# Patient Record
Sex: MALE | Race: BLACK OR AFRICAN AMERICAN | NOT HISPANIC OR LATINO
[De-identification: names, ages, dates, MRNs, and addresses within clinical notes are randomized per-mention and may not be internally consistent; named-entity substitution may affect disease eponyms.]

---

## 2017-04-05 ENCOUNTER — APPOINTMENT (OUTPATIENT)
Dept: OTHER | Facility: CLINIC | Age: 62
End: 2017-04-05

## 2017-04-05 VITALS
HEART RATE: 67 BPM | WEIGHT: 296 LBS | HEIGHT: 78 IN | DIASTOLIC BLOOD PRESSURE: 140 MMHG | BODY MASS INDEX: 34.25 KG/M2 | SYSTOLIC BLOOD PRESSURE: 229 MMHG

## 2017-04-05 VITALS — SYSTOLIC BLOOD PRESSURE: 220 MMHG | HEART RATE: 80 BPM | DIASTOLIC BLOOD PRESSURE: 150 MMHG

## 2017-04-09 ENCOUNTER — RESULT CHARGE (OUTPATIENT)
Age: 62
End: 2017-04-09

## 2017-04-10 ENCOUNTER — LABORATORY RESULT (OUTPATIENT)
Age: 62
End: 2017-04-10

## 2017-04-10 ENCOUNTER — NON-APPOINTMENT (OUTPATIENT)
Age: 62
End: 2017-04-10

## 2017-04-10 ENCOUNTER — APPOINTMENT (OUTPATIENT)
Dept: INTERNAL MEDICINE | Facility: CLINIC | Age: 62
End: 2017-04-10

## 2017-04-10 ENCOUNTER — OUTPATIENT (OUTPATIENT)
Dept: OUTPATIENT SERVICES | Facility: HOSPITAL | Age: 62
LOS: 1 days | End: 2017-04-10
Payer: SELF-PAY

## 2017-04-10 VITALS — HEIGHT: 78 IN | BODY MASS INDEX: 33.55 KG/M2 | WEIGHT: 290 LBS | HEART RATE: 77 BPM

## 2017-04-10 VITALS — SYSTOLIC BLOOD PRESSURE: 216 MMHG | DIASTOLIC BLOOD PRESSURE: 120 MMHG

## 2017-04-10 VITALS — SYSTOLIC BLOOD PRESSURE: 206 MMHG | DIASTOLIC BLOOD PRESSURE: 120 MMHG

## 2017-04-10 DIAGNOSIS — I10 ESSENTIAL (PRIMARY) HYPERTENSION: ICD-10-CM

## 2017-04-10 DIAGNOSIS — T14.8 OTHER INJURY OF UNSPECIFIED BODY REGION: Chronic | ICD-10-CM

## 2017-04-10 LAB
HCT VFR BLD CALC: 54.1 % — HIGH (ref 39–50)
HGB BLD-MCNC: 17.4 G/DL — HIGH (ref 13–17)
MCHC RBC-ENTMCNC: 26 PG — LOW (ref 27–34)
MCHC RBC-ENTMCNC: 32.2 GM/DL — SIGNIFICANT CHANGE UP (ref 32–36)
MCV RBC AUTO: 81 FL — SIGNIFICANT CHANGE UP (ref 80–100)
PLATELET # BLD AUTO: 205 K/UL — SIGNIFICANT CHANGE UP (ref 150–400)
RBC # BLD: 6.68 M/UL — HIGH (ref 4.2–5.8)
RBC # FLD: 16.2 % — HIGH (ref 10.3–14.5)
WBC # BLD: 6.14 K/UL — SIGNIFICANT CHANGE UP (ref 3.8–10.5)
WBC # FLD AUTO: 6.14 K/UL — SIGNIFICANT CHANGE UP (ref 3.8–10.5)

## 2017-04-10 PROCEDURE — 80061 LIPID PANEL: CPT

## 2017-04-10 PROCEDURE — 86780 TREPONEMA PALLIDUM: CPT

## 2017-04-10 PROCEDURE — 80053 COMPREHEN METABOLIC PANEL: CPT

## 2017-04-10 PROCEDURE — 82088 ASSAY OF ALDOSTERONE: CPT

## 2017-04-10 PROCEDURE — 86735 MUMPS ANTIBODY: CPT

## 2017-04-10 PROCEDURE — 86762 RUBELLA ANTIBODY: CPT

## 2017-04-10 PROCEDURE — 84244 ASSAY OF RENIN: CPT

## 2017-04-10 PROCEDURE — 85027 COMPLETE CBC AUTOMATED: CPT

## 2017-04-10 PROCEDURE — 83036 HEMOGLOBIN GLYCOSYLATED A1C: CPT

## 2017-04-10 PROCEDURE — G0463: CPT

## 2017-04-10 PROCEDURE — 84443 ASSAY THYROID STIM HORMONE: CPT

## 2017-04-10 PROCEDURE — 87389 HIV-1 AG W/HIV-1&-2 AB AG IA: CPT

## 2017-04-10 PROCEDURE — 86765 RUBEOLA ANTIBODY: CPT

## 2017-04-11 LAB
ALBUMIN SERPL ELPH-MCNC: 4.5 G/DL — SIGNIFICANT CHANGE UP (ref 3.3–5)
ALDOST SERPL-MCNC: 27.5 NG/DL — HIGH
ALP SERPL-CCNC: 67 U/L — SIGNIFICANT CHANGE UP (ref 40–120)
ALT FLD-CCNC: 41 U/L — SIGNIFICANT CHANGE UP (ref 10–45)
ANION GAP SERPL CALC-SCNC: 19 MMOL/L — HIGH (ref 5–17)
AST SERPL-CCNC: 37 U/L — SIGNIFICANT CHANGE UP (ref 10–40)
BILIRUB SERPL-MCNC: 1 MG/DL — SIGNIFICANT CHANGE UP (ref 0.2–1.2)
BUN SERPL-MCNC: 20 MG/DL — SIGNIFICANT CHANGE UP (ref 7–23)
CALCIUM SERPL-MCNC: 10.3 MG/DL — SIGNIFICANT CHANGE UP (ref 8.4–10.5)
CHLORIDE SERPL-SCNC: 100 MMOL/L — SIGNIFICANT CHANGE UP (ref 96–108)
CHOLEST SERPL-MCNC: 260 MG/DL — HIGH (ref 10–199)
CO2 SERPL-SCNC: 24 MMOL/L — SIGNIFICANT CHANGE UP (ref 22–31)
CREAT SERPL-MCNC: 1.32 MG/DL — HIGH (ref 0.5–1.3)
GLUCOSE SERPL-MCNC: 98 MG/DL — SIGNIFICANT CHANGE UP (ref 70–99)
HBA1C BLD-MCNC: 6.5 % — HIGH (ref 4–5.6)
HDLC SERPL-MCNC: 79 MG/DL — SIGNIFICANT CHANGE UP (ref 40–125)
HIV 1+2 AB+HIV1 P24 AG SERPL QL IA: SIGNIFICANT CHANGE UP
LIPID PNL WITH DIRECT LDL SERPL: 157 MG/DL — HIGH
MEV IGG SER-ACNC: >300 AU/ML — SIGNIFICANT CHANGE UP
MEV IGG+IGM SER-IMP: POSITIVE — SIGNIFICANT CHANGE UP
MUV AB SER-ACNC: NEGATIVE — SIGNIFICANT CHANGE UP
MUV IGG FLD-ACNC: <5 AU/ML — SIGNIFICANT CHANGE UP
POTASSIUM SERPL-MCNC: 4.7 MMOL/L — SIGNIFICANT CHANGE UP (ref 3.5–5.3)
POTASSIUM SERPL-SCNC: 4.7 MMOL/L — SIGNIFICANT CHANGE UP (ref 3.5–5.3)
PROT SERPL-MCNC: 7.4 G/DL — SIGNIFICANT CHANGE UP (ref 6–8.3)
RENIN DIRECT, PLASMA: <2.1 PG/ML — SIGNIFICANT CHANGE UP
RUBV IGG SER-ACNC: 28.3 INDEX — SIGNIFICANT CHANGE UP
RUBV IGG SER-IMP: POSITIVE — SIGNIFICANT CHANGE UP
SODIUM SERPL-SCNC: 143 MMOL/L — SIGNIFICANT CHANGE UP (ref 135–145)
T PALLIDUM AB TITR SER: NEGATIVE — SIGNIFICANT CHANGE UP
TOTAL CHOLESTEROL/HDL RATIO MEASUREMENT: 3.3 RATIO — LOW (ref 3.4–9.6)
TRIGL SERPL-MCNC: 118 MG/DL — SIGNIFICANT CHANGE UP (ref 10–149)
TSH SERPL-MCNC: 0.63 UIU/ML — SIGNIFICANT CHANGE UP (ref 0.27–4.2)

## 2017-04-12 ENCOUNTER — OUTPATIENT (OUTPATIENT)
Dept: OUTPATIENT SERVICES | Facility: HOSPITAL | Age: 62
LOS: 1 days | End: 2017-04-12
Payer: SELF-PAY

## 2017-04-12 ENCOUNTER — APPOINTMENT (OUTPATIENT)
Dept: INTERNAL MEDICINE | Facility: CLINIC | Age: 62
End: 2017-04-12

## 2017-04-12 VITALS — SYSTOLIC BLOOD PRESSURE: 170 MMHG | DIASTOLIC BLOOD PRESSURE: 110 MMHG

## 2017-04-12 VITALS
BODY MASS INDEX: 33.55 KG/M2 | HEIGHT: 78 IN | WEIGHT: 290 LBS | DIASTOLIC BLOOD PRESSURE: 100 MMHG | SYSTOLIC BLOOD PRESSURE: 160 MMHG

## 2017-04-12 DIAGNOSIS — I10 ESSENTIAL (PRIMARY) HYPERTENSION: ICD-10-CM

## 2017-04-12 DIAGNOSIS — T14.8 OTHER INJURY OF UNSPECIFIED BODY REGION: Chronic | ICD-10-CM

## 2017-04-12 PROCEDURE — G0463: CPT

## 2017-04-18 DIAGNOSIS — I51.7 CARDIOMEGALY: ICD-10-CM

## 2017-04-18 DIAGNOSIS — Z00.00 ENCOUNTER FOR GENERAL ADULT MEDICAL EXAMINATION WITHOUT ABNORMAL FINDINGS: ICD-10-CM

## 2017-04-18 DIAGNOSIS — I48.91 UNSPECIFIED ATRIAL FIBRILLATION: ICD-10-CM

## 2017-04-18 DIAGNOSIS — R94.31 ABNORMAL ELECTROCARDIOGRAM [ECG] [EKG]: ICD-10-CM

## 2017-04-19 DIAGNOSIS — E78.00 PURE HYPERCHOLESTEROLEMIA, UNSPECIFIED: ICD-10-CM

## 2017-04-19 DIAGNOSIS — E11.9 TYPE 2 DIABETES MELLITUS WITHOUT COMPLICATIONS: ICD-10-CM

## 2017-04-20 ENCOUNTER — OUTPATIENT (OUTPATIENT)
Dept: OUTPATIENT SERVICES | Facility: HOSPITAL | Age: 62
LOS: 1 days | End: 2017-04-20
Payer: SELF-PAY

## 2017-04-20 ENCOUNTER — APPOINTMENT (OUTPATIENT)
Dept: INTERNAL MEDICINE | Facility: CLINIC | Age: 62
End: 2017-04-20

## 2017-04-20 VITALS
HEIGHT: 78 IN | DIASTOLIC BLOOD PRESSURE: 90 MMHG | SYSTOLIC BLOOD PRESSURE: 120 MMHG | BODY MASS INDEX: 33.55 KG/M2 | WEIGHT: 290 LBS

## 2017-04-20 VITALS — HEART RATE: 70 BPM

## 2017-04-20 DIAGNOSIS — I10 ESSENTIAL (PRIMARY) HYPERTENSION: ICD-10-CM

## 2017-04-20 DIAGNOSIS — T14.8 OTHER INJURY OF UNSPECIFIED BODY REGION: Chronic | ICD-10-CM

## 2017-04-20 PROCEDURE — G0463: CPT

## 2017-04-24 DIAGNOSIS — I51.7 CARDIOMEGALY: ICD-10-CM

## 2017-04-24 DIAGNOSIS — E78.00 PURE HYPERCHOLESTEROLEMIA, UNSPECIFIED: ICD-10-CM

## 2017-04-24 DIAGNOSIS — R94.31 ABNORMAL ELECTROCARDIOGRAM [ECG] [EKG]: ICD-10-CM

## 2017-04-24 DIAGNOSIS — E11.9 TYPE 2 DIABETES MELLITUS WITHOUT COMPLICATIONS: ICD-10-CM

## 2017-04-26 DIAGNOSIS — Z00.00 ENCOUNTER FOR GENERAL ADULT MEDICAL EXAMINATION WITHOUT ABNORMAL FINDINGS: ICD-10-CM

## 2017-04-26 DIAGNOSIS — I48.91 UNSPECIFIED ATRIAL FIBRILLATION: ICD-10-CM

## 2017-04-28 ENCOUNTER — CLINICAL ADVICE (OUTPATIENT)
Age: 62
End: 2017-04-28

## 2017-04-28 ENCOUNTER — LABORATORY RESULT (OUTPATIENT)
Age: 62
End: 2017-04-28

## 2017-04-28 ENCOUNTER — APPOINTMENT (OUTPATIENT)
Dept: INTERNAL MEDICINE | Facility: CLINIC | Age: 62
End: 2017-04-28

## 2017-04-28 ENCOUNTER — OUTPATIENT (OUTPATIENT)
Dept: OUTPATIENT SERVICES | Facility: HOSPITAL | Age: 62
LOS: 1 days | End: 2017-04-28
Payer: SELF-PAY

## 2017-04-28 VITALS
WEIGHT: 294 LBS | DIASTOLIC BLOOD PRESSURE: 80 MMHG | BODY MASS INDEX: 34.02 KG/M2 | HEIGHT: 78 IN | SYSTOLIC BLOOD PRESSURE: 180 MMHG | HEART RATE: 86 BPM

## 2017-04-28 DIAGNOSIS — I10 ESSENTIAL (PRIMARY) HYPERTENSION: ICD-10-CM

## 2017-04-28 DIAGNOSIS — T14.8 OTHER INJURY OF UNSPECIFIED BODY REGION: Chronic | ICD-10-CM

## 2017-04-28 PROCEDURE — 82085 ASSAY OF ALDOLASE: CPT

## 2017-04-28 PROCEDURE — 80053 COMPREHEN METABOLIC PANEL: CPT

## 2017-04-28 PROCEDURE — 84244 ASSAY OF RENIN: CPT

## 2017-04-28 PROCEDURE — 83835 ASSAY OF METANEPHRINES: CPT

## 2017-04-28 PROCEDURE — G0463: CPT

## 2017-04-28 RX ORDER — LABETALOL HYDROCHLORIDE 200 MG/1
200 TABLET, FILM COATED ORAL 3 TIMES DAILY
Qty: 1 | Refills: 1 | Status: DISCONTINUED | COMMUNITY
Start: 2017-04-10 | End: 2017-04-28

## 2017-04-28 RX ORDER — HYDROCHLOROTHIAZIDE 25 MG/1
25 TABLET ORAL
Qty: 90 | Refills: 2 | Status: DISCONTINUED | COMMUNITY
Start: 2017-04-10 | End: 2017-04-28

## 2017-04-28 RX ORDER — ATORVASTATIN CALCIUM 80 MG/1
80 TABLET, FILM COATED ORAL
Qty: 30 | Refills: 0 | Status: DISCONTINUED | COMMUNITY
Start: 2017-04-11 | End: 2017-04-28

## 2017-04-29 LAB
ALBUMIN SERPL ELPH-MCNC: 4.5 G/DL — SIGNIFICANT CHANGE UP (ref 3.3–5)
ALP SERPL-CCNC: 63 U/L — SIGNIFICANT CHANGE UP (ref 40–120)
ALT FLD-CCNC: 38 U/L — SIGNIFICANT CHANGE UP (ref 10–45)
ANION GAP SERPL CALC-SCNC: 16 MMOL/L — SIGNIFICANT CHANGE UP (ref 5–17)
AST SERPL-CCNC: 36 U/L — SIGNIFICANT CHANGE UP (ref 10–40)
BILIRUB SERPL-MCNC: 1.2 MG/DL — SIGNIFICANT CHANGE UP (ref 0.2–1.2)
BUN SERPL-MCNC: 16 MG/DL — SIGNIFICANT CHANGE UP (ref 7–23)
CALCIUM SERPL-MCNC: 9.8 MG/DL — SIGNIFICANT CHANGE UP (ref 8.4–10.5)
CHLORIDE SERPL-SCNC: 99 MMOL/L — SIGNIFICANT CHANGE UP (ref 96–108)
CO2 SERPL-SCNC: 26 MMOL/L — SIGNIFICANT CHANGE UP (ref 22–31)
CREAT SERPL-MCNC: 1.36 MG/DL — HIGH (ref 0.5–1.3)
GLUCOSE SERPL-MCNC: 93 MG/DL — SIGNIFICANT CHANGE UP (ref 70–99)
POTASSIUM SERPL-MCNC: 4.4 MMOL/L — SIGNIFICANT CHANGE UP (ref 3.5–5.3)
POTASSIUM SERPL-SCNC: 4.4 MMOL/L — SIGNIFICANT CHANGE UP (ref 3.5–5.3)
PROT SERPL-MCNC: 7.6 G/DL — SIGNIFICANT CHANGE UP (ref 6–8.3)
SODIUM SERPL-SCNC: 141 MMOL/L — SIGNIFICANT CHANGE UP (ref 135–145)

## 2017-05-02 ENCOUNTER — FORM ENCOUNTER (OUTPATIENT)
Age: 62
End: 2017-05-02

## 2017-05-03 ENCOUNTER — OUTPATIENT (OUTPATIENT)
Dept: OUTPATIENT SERVICES | Facility: HOSPITAL | Age: 62
LOS: 1 days | End: 2017-05-03
Payer: COMMERCIAL

## 2017-05-03 ENCOUNTER — APPOINTMENT (OUTPATIENT)
Dept: ULTRASOUND IMAGING | Facility: CLINIC | Age: 62
End: 2017-05-03

## 2017-05-03 DIAGNOSIS — Z00.8 ENCOUNTER FOR OTHER GENERAL EXAMINATION: ICD-10-CM

## 2017-05-03 DIAGNOSIS — T14.8 OTHER INJURY OF UNSPECIFIED BODY REGION: Chronic | ICD-10-CM

## 2017-05-03 PROCEDURE — 93975 VASCULAR STUDY: CPT

## 2017-05-04 LAB
METANEPHRINE, PL: 40 PG/ML — SIGNIFICANT CHANGE UP (ref 0–62)
NORMETANEPHRINE, PL: 327 PG/ML — HIGH (ref 0–145)

## 2017-05-05 ENCOUNTER — OUTPATIENT (OUTPATIENT)
Dept: OUTPATIENT SERVICES | Facility: HOSPITAL | Age: 62
LOS: 1 days | End: 2017-05-05
Payer: SELF-PAY

## 2017-05-05 ENCOUNTER — APPOINTMENT (OUTPATIENT)
Dept: INTERNAL MEDICINE | Facility: CLINIC | Age: 62
End: 2017-05-05

## 2017-05-05 VITALS — SYSTOLIC BLOOD PRESSURE: 138 MMHG | DIASTOLIC BLOOD PRESSURE: 88 MMHG

## 2017-05-05 VITALS
WEIGHT: 292 LBS | HEART RATE: 75 BPM | HEIGHT: 78 IN | DIASTOLIC BLOOD PRESSURE: 90 MMHG | BODY MASS INDEX: 33.78 KG/M2 | SYSTOLIC BLOOD PRESSURE: 150 MMHG

## 2017-05-05 DIAGNOSIS — I10 ESSENTIAL (PRIMARY) HYPERTENSION: ICD-10-CM

## 2017-05-05 DIAGNOSIS — T14.8 OTHER INJURY OF UNSPECIFIED BODY REGION: Chronic | ICD-10-CM

## 2017-05-05 PROCEDURE — G0463: CPT

## 2017-05-08 ENCOUNTER — APPOINTMENT (OUTPATIENT)
Dept: INTERNAL MEDICINE | Facility: CLINIC | Age: 62
End: 2017-05-08

## 2017-05-09 DIAGNOSIS — Z13.89 ENCOUNTER FOR SCREENING FOR OTHER DISORDER: ICD-10-CM

## 2017-05-10 ENCOUNTER — APPOINTMENT (OUTPATIENT)
Dept: INTERNAL MEDICINE | Facility: CLINIC | Age: 62
End: 2017-05-10

## 2017-05-12 ENCOUNTER — OUTPATIENT (OUTPATIENT)
Dept: OUTPATIENT SERVICES | Facility: HOSPITAL | Age: 62
LOS: 1 days | Discharge: HOME | End: 2017-05-12

## 2017-05-12 VITALS
RESPIRATION RATE: 16 BRPM | WEIGHT: 286.82 LBS | DIASTOLIC BLOOD PRESSURE: 86 MMHG | SYSTOLIC BLOOD PRESSURE: 152 MMHG | HEIGHT: 78 IN | HEART RATE: 56 BPM | TEMPERATURE: 98 F | OXYGEN SATURATION: 98 %

## 2017-05-12 DIAGNOSIS — M23.92 UNSPECIFIED INTERNAL DERANGEMENT OF LEFT KNEE: ICD-10-CM

## 2017-05-12 DIAGNOSIS — Z98.890 OTHER SPECIFIED POSTPROCEDURAL STATES: Chronic | ICD-10-CM

## 2017-05-12 DIAGNOSIS — I10 ESSENTIAL (PRIMARY) HYPERTENSION: ICD-10-CM

## 2017-05-12 DIAGNOSIS — T14.8 OTHER INJURY OF UNSPECIFIED BODY REGION: Chronic | ICD-10-CM

## 2017-05-12 DIAGNOSIS — Z01.818 ENCOUNTER FOR OTHER PREPROCEDURAL EXAMINATION: ICD-10-CM

## 2017-05-12 DIAGNOSIS — I48.91 UNSPECIFIED ATRIAL FIBRILLATION: ICD-10-CM

## 2017-05-12 DIAGNOSIS — M25.562 PAIN IN LEFT KNEE: ICD-10-CM

## 2017-05-12 LAB
ANION GAP SERPL CALC-SCNC: 9 MMOL/L — SIGNIFICANT CHANGE UP (ref 5–17)
APTT BLD: 38.9 SEC — HIGH (ref 27.5–37.4)
BASOPHILS # BLD AUTO: 0.1 K/UL — SIGNIFICANT CHANGE UP (ref 0–0.2)
BASOPHILS NFR BLD AUTO: 2.1 % — HIGH (ref 0–2)
BUN SERPL-MCNC: 26 MG/DL — HIGH (ref 7–23)
CALCIUM SERPL-MCNC: 9.3 MG/DL — SIGNIFICANT CHANGE UP (ref 8.5–10.1)
CHLORIDE SERPL-SCNC: 103 MMOL/L — SIGNIFICANT CHANGE UP (ref 96–108)
CO2 SERPL-SCNC: 26 MMOL/L — SIGNIFICANT CHANGE UP (ref 22–31)
CREAT SERPL-MCNC: 1.48 MG/DL — HIGH (ref 0.5–1.3)
EOSINOPHIL # BLD AUTO: 0 K/UL — SIGNIFICANT CHANGE UP (ref 0–0.5)
EOSINOPHIL NFR BLD AUTO: 0.7 % — SIGNIFICANT CHANGE UP (ref 0–6)
GLUCOSE SERPL-MCNC: 121 MG/DL — HIGH (ref 70–99)
HCT VFR BLD CALC: 53.3 % — HIGH (ref 39–50)
HGB BLD-MCNC: 18.2 G/DL — HIGH (ref 13–17)
INR BLD: 1 RATIO — SIGNIFICANT CHANGE UP (ref 0.88–1.16)
LYMPHOCYTES # BLD AUTO: 1.8 K/UL — SIGNIFICANT CHANGE UP (ref 1–3.3)
LYMPHOCYTES # BLD AUTO: 26.7 % — SIGNIFICANT CHANGE UP (ref 13–44)
MCHC RBC-ENTMCNC: 26.8 PG — LOW (ref 27–34)
MCHC RBC-ENTMCNC: 34.1 GM/DL — SIGNIFICANT CHANGE UP (ref 32–36)
MCV RBC AUTO: 78.6 FL — LOW (ref 80–100)
MONOCYTES # BLD AUTO: 0.5 K/UL — SIGNIFICANT CHANGE UP (ref 0–0.9)
MONOCYTES NFR BLD AUTO: 7.6 % — SIGNIFICANT CHANGE UP (ref 2–14)
NEUTROPHILS # BLD AUTO: 4.3 K/UL — SIGNIFICANT CHANGE UP (ref 1.8–7.4)
NEUTROPHILS NFR BLD AUTO: 62.9 % — SIGNIFICANT CHANGE UP (ref 43–77)
PLATELET # BLD AUTO: 205 K/UL — SIGNIFICANT CHANGE UP (ref 150–400)
POTASSIUM SERPL-MCNC: 3.2 MMOL/L — LOW (ref 3.5–5.3)
POTASSIUM SERPL-SCNC: 3.2 MMOL/L — LOW (ref 3.5–5.3)
PROTHROM AB SERPL-ACNC: 10.9 SEC — SIGNIFICANT CHANGE UP (ref 9.8–12.7)
RBC # BLD: 6.77 M/UL — HIGH (ref 4.2–5.8)
RBC # FLD: 13.4 % — SIGNIFICANT CHANGE UP (ref 11–15)
SODIUM SERPL-SCNC: 138 MMOL/L — SIGNIFICANT CHANGE UP (ref 135–145)
WBC # BLD: 6.8 K/UL — SIGNIFICANT CHANGE UP (ref 3.8–10.5)
WBC # FLD AUTO: 6.8 K/UL — SIGNIFICANT CHANGE UP (ref 3.8–10.5)

## 2017-05-12 NOTE — H&P PST ADULT - NSANTHOSAYNRD_GEN_A_CORE
No. ZEYAD screening performed.  STOP BANG Legend: 0-2 = LOW Risk; 3-4 = INTERMEDIATE Risk; 5-8 = HIGH Risk

## 2017-05-16 DIAGNOSIS — Z86.79 PERSONAL HISTORY OF OTHER DISEASES OF THE CIRCULATORY SYSTEM: ICD-10-CM

## 2017-05-25 ENCOUNTER — RESULT REVIEW (OUTPATIENT)
Age: 62
End: 2017-05-25

## 2017-05-25 ENCOUNTER — OUTPATIENT (OUTPATIENT)
Dept: OUTPATIENT SERVICES | Facility: HOSPITAL | Age: 62
LOS: 1 days | Discharge: HOME | End: 2017-05-25
Payer: OTHER MISCELLANEOUS

## 2017-05-25 VITALS
SYSTOLIC BLOOD PRESSURE: 144 MMHG | RESPIRATION RATE: 18 BRPM | DIASTOLIC BLOOD PRESSURE: 88 MMHG | OXYGEN SATURATION: 100 % | HEART RATE: 79 BPM | TEMPERATURE: 98 F | WEIGHT: 286.6 LBS | HEIGHT: 78 IN

## 2017-05-25 VITALS
RESPIRATION RATE: 17 BRPM | OXYGEN SATURATION: 97 % | HEART RATE: 84 BPM | SYSTOLIC BLOOD PRESSURE: 148 MMHG | DIASTOLIC BLOOD PRESSURE: 88 MMHG

## 2017-05-25 DIAGNOSIS — Z98.890 OTHER SPECIFIED POSTPROCEDURAL STATES: Chronic | ICD-10-CM

## 2017-05-25 DIAGNOSIS — T14.8 OTHER INJURY OF UNSPECIFIED BODY REGION: Chronic | ICD-10-CM

## 2017-05-25 PROCEDURE — 88304 TISSUE EXAM BY PATHOLOGIST: CPT | Mod: 26

## 2017-05-25 RX ORDER — CHLORTHALIDONE 50 MG
1 TABLET ORAL
Qty: 0 | Refills: 0 | COMMUNITY

## 2017-05-25 RX ORDER — SODIUM CHLORIDE 9 MG/ML
3 INJECTION INTRAMUSCULAR; INTRAVENOUS; SUBCUTANEOUS EVERY 8 HOURS
Qty: 0 | Refills: 0 | Status: DISCONTINUED | OUTPATIENT
Start: 2017-05-25 | End: 2017-05-25

## 2017-05-25 RX ORDER — FENTANYL CITRATE 50 UG/ML
25 INJECTION INTRAVENOUS
Qty: 0 | Refills: 0 | Status: DISCONTINUED | OUTPATIENT
Start: 2017-05-25 | End: 2017-05-25

## 2017-05-25 RX ORDER — ACETAMINOPHEN 500 MG
1000 TABLET ORAL ONCE
Qty: 0 | Refills: 0 | Status: COMPLETED | OUTPATIENT
Start: 2017-05-25 | End: 2017-05-25

## 2017-05-25 RX ORDER — ATORVASTATIN CALCIUM 80 MG/1
1 TABLET, FILM COATED ORAL
Qty: 0 | Refills: 0 | COMMUNITY

## 2017-05-25 RX ORDER — SODIUM CHLORIDE 9 MG/ML
1000 INJECTION, SOLUTION INTRAVENOUS
Qty: 0 | Refills: 0 | Status: DISCONTINUED | OUTPATIENT
Start: 2017-05-25 | End: 2017-05-25

## 2017-05-25 RX ADMIN — Medication 400 MILLIGRAM(S): at 10:12

## 2017-05-25 RX ADMIN — Medication 1000 MILLIGRAM(S): at 10:38

## 2017-05-25 RX ADMIN — SODIUM CHLORIDE 100 MILLILITER(S): 9 INJECTION, SOLUTION INTRAVENOUS at 10:58

## 2017-05-25 NOTE — BRIEF OPERATIVE NOTE - PROCEDURE
Synovectomy  05/25/2017    Active  KIMBER  Debridement of meniscus of knee  05/25/2017    Active  KIMBER

## 2017-05-25 NOTE — ASU DISCHARGE PLAN (ADULT/PEDIATRIC). - SPECIAL INSTRUCTIONS
Please keep surgical wound clean and dry. REPORT ANY CALF PAIN TO MD IMMEDIATELY AND GO TO ER. No strenuous activity, heavy lifting, or bending heavy lifting. Weight bearing as tolerated with cane/crutches. Diet as tolerated.  Use pain medications as needed. Take aspirin daily to prevent blood clots to lower extremities. Your doctors office has sent your medication to your home. Please take them as directed. Contact MD and Return to ER if questions concerns or emergencies.

## 2017-05-25 NOTE — ASU DISCHARGE PLAN (ADULT/PEDIATRIC). - NOTIFY
Numbness, color, or temperature change to extremity/Pain not relieved by Medications/Swelling that continues/Unable to Urinate/Bleeding that does not stop/Fever greater than 101/Persistent Nausea and Vomiting

## 2017-05-25 NOTE — BRIEF OPERATIVE NOTE - POST-OP DX
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

## 2017-05-25 NOTE — ASU DISCHARGE PLAN (ADULT/PEDIATRIC). - MEDICATION SUMMARY - MEDICATIONS TO TAKE
I will START or STAY ON the medications listed below when I get home from the hospital:    Percocet 5/325 oral tablet  -- 1 tab(s) by mouth every 6 hours, As Needed  -- Indication: For pain    aspirin 325 mg oral tablet  -- 1 tab(s) by mouth once a day  -- Indication: For DVt prophylaxis    losartan 100 mg oral tablet  -- 1 tab(s) by mouth once a day  -- Indication: For HTN    gabapentin 300 mg oral capsule  -- 1 cap(s) by mouth once a day (at bedtime)  -- Indication: For Neuropathy    NIFEdipine 90 mg oral tablet, extended release  -- 1 tab(s) by mouth once a day  -- Indication: For HTN

## 2017-05-25 NOTE — ASU PATIENT PROFILE, ADULT - PMH
Afib    Hypertension    ZEYAD (obstructive sleep apnea) Afib    Hyperlipidemia    Hypertension    ZEYAD (obstructive sleep apnea)

## 2017-05-26 LAB — SURGICAL PATHOLOGY FINAL REPORT - CH: SIGNIFICANT CHANGE UP

## 2017-05-30 DIAGNOSIS — G47.33 OBSTRUCTIVE SLEEP APNEA (ADULT) (PEDIATRIC): ICD-10-CM

## 2017-05-30 DIAGNOSIS — M23.204 DERANGEMENT OF UNSPECIFIED MEDIAL MENISCUS DUE TO OLD TEAR OR INJURY, LEFT KNEE: ICD-10-CM

## 2017-05-30 DIAGNOSIS — Z79.891 LONG TERM (CURRENT) USE OF OPIATE ANALGESIC: ICD-10-CM

## 2017-05-30 DIAGNOSIS — M67.52 PLICA SYNDROME, LEFT KNEE: ICD-10-CM

## 2017-05-30 DIAGNOSIS — Z79.82 LONG TERM (CURRENT) USE OF ASPIRIN: ICD-10-CM

## 2017-05-30 DIAGNOSIS — I48.91 UNSPECIFIED ATRIAL FIBRILLATION: ICD-10-CM

## 2017-05-30 DIAGNOSIS — M23.201 DERANGEMENT OF UNSPECIFIED LATERAL MENISCUS DUE TO OLD TEAR OR INJURY, LEFT KNEE: ICD-10-CM

## 2017-05-30 DIAGNOSIS — M25.562 PAIN IN LEFT KNEE: ICD-10-CM

## 2017-05-30 DIAGNOSIS — M94.262 CHONDROMALACIA, LEFT KNEE: ICD-10-CM

## 2017-05-30 DIAGNOSIS — M65.9 SYNOVITIS AND TENOSYNOVITIS, UNSPECIFIED: ICD-10-CM

## 2017-05-30 DIAGNOSIS — I10 ESSENTIAL (PRIMARY) HYPERTENSION: ICD-10-CM

## 2017-05-30 DIAGNOSIS — E78.5 HYPERLIPIDEMIA, UNSPECIFIED: ICD-10-CM

## 2017-05-30 DIAGNOSIS — Z87.891 PERSONAL HISTORY OF NICOTINE DEPENDENCE: ICD-10-CM

## 2017-05-30 DIAGNOSIS — M23.42 LOOSE BODY IN KNEE, LEFT KNEE: ICD-10-CM

## 2017-10-26 ENCOUNTER — RESULT CHARGE (OUTPATIENT)
Age: 62
End: 2017-10-26

## 2017-10-27 ENCOUNTER — APPOINTMENT (OUTPATIENT)
Dept: OTHER | Facility: CLINIC | Age: 62
End: 2017-10-27
Payer: COMMERCIAL

## 2017-10-27 ENCOUNTER — APPOINTMENT (OUTPATIENT)
Dept: OTHER | Facility: CLINIC | Age: 62
End: 2017-10-27

## 2017-10-27 VITALS
BODY MASS INDEX: 33.78 KG/M2 | HEART RATE: 81 BPM | WEIGHT: 292 LBS | SYSTOLIC BLOOD PRESSURE: 190 MMHG | HEIGHT: 78 IN | RESPIRATION RATE: 15 BRPM | DIASTOLIC BLOOD PRESSURE: 120 MMHG | OXYGEN SATURATION: 99 %

## 2017-10-27 DIAGNOSIS — E66.9 OBESITY, UNSPECIFIED: ICD-10-CM

## 2017-10-27 DIAGNOSIS — Z99.89 OBSTRUCTIVE SLEEP APNEA (ADULT) (PEDIATRIC): ICD-10-CM

## 2017-10-27 DIAGNOSIS — Z86.39 PERSONAL HISTORY OF OTHER ENDOCRINE, NUTRITIONAL AND METABOLIC DISEASE: ICD-10-CM

## 2017-10-27 DIAGNOSIS — G47.33 OBSTRUCTIVE SLEEP APNEA (ADULT) (PEDIATRIC): ICD-10-CM

## 2017-10-27 DIAGNOSIS — Z86.79 PERSONAL HISTORY OF OTHER DISEASES OF THE CIRCULATORY SYSTEM: ICD-10-CM

## 2017-10-27 PROCEDURE — 94010 BREATHING CAPACITY TEST: CPT

## 2017-10-27 PROCEDURE — 96150: CPT

## 2017-10-27 PROCEDURE — 99396 PREV VISIT EST AGE 40-64: CPT

## 2017-10-27 RX ORDER — AZITHROMYCIN 250 MG/1
250 TABLET, FILM COATED ORAL
Qty: 6 | Refills: 0 | Status: DISCONTINUED | COMMUNITY
Start: 2017-10-16

## 2017-10-27 RX ORDER — NIFEDIPINE 90 MG/1
90 TABLET, EXTENDED RELEASE ORAL DAILY
Qty: 30 | Refills: 2 | Status: COMPLETED | COMMUNITY
Start: 2017-04-10 | End: 2017-10-27

## 2017-10-27 RX ORDER — GABAPENTIN 300 MG/1
300 CAPSULE ORAL AT BEDTIME
Qty: 30 | Refills: 3 | Status: COMPLETED | COMMUNITY
Start: 2017-04-28 | End: 2017-10-27

## 2017-10-30 LAB
ALBUMIN SERPL ELPH-MCNC: 4.2 G/DL
ALP BLD-CCNC: 65 U/L
ALT SERPL-CCNC: 26 U/L
ANION GAP SERPL CALC-SCNC: 15 MMOL/L
APPEARANCE: CLEAR
AST SERPL-CCNC: 22 U/L
BASOPHILS # BLD AUTO: 0.06 K/UL
BASOPHILS NFR BLD AUTO: 1 %
BILIRUB SERPL-MCNC: 0.8 MG/DL
BILIRUBIN URINE: NEGATIVE
BLOOD URINE: NEGATIVE
BUN SERPL-MCNC: 16 MG/DL
CALCIUM SERPL-MCNC: 9.8 MG/DL
CHLORIDE SERPL-SCNC: 100 MMOL/L
CHOLEST SERPL-MCNC: 206 MG/DL
CHOLEST/HDLC SERPL: 2.6 RATIO
CO2 SERPL-SCNC: 28 MMOL/L
COLOR: YELLOW
CREAT SERPL-MCNC: 1.22 MG/DL
EOSINOPHIL # BLD AUTO: 0.02 K/UL
EOSINOPHIL NFR BLD AUTO: 0.3 %
GLUCOSE QUALITATIVE U: NEGATIVE MG/DL
GLUCOSE SERPL-MCNC: 110 MG/DL
HCT VFR BLD CALC: 48.9 %
HDLC SERPL-MCNC: 78 MG/DL
HGB BLD-MCNC: 16.4 G/DL
IMM GRANULOCYTES NFR BLD AUTO: 0.2 %
KETONES URINE: NEGATIVE
LDLC SERPL CALC-MCNC: 107 MG/DL
LEUKOCYTE ESTERASE URINE: NEGATIVE
LYMPHOCYTES # BLD AUTO: 1.59 K/UL
LYMPHOCYTES NFR BLD AUTO: 26 %
MAN DIFF?: NORMAL
MCHC RBC-ENTMCNC: 27.4 PG
MCHC RBC-ENTMCNC: 33.5 GM/DL
MCV RBC AUTO: 81.6 FL
MONOCYTES # BLD AUTO: 0.63 K/UL
MONOCYTES NFR BLD AUTO: 10.3 %
NEUTROPHILS # BLD AUTO: 3.8 K/UL
NEUTROPHILS NFR BLD AUTO: 62.2 %
NITRITE URINE: NEGATIVE
PH URINE: 7
PLATELET # BLD AUTO: 223 K/UL
POTASSIUM SERPL-SCNC: 3.9 MMOL/L
PROT SERPL-MCNC: 7.2 G/DL
PROTEIN URINE: 100 MG/DL
RBC # BLD: 5.99 M/UL
RBC # FLD: 14.8 %
SODIUM SERPL-SCNC: 143 MMOL/L
SPECIFIC GRAVITY URINE: 1.01
TRIGL SERPL-MCNC: 104 MG/DL
UROBILINOGEN URINE: NEGATIVE MG/DL
WBC # FLD AUTO: 6.11 K/UL

## 2018-02-06 ENCOUNTER — NON-APPOINTMENT (OUTPATIENT)
Age: 63
End: 2018-02-06

## 2018-02-06 ENCOUNTER — APPOINTMENT (OUTPATIENT)
Dept: INTERNAL MEDICINE | Facility: CLINIC | Age: 63
End: 2018-02-06
Payer: COMMERCIAL

## 2018-02-06 VITALS
OXYGEN SATURATION: 95 % | SYSTOLIC BLOOD PRESSURE: 234 MMHG | BODY MASS INDEX: 34.13 KG/M2 | HEIGHT: 78 IN | WEIGHT: 295 LBS | DIASTOLIC BLOOD PRESSURE: 140 MMHG | HEART RATE: 83 BPM

## 2018-02-06 DIAGNOSIS — N50.9 DISORDER OF MALE GENITAL ORGANS, UNSPECIFIED: ICD-10-CM

## 2018-02-06 PROCEDURE — 99213 OFFICE O/P EST LOW 20 MIN: CPT

## 2018-02-06 RX ORDER — AMLODIPINE BESYLATE 10 MG/1
10 TABLET ORAL
Refills: 0 | Status: DISCONTINUED | COMMUNITY
End: 2018-02-06

## 2018-02-06 RX ORDER — CLONIDINE HYDROCHLORIDE 0.3 MG/1
TABLET ORAL
Refills: 0 | Status: DISCONTINUED | COMMUNITY
End: 2018-02-06

## 2018-02-06 RX ORDER — NEBIVOLOL HYDROCHLORIDE 20 MG/1
TABLET ORAL
Refills: 0 | Status: DISCONTINUED | COMMUNITY
End: 2018-02-06

## 2018-02-09 ENCOUNTER — APPOINTMENT (OUTPATIENT)
Dept: INTERNAL MEDICINE | Facility: CLINIC | Age: 63
End: 2018-02-09
Payer: COMMERCIAL

## 2018-02-09 VITALS — SYSTOLIC BLOOD PRESSURE: 140 MMHG | DIASTOLIC BLOOD PRESSURE: 90 MMHG

## 2018-02-09 LAB
25(OH)D3 SERPL-MCNC: 20.8 NG/ML
ALBUMIN SERPL ELPH-MCNC: 4 G/DL
ALP BLD-CCNC: 55 U/L
ALT SERPL-CCNC: 29 U/L
ANION GAP SERPL CALC-SCNC: 15 MMOL/L
APPEARANCE: CLEAR
AST SERPL-CCNC: 29 U/L
BACTERIA: NEGATIVE
BASOPHILS # BLD AUTO: 0.06 K/UL
BASOPHILS NFR BLD AUTO: 0.8 %
BILIRUB SERPL-MCNC: 0.9 MG/DL
BILIRUBIN URINE: NEGATIVE
BLOOD URINE: NEGATIVE
BUN SERPL-MCNC: 13 MG/DL
CALCIUM SERPL-MCNC: 9.7 MG/DL
CHLORIDE SERPL-SCNC: 103 MMOL/L
CHOLEST SERPL-MCNC: 224 MG/DL
CHOLEST/HDLC SERPL: 2.7 RATIO
CO2 SERPL-SCNC: 27 MMOL/L
COLOR: YELLOW
CREAT SERPL-MCNC: 1.34 MG/DL
EOSINOPHIL # BLD AUTO: 0.06 K/UL
EOSINOPHIL NFR BLD AUTO: 0.8 %
GLUCOSE QUALITATIVE U: NEGATIVE MG/DL
GLUCOSE SERPL-MCNC: 73 MG/DL
HBA1C MFR BLD HPLC: 6.3 %
HCT VFR BLD CALC: 51.5 %
HDLC SERPL-MCNC: 84 MG/DL
HGB BLD-MCNC: 16.9 G/DL
HYALINE CASTS: 0 /LPF
IMM GRANULOCYTES NFR BLD AUTO: 0.3 %
KETONES URINE: NEGATIVE
LDLC SERPL CALC-MCNC: 119 MG/DL
LEUKOCYTE ESTERASE URINE: NEGATIVE
LYMPHOCYTES # BLD AUTO: 2 K/UL
LYMPHOCYTES NFR BLD AUTO: 26.4 %
MAN DIFF?: NORMAL
MCHC RBC-ENTMCNC: 26.4 PG
MCHC RBC-ENTMCNC: 32.8 GM/DL
MCV RBC AUTO: 80.6 FL
MICROSCOPIC-UA: NORMAL
MONOCYTES # BLD AUTO: 0.8 K/UL
MONOCYTES NFR BLD AUTO: 10.6 %
NEUTROPHILS # BLD AUTO: 4.63 K/UL
NEUTROPHILS NFR BLD AUTO: 61.1 %
NITRITE URINE: NEGATIVE
PH URINE: 6
PLATELET # BLD AUTO: 199 K/UL
POTASSIUM SERPL-SCNC: 4 MMOL/L
PROT SERPL-MCNC: 7.5 G/DL
PROTEIN URINE: 300 MG/DL
RBC # BLD: 6.39 M/UL
RBC # FLD: 16.7 %
RED BLOOD CELLS URINE: 3 /HPF
SODIUM SERPL-SCNC: 145 MMOL/L
SPECIFIC GRAVITY URINE: 1.02
SQUAMOUS EPITHELIAL CELLS: 1 /HPF
T4 FREE SERPL-MCNC: 1.2 NG/DL
TRIGL SERPL-MCNC: 106 MG/DL
TSH SERPL-ACNC: 0.82 UIU/ML
UROBILINOGEN URINE: 1 MG/DL
WBC # FLD AUTO: 7.57 K/UL
WHITE BLOOD CELLS URINE: 2 /HPF

## 2018-02-09 PROCEDURE — 99212 OFFICE O/P EST SF 10 MIN: CPT

## 2018-02-15 ENCOUNTER — FORM ENCOUNTER (OUTPATIENT)
Age: 63
End: 2018-02-15

## 2018-02-16 ENCOUNTER — OUTPATIENT (OUTPATIENT)
Dept: OUTPATIENT SERVICES | Facility: HOSPITAL | Age: 63
LOS: 1 days | End: 2018-02-16
Payer: COMMERCIAL

## 2018-02-16 ENCOUNTER — APPOINTMENT (OUTPATIENT)
Dept: ULTRASOUND IMAGING | Facility: CLINIC | Age: 63
End: 2018-02-16
Payer: COMMERCIAL

## 2018-02-16 DIAGNOSIS — Z00.8 ENCOUNTER FOR OTHER GENERAL EXAMINATION: ICD-10-CM

## 2018-02-16 DIAGNOSIS — Z98.890 OTHER SPECIFIED POSTPROCEDURAL STATES: Chronic | ICD-10-CM

## 2018-02-16 DIAGNOSIS — T14.8 OTHER INJURY OF UNSPECIFIED BODY REGION: Chronic | ICD-10-CM

## 2018-02-16 PROCEDURE — 76870 US EXAM SCROTUM: CPT

## 2018-02-16 PROCEDURE — 76870 US EXAM SCROTUM: CPT | Mod: 26

## 2018-03-09 ENCOUNTER — APPOINTMENT (OUTPATIENT)
Dept: INTERNAL MEDICINE | Facility: CLINIC | Age: 63
End: 2018-03-09
Payer: COMMERCIAL

## 2018-03-09 VITALS
WEIGHT: 294 LBS | DIASTOLIC BLOOD PRESSURE: 104 MMHG | HEIGHT: 78 IN | BODY MASS INDEX: 34.02 KG/M2 | SYSTOLIC BLOOD PRESSURE: 182 MMHG

## 2018-03-09 DIAGNOSIS — M54.14 RADICULOPATHY, THORACIC REGION: ICD-10-CM

## 2018-03-09 PROCEDURE — 99214 OFFICE O/P EST MOD 30 MIN: CPT | Mod: 25

## 2018-03-09 RX ORDER — IBUPROFEN 600 MG/1
600 TABLET, FILM COATED ORAL 3 TIMES DAILY
Qty: 30 | Refills: 0 | Status: COMPLETED | COMMUNITY
Start: 2018-03-09 | End: 2018-03-19

## 2018-03-09 RX ORDER — KETOROLAC TROMETHAMINE 30 MG/ML
60 INJECTION INTRAMUSCULAR
Qty: 1 | Refills: 0 | Status: COMPLETED | OUTPATIENT
Start: 2018-03-09

## 2018-03-09 RX ADMIN — KETOROLAC TROMETHAMINE 0 MG/2ML: 30 INJECTION INTRAMUSCULAR at 00:00

## 2018-04-20 ENCOUNTER — APPOINTMENT (OUTPATIENT)
Dept: INTERNAL MEDICINE | Facility: CLINIC | Age: 63
End: 2018-04-20
Payer: COMMERCIAL

## 2018-05-04 ENCOUNTER — RESULT CHARGE (OUTPATIENT)
Age: 63
End: 2018-05-04

## 2018-05-04 ENCOUNTER — APPOINTMENT (OUTPATIENT)
Dept: INTERNAL MEDICINE | Facility: CLINIC | Age: 63
End: 2018-05-04
Payer: COMMERCIAL

## 2018-05-04 VITALS
HEIGHT: 78 IN | BODY MASS INDEX: 27.19 KG/M2 | WEIGHT: 235 LBS | SYSTOLIC BLOOD PRESSURE: 192 MMHG | DIASTOLIC BLOOD PRESSURE: 114 MMHG | HEART RATE: 72 BPM | OXYGEN SATURATION: 97 %

## 2018-05-04 LAB
GLUCOSE BLDC GLUCOMTR-MCNC: 86
HBA1C MFR BLD HPLC: 6

## 2018-05-04 PROCEDURE — 82962 GLUCOSE BLOOD TEST: CPT

## 2018-05-04 PROCEDURE — 99214 OFFICE O/P EST MOD 30 MIN: CPT | Mod: 25

## 2018-05-04 PROCEDURE — 83036 HEMOGLOBIN GLYCOSYLATED A1C: CPT | Mod: QW

## 2018-05-04 RX ORDER — PROMETHAZINE HYDROCHLORIDE AND CODEINE PHOSPHATE 6.25; 1 MG/5ML; MG/5ML
6.25-1 SOLUTION ORAL
Qty: 180 | Refills: 0 | Status: DISCONTINUED | COMMUNITY
Start: 2018-01-11

## 2018-05-04 RX ORDER — CIPROFLOXACIN AND DEXAMETHASONE 3; 1 MG/ML; MG/ML
0.3-0.1 SUSPENSION/ DROPS AURICULAR (OTIC)
Qty: 8 | Refills: 0 | Status: DISCONTINUED | COMMUNITY
Start: 2018-01-05

## 2018-08-23 ENCOUNTER — APPOINTMENT (OUTPATIENT)
Dept: UROLOGY | Facility: CLINIC | Age: 63
End: 2018-08-23
Payer: COMMERCIAL

## 2018-08-23 VITALS
TEMPERATURE: 98.1 F | HEART RATE: 76 BPM | DIASTOLIC BLOOD PRESSURE: 138 MMHG | SYSTOLIC BLOOD PRESSURE: 222 MMHG | BODY MASS INDEX: 27.19 KG/M2 | RESPIRATION RATE: 15 BRPM | HEIGHT: 78 IN | WEIGHT: 235 LBS
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
## 2018-08-23 PROCEDURE — 99203 OFFICE O/P NEW LOW 30 MIN: CPT

## 2018-08-28 ENCOUNTER — APPOINTMENT (OUTPATIENT)
Dept: INTERNAL MEDICINE | Facility: CLINIC | Age: 63
End: 2018-08-28
Payer: COMMERCIAL

## 2018-08-28 VITALS
DIASTOLIC BLOOD PRESSURE: 98 MMHG | WEIGHT: 286 LBS | OXYGEN SATURATION: 97 % | HEIGHT: 78 IN | BODY MASS INDEX: 33.09 KG/M2 | HEART RATE: 83 BPM | SYSTOLIC BLOOD PRESSURE: 162 MMHG

## 2018-08-28 PROBLEM — E78.5 HYPERLIPIDEMIA, UNSPECIFIED: Chronic | Status: ACTIVE | Noted: 2017-05-25

## 2018-08-28 LAB
GLUCOSE BLDC GLUCOMTR-MCNC: 135
HBA1C MFR BLD HPLC: 6

## 2018-08-28 PROCEDURE — 99214 OFFICE O/P EST MOD 30 MIN: CPT | Mod: 25

## 2018-08-28 PROCEDURE — G0396 ALCOHOL/SUBS INTERV 15-30MN: CPT

## 2018-08-28 PROCEDURE — 83036 HEMOGLOBIN GLYCOSYLATED A1C: CPT | Mod: QW

## 2018-08-28 PROCEDURE — 82962 GLUCOSE BLOOD TEST: CPT

## 2018-09-18 ENCOUNTER — FORM ENCOUNTER (OUTPATIENT)
Age: 63
End: 2018-09-18

## 2018-11-26 ENCOUNTER — APPOINTMENT (OUTPATIENT)
Dept: INTERNAL MEDICINE | Facility: CLINIC | Age: 63
End: 2018-11-26
Payer: COMMERCIAL

## 2018-11-26 VITALS
OXYGEN SATURATION: 96 % | WEIGHT: 290 LBS | HEART RATE: 103 BPM | DIASTOLIC BLOOD PRESSURE: 102 MMHG | BODY MASS INDEX: 33.55 KG/M2 | SYSTOLIC BLOOD PRESSURE: 178 MMHG | HEIGHT: 78 IN

## 2018-11-26 LAB
GLUCOSE BLDC GLUCOMTR-MCNC: 119
HBA1C MFR BLD HPLC: 5.9

## 2018-11-26 PROCEDURE — 99214 OFFICE O/P EST MOD 30 MIN: CPT | Mod: 25

## 2018-11-26 PROCEDURE — 82962 GLUCOSE BLOOD TEST: CPT

## 2018-11-26 PROCEDURE — 83036 HEMOGLOBIN GLYCOSYLATED A1C: CPT | Mod: QW

## 2018-11-28 ENCOUNTER — APPOINTMENT (OUTPATIENT)
Dept: OTHER | Facility: CLINIC | Age: 63
End: 2018-11-28
Payer: COMMERCIAL

## 2018-11-28 VITALS
SYSTOLIC BLOOD PRESSURE: 160 MMHG | HEIGHT: 78 IN | DIASTOLIC BLOOD PRESSURE: 110 MMHG | RESPIRATION RATE: 16 BRPM | BODY MASS INDEX: 32.86 KG/M2 | OXYGEN SATURATION: 98 % | HEART RATE: 73 BPM | WEIGHT: 284 LBS

## 2018-11-28 PROCEDURE — 94010 BREATHING CAPACITY TEST: CPT

## 2018-11-28 PROCEDURE — 96150: CPT

## 2018-11-28 PROCEDURE — 99396 PREV VISIT EST AGE 40-64: CPT | Mod: 25

## 2018-11-28 RX ORDER — NIFEDIPINE 90 MG/1
90 TABLET, EXTENDED RELEASE ORAL DAILY
Qty: 90 | Refills: 3 | Status: DISCONTINUED | COMMUNITY
Start: 2017-10-16 | End: 2018-11-28

## 2018-11-28 RX ORDER — CAYENNE 450 MG
CAPSULE ORAL
Refills: 0 | Status: DISCONTINUED | COMMUNITY
End: 2018-11-28

## 2018-11-28 RX ORDER — PNV NO.95/FERROUS FUM/FOLIC AC 28MG-0.8MG
TABLET ORAL
Refills: 0 | Status: DISCONTINUED | COMMUNITY
End: 2018-11-28

## 2018-11-28 RX ORDER — UBIDECARENONE/VIT E ACET 100MG-5
CAPSULE ORAL
Refills: 0 | Status: DISCONTINUED | COMMUNITY
End: 2018-11-28

## 2018-11-30 LAB
ALBUMIN SERPL ELPH-MCNC: 4.5 G/DL
ALP BLD-CCNC: 67 U/L
ALT SERPL-CCNC: 23 U/L
ANION GAP SERPL CALC-SCNC: 14 MMOL/L
AST SERPL-CCNC: 24 U/L
BASOPHILS # BLD AUTO: 0.05 K/UL
BASOPHILS NFR BLD AUTO: 0.6 %
BILIRUB SERPL-MCNC: 1.1 MG/DL
BUN SERPL-MCNC: 13 MG/DL
CALCIUM SERPL-MCNC: 9.6 MG/DL
CHLORIDE SERPL-SCNC: 102 MMOL/L
CHOLEST SERPL-MCNC: 225 MG/DL
CHOLEST/HDLC SERPL: 2.6 RATIO
CO2 SERPL-SCNC: 24 MMOL/L
CREAT SERPL-MCNC: 1.15 MG/DL
EOSINOPHIL # BLD AUTO: 0.06 K/UL
EOSINOPHIL NFR BLD AUTO: 0.7 %
GLUCOSE SERPL-MCNC: 114 MG/DL
HCT VFR BLD CALC: 54.7 %
HDLC SERPL-MCNC: 85 MG/DL
HGB BLD-MCNC: 17.8 G/DL
IMM GRANULOCYTES NFR BLD AUTO: 0.2 %
LDLC SERPL CALC-MCNC: 118 MG/DL
LYMPHOCYTES # BLD AUTO: 1.88 K/UL
LYMPHOCYTES NFR BLD AUTO: 23.4 %
MAN DIFF?: NORMAL
MCHC RBC-ENTMCNC: 26.8 PG
MCHC RBC-ENTMCNC: 32.5 GM/DL
MCV RBC AUTO: 82.3 FL
MONOCYTES # BLD AUTO: 0.81 K/UL
MONOCYTES NFR BLD AUTO: 10.1 %
NEUTROPHILS # BLD AUTO: 5.21 K/UL
NEUTROPHILS NFR BLD AUTO: 65 %
PLATELET # BLD AUTO: 218 K/UL
POTASSIUM SERPL-SCNC: 4.1 MMOL/L
PROT SERPL-MCNC: 7.3 G/DL
RBC # BLD: 6.65 M/UL
RBC # FLD: 15.6 %
SODIUM SERPL-SCNC: 140 MMOL/L
TRIGL SERPL-MCNC: 108 MG/DL
WBC # FLD AUTO: 8.03 K/UL

## 2018-12-02 NOTE — HISTORY OF PRESENT ILLNESS
[de-identified] : Pt presents in office for routine follow up for hypertension and diabetes management. Pt offers no complaints. He verbalize that he monitors his blood pressure at home daily. He states that his reading are usually in the 130s/80s range. He states that last night he took his BP and states that it was "high like in the 180s" and took more blood pressure medication. He states that her tries to maintain a diet low in salt but reports waking up at 2 am and had a pastrami sandwich with ice cream. He also states that in an effort to control his hypertension he only drinks alcohol on the Friday, Saturday, and Sunday.  He denies fever, chills, nausea, vomiting, palpitation, chest pain, and syncope.

## 2018-12-02 NOTE — ASSESSMENT
[FreeTextEntry1] : 62 year old  male with with history or resistant hypertension, atrial fibrillation, and diabetes. \par 1. BP retaken in 180/100 Hr 104. Metoprolol ordered in August and was never taken as the patient never picked up the medication. Metoprolol  ER 50mg daily for heart rate control reordered.\par 2. Discussed starting Elquis for Afib. After review of blood work. Educated the patient of the risk associated with drinking alcohol while on  anticoagulation medication.  Chart was reviewed.  Patient has a history of chronic A. fib on aspirin alone.  In the first medicine note there is a comment that he is status post an ablation for A. fib.  He is currently in A. fib.  His chads 2 vascular score is higher because of his history of hypertension and diabetes and he really should be on something more than aspirin.  Will check labs and refer to cardiology.\par 3 Referral for Echocardiogram \par 4. F/u in one month or sooner if needed.

## 2018-12-02 NOTE — PHYSICAL EXAM
[No Acute Distress] : no acute distress [Well Nourished] : well nourished [Well Developed] : well developed [Well-Appearing] : well-appearing [No JVD] : no jugular venous distention [No Respiratory Distress] : no respiratory distress  [Clear to Auscultation] : lungs were clear to auscultation bilaterally [No Accessory Muscle Use] : no accessory muscle use [Pedal Pulses Present] : the pedal pulses are present [Speech Grossly Normal] : speech grossly normal [Memory Grossly Normal] : memory grossly normal [Normal Affect] : the affect was normal [Alert and Oriented x3] : oriented to person, place, and time [Supple] : supple [Soft] : abdomen soft [Non Tender] : non-tender [No HSM] : no HSM [Normal Bowel Sounds] : normal bowel sounds [de-identified] : irregular rate and rhythm  [de-identified] : Trace edema to b/l ankles

## 2018-12-04 ENCOUNTER — RESULT REVIEW (OUTPATIENT)
Age: 63
End: 2018-12-04

## 2018-12-18 DIAGNOSIS — D58.2 OTHER HEMOGLOBINOPATHIES: ICD-10-CM

## 2018-12-18 LAB
ALBUMIN SERPL ELPH-MCNC: 4.5 G/DL
ALP BLD-CCNC: 68 U/L
ALT SERPL-CCNC: 24 U/L
ANION GAP SERPL CALC-SCNC: 17 MMOL/L
APPEARANCE: CLEAR
APTT BLD: 30.5 SEC
AST SERPL-CCNC: 28 U/L
BACTERIA: NEGATIVE
BASOPHILS # BLD AUTO: 0.06 K/UL
BASOPHILS NFR BLD AUTO: 0.9 %
BILIRUB SERPL-MCNC: 1.4 MG/DL
BILIRUBIN URINE: NEGATIVE
BLOOD URINE: NEGATIVE
BUN SERPL-MCNC: 16 MG/DL
CALCIUM SERPL-MCNC: 9.9 MG/DL
CHLORIDE SERPL-SCNC: 100 MMOL/L
CHOLEST SERPL-MCNC: 222 MG/DL
CHOLEST/HDLC SERPL: 2.5 RATIO
CO2 SERPL-SCNC: 24 MMOL/L
COLOR: YELLOW
CREAT SERPL-MCNC: 1.2 MG/DL
CREAT SPEC-SCNC: 122 MG/DL
EOSINOPHIL # BLD AUTO: 0.06 K/UL
EOSINOPHIL NFR BLD AUTO: 0.9 %
GLUCOSE QUALITATIVE U: NEGATIVE MG/DL
GLUCOSE SERPL-MCNC: 116 MG/DL
HCT VFR BLD CALC: 54.6 %
HDLC SERPL-MCNC: 88 MG/DL
HGB BLD-MCNC: 18 G/DL
HYALINE CASTS: 1 /LPF
IMM GRANULOCYTES NFR BLD AUTO: 0.1 %
INR PPP: 0.98 RATIO
KETONES URINE: NEGATIVE
LDLC SERPL CALC-MCNC: 120 MG/DL
LEUKOCYTE ESTERASE URINE: NEGATIVE
LYMPHOCYTES # BLD AUTO: 2.03 K/UL
LYMPHOCYTES NFR BLD AUTO: 29.7 %
MAN DIFF?: NORMAL
MCHC RBC-ENTMCNC: 26.9 PG
MCHC RBC-ENTMCNC: 33 GM/DL
MCV RBC AUTO: 81.7 FL
MICROALBUMIN 24H UR DL<=1MG/L-MCNC: 49.5 MG/DL
MICROALBUMIN/CREAT 24H UR-RTO: 406 MG/G
MICROSCOPIC-UA: NORMAL
MONOCYTES # BLD AUTO: 0.8 K/UL
MONOCYTES NFR BLD AUTO: 11.7 %
NEUTROPHILS # BLD AUTO: 3.87 K/UL
NEUTROPHILS NFR BLD AUTO: 56.7 %
NITRITE URINE: NEGATIVE
PH URINE: 7.5
PLATELET # BLD AUTO: 195 K/UL
POTASSIUM SERPL-SCNC: 3.9 MMOL/L
PROT SERPL-MCNC: 7.6 G/DL
PROTEIN URINE: 100 MG/DL
PT BLD: 11 SEC
RBC # BLD: 6.68 M/UL
RBC # FLD: 15.5 %
RED BLOOD CELLS URINE: 1 /HPF
SODIUM SERPL-SCNC: 141 MMOL/L
SPECIFIC GRAVITY URINE: 1.02
SQUAMOUS EPITHELIAL CELLS: 1 /HPF
T4 FREE SERPL-MCNC: 1.5 NG/DL
TRIGL SERPL-MCNC: 71 MG/DL
TSH SERPL-ACNC: 0.55 UIU/ML
UROBILINOGEN URINE: 1 MG/DL
WBC # FLD AUTO: 6.83 K/UL
WHITE BLOOD CELLS URINE: 1 /HPF

## 2019-01-04 ENCOUNTER — APPOINTMENT (OUTPATIENT)
Dept: INTERNAL MEDICINE | Facility: CLINIC | Age: 64
End: 2019-01-04

## 2019-02-03 ENCOUNTER — FORM ENCOUNTER (OUTPATIENT)
Age: 64
End: 2019-02-03

## 2019-02-06 ENCOUNTER — APPOINTMENT (OUTPATIENT)
Dept: INTERNAL MEDICINE | Facility: CLINIC | Age: 64
End: 2019-02-06

## 2019-02-14 ENCOUNTER — APPOINTMENT (OUTPATIENT)
Dept: GASTROENTEROLOGY | Facility: CLINIC | Age: 64
End: 2019-02-14
Payer: COMMERCIAL

## 2019-02-14 VITALS
HEART RATE: 93 BPM | TEMPERATURE: 97.6 F | BODY MASS INDEX: 34.01 KG/M2 | OXYGEN SATURATION: 97 % | HEIGHT: 77.5 IN | WEIGHT: 291 LBS

## 2019-02-14 VITALS — DIASTOLIC BLOOD PRESSURE: 150 MMHG | SYSTOLIC BLOOD PRESSURE: 236 MMHG

## 2019-02-14 PROCEDURE — 99204 OFFICE O/P NEW MOD 45 MIN: CPT

## 2019-02-14 NOTE — HISTORY OF PRESENT ILLNESS
[de-identified] : Referring Physician: SELF,REFERRED \par 63 year  old   male  with  a past medical history as below presents for evaluation for GERD. He notices that over the past two years he has been feeling occasional substernal burning with sour taste in mouth. He currently takes no medications for it.  Eats a diet in spicy foods and tomato based. \par ROS: He  denies headache, rash, eye pain, joint pain, weight loss, fever, chills, night sweats, chest pain, dyspnea, cough, diarrhea, constipation, melena, abdominal pain, nausea and vomiting. \par MHx: HTN , HLD \par SHx:  Shoulder repair knee repair\par Significant Meds: Denies ASA or AC. \par Allergies: NKDA \par Family history: Denies a family history of colon CA, gastric CA, high risk polyps, Inflammatory and autoimmune disease. \par Social: Denies , ETOH, IVDA. No Tattoos Former TOB \par Health maintenance: -ve history of blood transfusions, \par Last colonoscopy:            3 years ago      Last EGD :     \par \par

## 2019-02-14 NOTE — PHYSICAL EXAM
[General Appearance - Alert] : alert [General Appearance - In No Acute Distress] : in no acute distress [Sclera] : the sclera and conjunctiva were normal [PERRL With Normal Accommodation] : pupils were equal in size, round, and reactive to light [Extraocular Movements] : extraocular movements were intact [Outer Ear] : the ears and nose were normal in appearance [Oropharynx] : the oropharynx was normal [Neck Appearance] : the appearance of the neck was normal [Neck Cervical Mass (___cm)] : no neck mass was observed [Jugular Venous Distention Increased] : there was no jugular-venous distention [Thyroid Diffuse Enlargement] : the thyroid was not enlarged [Thyroid Nodule] : there were no palpable thyroid nodules [Auscultation Breath Sounds / Voice Sounds] : lungs were clear to auscultation bilaterally [Heart Rate And Rhythm] : heart rate was normal and rhythm regular [Heart Sounds] : normal S1 and S2 [Heart Sounds Gallop] : no gallops [Murmurs] : no murmurs [Heart Sounds Pericardial Friction Rub] : no pericardial rub [Bowel Sounds] : normal bowel sounds [Abdomen Soft] : soft [Abdomen Tenderness] : non-tender [Abdomen Mass (___ Cm)] : no abdominal mass palpated [Cervical Lymph Nodes Enlarged Posterior Bilaterally] : posterior cervical [Cervical Lymph Nodes Enlarged Anterior Bilaterally] : anterior cervical [Supraclavicular Lymph Nodes Enlarged Bilaterally] : supraclavicular [No Spinal Tenderness] : no spinal tenderness [] : no rash [Oriented To Time, Place, And Person] : oriented to person, place, and time [Impaired Insight] : insight and judgment were intact [Affect] : the affect was normal

## 2019-02-14 NOTE — ASSESSMENT
[FreeTextEntry1] : 63 year old male with chronic GERD. former Tob use.  Pending cardiac workup in two weeks \par \par Plan:\par EGD to screen for Carcamo's esophagus \par PPI as needed for GERD\par Reviewed lifestyle and diet modifications \par EGD\par All questions and concerns have been addressed. \par The risks, benefits, and alternatives were explained in detail to the patient. Risks including but not limited to bleeding, perforation, adverse reaction to medications, cardiopulmonary compromise and their attendant sequalae explained.  Sequelae including but not limited to need for surgery, colostomy, prolonged hospital stay, placement of drainage tubes, blood transfusions, disability, morbidity and mortality was explained. \par \par

## 2019-03-05 ENCOUNTER — APPOINTMENT (OUTPATIENT)
Dept: CARDIOLOGY | Facility: CLINIC | Age: 64
End: 2019-03-05
Payer: COMMERCIAL

## 2019-03-05 ENCOUNTER — NON-APPOINTMENT (OUTPATIENT)
Age: 64
End: 2019-03-05

## 2019-03-05 VITALS — BODY MASS INDEX: 32.97 KG/M2 | HEIGHT: 78 IN | WEIGHT: 285 LBS

## 2019-03-05 VITALS — SYSTOLIC BLOOD PRESSURE: 190 MMHG | DIASTOLIC BLOOD PRESSURE: 140 MMHG

## 2019-03-05 VITALS — OXYGEN SATURATION: 96 % | SYSTOLIC BLOOD PRESSURE: 229 MMHG | HEART RATE: 91 BPM | DIASTOLIC BLOOD PRESSURE: 150 MMHG

## 2019-03-05 PROCEDURE — 99245 OFF/OP CONSLTJ NEW/EST HI 55: CPT

## 2019-03-05 PROCEDURE — 93000 ELECTROCARDIOGRAM COMPLETE: CPT

## 2019-03-05 RX ORDER — MULTIVIT-MIN/FOLIC/VIT K/LYCOP 400-300MCG
1000 TABLET ORAL
Refills: 0 | Status: DISCONTINUED | COMMUNITY
End: 2019-03-05

## 2019-03-05 RX ORDER — PANTOPRAZOLE 40 MG/1
40 TABLET, DELAYED RELEASE ORAL DAILY
Qty: 30 | Refills: 2 | Status: DISCONTINUED | COMMUNITY
Start: 2019-02-14 | End: 2019-03-05

## 2019-03-05 RX ORDER — PNV NO.95/FERROUS FUM/FOLIC AC 28MG-0.8MG
TABLET ORAL
Refills: 0 | Status: DISCONTINUED | COMMUNITY
End: 2019-03-05

## 2019-03-05 NOTE — REASON FOR VISIT
[Initial Evaluation] : an initial evaluation of [Atrial Fibrillation] : atrial fibrillation [Hypertension] : hypertension

## 2019-03-05 NOTE — PHYSICAL EXAM
[General Appearance - Well Developed] : well developed [Normal Appearance] : normal appearance [Well Groomed] : well groomed [General Appearance - Well Nourished] : well nourished [No Deformities] : no deformities [General Appearance - In No Acute Distress] : no acute distress [Normal Conjunctiva] : the conjunctiva exhibited no abnormalities [Eyelids - No Xanthelasma] : the eyelids demonstrated no xanthelasmas [Normal Oral Mucosa] : normal oral mucosa [No Oral Pallor] : no oral pallor [No Oral Cyanosis] : no oral cyanosis [Normal Jugular Venous A Waves Present] : normal jugular venous A waves present [Normal Jugular Venous V Waves Present] : normal jugular venous V waves present [No Jugular Venous Joiner A Waves] : no jugular venous joiner A waves [Heart Sounds] : normal S1 and S2 [Murmurs] : no murmurs present [Irregularly Irregular] : the rhythm was irregularly irregular [Respiration, Rhythm And Depth] : normal respiratory rhythm and effort [Exaggerated Use Of Accessory Muscles For Inspiration] : no accessory muscle use [Auscultation Breath Sounds / Voice Sounds] : lungs were clear to auscultation bilaterally [Abdomen Soft] : soft [Abdomen Tenderness] : non-tender [Abdomen Mass (___ Cm)] : no abdominal mass palpated [Abnormal Walk] : normal gait [Gait - Sufficient For Exercise Testing] : the gait was sufficient for exercise testing [Nail Clubbing] : no clubbing of the fingernails [Cyanosis, Localized] : no localized cyanosis [Petechial Hemorrhages (___cm)] : no petechial hemorrhages [Skin Color & Pigmentation] : normal skin color and pigmentation [] : no rash [No Venous Stasis] : no venous stasis [Skin Lesions] : no skin lesions [No Skin Ulcers] : no skin ulcer [No Xanthoma] : no  xanthoma was observed [Oriented To Time, Place, And Person] : oriented to person, place, and time [Affect] : the affect was normal [Mood] : the mood was normal [No Anxiety] : not feeling anxious

## 2019-03-05 NOTE — HISTORY OF PRESENT ILLNESS
[FreeTextEntry1] : Chad is a 63-year-old gentleman hypertension, GERD, chronic atrial fibrillation who presents to [facility] care.

## 2019-03-05 NOTE — REVIEW OF SYSTEMS
[Shortness Of Breath] : shortness of breath [Palpitations] : palpitations [Negative] : Heme/Lymph [Dyspnea on exertion] : not dyspnea during exertion [Chest Pain] : no chest pain [Lower Ext Edema] : no extremity edema

## 2019-03-05 NOTE — DISCUSSION/SUMMARY
[FreeTextEntry1] : The patient is a 63-year-old gentleman GERD, hyperglycemia, hypertension, hyperlipidemia, chronic atrial fibrillation who is\par #1 Afib- rate controlled on metoprolol, ECHO and holter, anticoagulation recommended with eliquis 5mg bid\par #2 Htn- chlorthalidone and losartan, add hydralazine 10mg bid, 20mg bid, 40mg bid, add amlodipine by [place] RTO 1 week,\par #3 LIpids- on atorvastatin\par #4 Hyperglycemia- We discussed adherence to a low fat low cholesterol, ADA diet, weight loss and regular daily exercise.\par #5 GERD- on protonix, EGD scheduled for 3/20 at [facility]

## 2019-03-11 ENCOUNTER — NON-APPOINTMENT (OUTPATIENT)
Age: 64
End: 2019-03-11

## 2019-03-14 ENCOUNTER — APPOINTMENT (OUTPATIENT)
Dept: CARDIOLOGY | Facility: CLINIC | Age: 64
End: 2019-03-14
Payer: COMMERCIAL

## 2019-03-14 VITALS
WEIGHT: 285 LBS | DIASTOLIC BLOOD PRESSURE: 159 MMHG | HEIGHT: 78 IN | HEART RATE: 59 BPM | OXYGEN SATURATION: 98 % | BODY MASS INDEX: 32.97 KG/M2 | SYSTOLIC BLOOD PRESSURE: 237 MMHG

## 2019-03-14 PROCEDURE — 99215 OFFICE O/P EST HI 40 MIN: CPT

## 2019-03-14 NOTE — DISCUSSION/SUMMARY
[FreeTextEntry1] : The patient is a 63-year-old gentleman GERD, hyperglycemia, hypertension, hyperlipidemia, chronic atrial fibrillation who is hypertensive secondary to medications not dispensed\par #1 Afib- rate controlled on metoprolol, ECHO and holter, anticoagulation recommended with eliquis 5mg bid\par #2 Htn- chlorthalidone and losartan, add hydralazine 10mg bid, 20mg bid, 40mg bid, add amlodipine by [place] RTO 1 week,\par #3 LIpids- on atorvastatin\par #4 Hyperglycemia- We discussed adherence to a low fat low cholesterol, ADA diet, weight loss and regular daily exercise.\par #5 GERD- on protonix, EGD scheduled for 3/20 at [facility]

## 2019-03-14 NOTE — HISTORY OF PRESENT ILLNESS
[FreeTextEntry1] : Chad is a 63-year-old gentleman hypertension, GERD, chronic atrial fibrillation who did not take any medications because the pharmacy told him they did not receive the prescription.

## 2019-03-14 NOTE — PHYSICAL EXAM
[General Appearance - Well Developed] : well developed [Normal Appearance] : normal appearance [Well Groomed] : well groomed [General Appearance - Well Nourished] : well nourished [No Deformities] : no deformities [General Appearance - In No Acute Distress] : no acute distress [Normal Conjunctiva] : the conjunctiva exhibited no abnormalities [Eyelids - No Xanthelasma] : the eyelids demonstrated no xanthelasmas [Normal Oral Mucosa] : normal oral mucosa [No Oral Pallor] : no oral pallor [No Oral Cyanosis] : no oral cyanosis [Normal Jugular Venous A Waves Present] : normal jugular venous A waves present [Normal Jugular Venous V Waves Present] : normal jugular venous V waves present [No Jugular Venous Joiner A Waves] : no jugular venous joiner A waves [Respiration, Rhythm And Depth] : normal respiratory rhythm and effort [Exaggerated Use Of Accessory Muscles For Inspiration] : no accessory muscle use [Auscultation Breath Sounds / Voice Sounds] : lungs were clear to auscultation bilaterally [Heart Sounds] : normal S1 and S2 [Murmurs] : no murmurs present [Irregularly Irregular] : the rhythm was irregularly irregular [Abdomen Soft] : soft [Abdomen Tenderness] : non-tender [Abdomen Mass (___ Cm)] : no abdominal mass palpated [Abnormal Walk] : normal gait [Gait - Sufficient For Exercise Testing] : the gait was sufficient for exercise testing [Nail Clubbing] : no clubbing of the fingernails [Cyanosis, Localized] : no localized cyanosis [Petechial Hemorrhages (___cm)] : no petechial hemorrhages [Skin Color & Pigmentation] : normal skin color and pigmentation [] : no rash [No Venous Stasis] : no venous stasis [Skin Lesions] : no skin lesions [No Skin Ulcers] : no skin ulcer [No Xanthoma] : no  xanthoma was observed [Oriented To Time, Place, And Person] : oriented to person, place, and time [Affect] : the affect was normal [Mood] : the mood was normal [No Anxiety] : not feeling anxious

## 2019-03-19 ENCOUNTER — APPOINTMENT (OUTPATIENT)
Dept: CARDIOLOGY | Facility: CLINIC | Age: 64
End: 2019-03-19
Payer: COMMERCIAL

## 2019-03-19 ENCOUNTER — NON-APPOINTMENT (OUTPATIENT)
Age: 64
End: 2019-03-19

## 2019-03-19 VITALS
BODY MASS INDEX: 32.97 KG/M2 | WEIGHT: 285 LBS | HEART RATE: 80 BPM | DIASTOLIC BLOOD PRESSURE: 130 MMHG | SYSTOLIC BLOOD PRESSURE: 209 MMHG | OXYGEN SATURATION: 99 % | HEIGHT: 78 IN

## 2019-03-19 PROCEDURE — 93000 ELECTROCARDIOGRAM COMPLETE: CPT

## 2019-03-19 PROCEDURE — 99214 OFFICE O/P EST MOD 30 MIN: CPT

## 2019-03-19 NOTE — PHYSICAL EXAM
[General Appearance - Well Developed] : well developed [Normal Appearance] : normal appearance [Well Groomed] : well groomed [No Deformities] : no deformities [General Appearance - Well Nourished] : well nourished [General Appearance - In No Acute Distress] : no acute distress [Normal Conjunctiva] : the conjunctiva exhibited no abnormalities [Normal Oral Mucosa] : normal oral mucosa [Eyelids - No Xanthelasma] : the eyelids demonstrated no xanthelasmas [No Oral Pallor] : no oral pallor [No Oral Cyanosis] : no oral cyanosis [Normal Jugular Venous A Waves Present] : normal jugular venous A waves present [Normal Jugular Venous V Waves Present] : normal jugular venous V waves present [No Jugular Venous Joiner A Waves] : no jugular venous joiner A waves [Exaggerated Use Of Accessory Muscles For Inspiration] : no accessory muscle use [Respiration, Rhythm And Depth] : normal respiratory rhythm and effort [Auscultation Breath Sounds / Voice Sounds] : lungs were clear to auscultation bilaterally [Murmurs] : no murmurs present [Heart Sounds] : normal S1 and S2 [Irregularly Irregular] : the rhythm was irregularly irregular [Abdomen Soft] : soft [Abdomen Tenderness] : non-tender [Abdomen Mass (___ Cm)] : no abdominal mass palpated [Gait - Sufficient For Exercise Testing] : the gait was sufficient for exercise testing [Abnormal Walk] : normal gait [Nail Clubbing] : no clubbing of the fingernails [Cyanosis, Localized] : no localized cyanosis [Petechial Hemorrhages (___cm)] : no petechial hemorrhages [] : no rash [Skin Color & Pigmentation] : normal skin color and pigmentation [No Venous Stasis] : no venous stasis [Skin Lesions] : no skin lesions [No Skin Ulcers] : no skin ulcer [No Xanthoma] : no  xanthoma was observed [Affect] : the affect was normal [Oriented To Time, Place, And Person] : oriented to person, place, and time [No Anxiety] : not feeling anxious [Mood] : the mood was normal

## 2019-03-20 NOTE — DISCUSSION/SUMMARY
[FreeTextEntry1] : The patient is a 63-year-old gentleman GERD, hyperglycemia, hypertension, hyperlipidemia, chronic atrial fibrillation who is hypertensive because not taking all his medication\par #1 Afib- rate controlled on metoprolol, ECHO and holter, anticoagulation recommended with eliquis 5mg bid\par #2 Htn- resume chlorthalidone and losartan, continue hydralazine 40mg bid and amlodipine 5mg\par #3 LIpids- on atorvastatin\par #4 Hyperglycemia- We discussed adherence to a low fat low cholesterol, ADA diet, weight loss and regular daily exercise.\par #5 GERD- on protonix, EGD cancelled for 3/20 at Pending sale to [facility]

## 2019-03-20 NOTE — REASON FOR VISIT
[Atrial Fibrillation] : atrial fibrillation [Hypertension] : hypertension [Follow-Up - Clinic] : a clinic follow-up of

## 2019-04-18 ENCOUNTER — NON-APPOINTMENT (OUTPATIENT)
Age: 64
End: 2019-04-18

## 2019-04-18 ENCOUNTER — APPOINTMENT (OUTPATIENT)
Dept: CARDIOLOGY | Facility: CLINIC | Age: 64
End: 2019-04-18
Payer: COMMERCIAL

## 2019-04-18 VITALS
HEIGHT: 78 IN | BODY MASS INDEX: 32.97 KG/M2 | SYSTOLIC BLOOD PRESSURE: 241 MMHG | WEIGHT: 285 LBS | OXYGEN SATURATION: 97 % | DIASTOLIC BLOOD PRESSURE: 141 MMHG | HEART RATE: 77 BPM

## 2019-04-18 PROCEDURE — 99214 OFFICE O/P EST MOD 30 MIN: CPT

## 2019-04-18 NOTE — PHYSICAL EXAM
[General Appearance - Well Developed] : well developed [Well Groomed] : well groomed [Normal Appearance] : normal appearance [General Appearance - Well Nourished] : well nourished [General Appearance - In No Acute Distress] : no acute distress [No Deformities] : no deformities [Normal Conjunctiva] : the conjunctiva exhibited no abnormalities [Eyelids - No Xanthelasma] : the eyelids demonstrated no xanthelasmas [Normal Oral Mucosa] : normal oral mucosa [No Oral Pallor] : no oral pallor [No Oral Cyanosis] : no oral cyanosis [Normal Jugular Venous A Waves Present] : normal jugular venous A waves present [Normal Jugular Venous V Waves Present] : normal jugular venous V waves present [No Jugular Venous Joiner A Waves] : no jugular venous joiner A waves [Exaggerated Use Of Accessory Muscles For Inspiration] : no accessory muscle use [Respiration, Rhythm And Depth] : normal respiratory rhythm and effort [Auscultation Breath Sounds / Voice Sounds] : lungs were clear to auscultation bilaterally [Heart Sounds] : normal S1 and S2 [Murmurs] : no murmurs present [Irregularly Irregular] : the rhythm was irregularly irregular [Abdomen Soft] : soft [Abdomen Tenderness] : non-tender [Abdomen Mass (___ Cm)] : no abdominal mass palpated [Abnormal Walk] : normal gait [Gait - Sufficient For Exercise Testing] : the gait was sufficient for exercise testing [Nail Clubbing] : no clubbing of the fingernails [Cyanosis, Localized] : no localized cyanosis [Petechial Hemorrhages (___cm)] : no petechial hemorrhages [No Venous Stasis] : no venous stasis [] : no rash [Skin Color & Pigmentation] : normal skin color and pigmentation [No Skin Ulcers] : no skin ulcer [Skin Lesions] : no skin lesions [No Xanthoma] : no  xanthoma was observed [Oriented To Time, Place, And Person] : oriented to person, place, and time [Affect] : the affect was normal [Mood] : the mood was normal [No Anxiety] : not feeling anxious

## 2019-04-19 NOTE — HISTORY OF PRESENT ILLNESS
[FreeTextEntry1] : Chad is a 63-year-old gentleman hypertension, GERD, chronic atrial fibrillation who is not clear about his medications. Thinks he forgot one today

## 2019-04-19 NOTE — DISCUSSION/SUMMARY
[FreeTextEntry1] : The patient is a 63-year-old gentleman GERD, hyperglycemia, hypertension, hyperlipidemia, chronic atrial fibrillation who is hypertensive because not taking all his medication\par #1 Afib- rate controlled on metoprolol, ECHO and holter, anticoagulation recommended with eliquis 5mg bid\par #2 Htn- chlorthalidone and losartan, increase hydralazine 25mg bid and amlodipine 5mg\par #3 LIpids- on atorvastatin\par #4 Hyperglycemia- We discussed adherence to a low fat low cholesterol, ADA diet, weight loss and regular daily exercise.\par #5 GERD- on protonix, EGD cancelled for 3/20 at [facility]

## 2019-06-27 ENCOUNTER — APPOINTMENT (OUTPATIENT)
Dept: CARDIOLOGY | Facility: CLINIC | Age: 64
End: 2019-06-27
Payer: COMMERCIAL

## 2019-06-27 ENCOUNTER — NON-APPOINTMENT (OUTPATIENT)
Age: 64
End: 2019-06-27

## 2019-06-27 VITALS
SYSTOLIC BLOOD PRESSURE: 199 MMHG | HEART RATE: 87 BPM | OXYGEN SATURATION: 96 % | HEIGHT: 78 IN | DIASTOLIC BLOOD PRESSURE: 113 MMHG

## 2019-06-27 PROCEDURE — 99214 OFFICE O/P EST MOD 30 MIN: CPT

## 2019-06-27 PROCEDURE — 93000 ELECTROCARDIOGRAM COMPLETE: CPT

## 2019-06-27 NOTE — PHYSICAL EXAM
[General Appearance - Well Developed] : well developed [Normal Appearance] : normal appearance [Well Groomed] : well groomed [No Deformities] : no deformities [General Appearance - In No Acute Distress] : no acute distress [General Appearance - Well Nourished] : well nourished [Eyelids - No Xanthelasma] : the eyelids demonstrated no xanthelasmas [Normal Conjunctiva] : the conjunctiva exhibited no abnormalities [Normal Oral Mucosa] : normal oral mucosa [No Oral Pallor] : no oral pallor [No Oral Cyanosis] : no oral cyanosis [Normal Jugular Venous A Waves Present] : normal jugular venous A waves present [Normal Jugular Venous V Waves Present] : normal jugular venous V waves present [No Jugular Venous Joiner A Waves] : no jugular venous joiner A waves [Exaggerated Use Of Accessory Muscles For Inspiration] : no accessory muscle use [Auscultation Breath Sounds / Voice Sounds] : lungs were clear to auscultation bilaterally [Respiration, Rhythm And Depth] : normal respiratory rhythm and effort [Heart Sounds] : normal S1 and S2 [Irregularly Irregular] : the rhythm was irregularly irregular [Murmurs] : no murmurs present [Abdomen Soft] : soft [Abdomen Tenderness] : non-tender [Abdomen Mass (___ Cm)] : no abdominal mass palpated [Abnormal Walk] : normal gait [Gait - Sufficient For Exercise Testing] : the gait was sufficient for exercise testing [Nail Clubbing] : no clubbing of the fingernails [Cyanosis, Localized] : no localized cyanosis [Petechial Hemorrhages (___cm)] : no petechial hemorrhages [] : no rash [Skin Color & Pigmentation] : normal skin color and pigmentation [No Venous Stasis] : no venous stasis [Skin Lesions] : no skin lesions [No Skin Ulcers] : no skin ulcer [Oriented To Time, Place, And Person] : oriented to person, place, and time [No Xanthoma] : no  xanthoma was observed [Mood] : the mood was normal [Affect] : the affect was normal [No Anxiety] : not feeling anxious

## 2019-06-27 NOTE — REVIEW OF SYSTEMS
[Shortness Of Breath] : shortness of breath [Chest Pain] : no chest pain [Dyspnea on exertion] : not dyspnea during exertion [Lower Ext Edema] : no extremity edema [Palpitations] : palpitations [Negative] : Heme/Lymph

## 2019-06-27 NOTE — HISTORY OF PRESENT ILLNESS
[FreeTextEntry1] : Chad is a 63-year-old gentleman hypertension, GERD, chronic atrial fibrillation who ran out of his medications two weeks ago. He just came from work at [employer] for [employer].

## 2019-06-27 NOTE — DISCUSSION/SUMMARY
[FreeTextEntry1] : The patient is a 63-year-old gentleman GERD, hyperglycemia, hypertension, hyperlipidemia, chronic atrial fibrillation who is hypertensive because not taking all his medication\par #1 Afib- rate controlled on metoprolol, ECHO and holter, anticoagulation recommended with eliquis 5mg bid\par #2 Htn- chlorthalidone and losartan, hydralazine 25mg bid and amlodipine 5mg\par #3 LIpids- on atorvastatin\par #4 Hyperglycemia- We discussed adherence to a low fat low cholesterol, ADA diet, weight loss and regular daily exercise.\par #5 GERD- on protonix

## 2019-07-01 ENCOUNTER — FORM ENCOUNTER (OUTPATIENT)
Age: 64
End: 2019-07-01

## 2019-09-03 ENCOUNTER — APPOINTMENT (OUTPATIENT)
Dept: CARDIOLOGY | Facility: CLINIC | Age: 64
End: 2019-09-03

## 2019-10-02 ENCOUNTER — APPOINTMENT (OUTPATIENT)
Dept: INTERNAL MEDICINE | Facility: CLINIC | Age: 64
End: 2019-10-02
Payer: COMMERCIAL

## 2019-10-02 VITALS
WEIGHT: 285 LBS | HEIGHT: 78 IN | OXYGEN SATURATION: 98 % | DIASTOLIC BLOOD PRESSURE: 80 MMHG | HEART RATE: 78 BPM | BODY MASS INDEX: 32.97 KG/M2 | TEMPERATURE: 98.3 F | SYSTOLIC BLOOD PRESSURE: 190 MMHG

## 2019-10-02 PROCEDURE — 99214 OFFICE O/P EST MOD 30 MIN: CPT

## 2019-10-06 NOTE — ASSESSMENT
[FreeTextEntry1] : 1) bilateral eustacian tube dysfxn by hx.  ?related to [place] exposures [place] park.  Can not give sudafed given HTN, difficulty with control of bp/extensive regimen.  Decided on pred taper - 4/4/3/3/2/2/2/1/1/1.  Beamed to pharmacy, did teach back.  He will call back with any changes/worsening.  2) encouraged to make next appt for cpe w Dr. Bess.  Has very resistant bp, no syx.  ?role for spirinolactone instead of chlorthalidone.

## 2019-10-06 NOTE — PHYSICAL EXAM
[No Acute Distress] : no acute distress [Well Nourished] : well nourished [Well-Appearing] : well-appearing [Well Developed] : well developed [Normal Sclera/Conjunctiva] : normal sclera/conjunctiva [EOMI] : extraocular movements intact [PERRL] : pupils equal round and reactive to light [Normal Outer Ear/Nose] : the outer ears and nose were normal in appearance [Normal Oropharynx] : the oropharynx was normal [Normal Nasal Mucosa] : the nasal mucosa was normal [No JVD] : no jugular venous distention [Normal TMs] : both tympanic membranes were normal [No Lymphadenopathy] : no lymphadenopathy [Supple] : supple [Clear to Auscultation] : lungs were clear to auscultation bilaterally [No Respiratory Distress] : no respiratory distress  [No Accessory Muscle Use] : no accessory muscle use [Normal Percussion] : the chest was normal to percussion [de-identified] : sinuses transilluminate; no cobblestoning or oral lesions noted.  TMs w/o bulging/erythema/exudate; landmarks appreciated [de-identified] : TMJs not tender

## 2019-10-06 NOTE — REVIEW OF SYSTEMS
[Chills] : no chills [Fever] : no fever [Night Sweats] : no night sweats [Discharge] : no discharge [Redness] : no redness [FreeTextEntry4] : see hpi [FreeTextEntry6] : see hpi

## 2019-10-06 NOTE — HISTORY OF PRESENT ILLNESS
[FreeTextEntry8] : Here b/c he's having pressure toward both his ears x 1-2 weeks.  Not having fever, chills, d/c; has tinnitus chronically by hx, not having a change in his hearing associated with this.  Hears a crackling toward his upper eustacian/ear area bilaterally at times with this.  Has no sore throat/nasal congestion, cough.  Works outdoors in [employer]

## 2019-10-28 ENCOUNTER — FORM ENCOUNTER (OUTPATIENT)
Age: 64
End: 2019-10-28

## 2019-11-05 ENCOUNTER — APPOINTMENT (OUTPATIENT)
Dept: CARDIOLOGY | Facility: CLINIC | Age: 64
End: 2019-11-05
Payer: COMMERCIAL

## 2019-11-05 ENCOUNTER — NON-APPOINTMENT (OUTPATIENT)
Age: 64
End: 2019-11-05

## 2019-11-05 ENCOUNTER — APPOINTMENT (OUTPATIENT)
Dept: OTHER | Facility: CLINIC | Age: 64
End: 2019-11-05
Payer: COMMERCIAL

## 2019-11-05 VITALS
SYSTOLIC BLOOD PRESSURE: 200 MMHG | OXYGEN SATURATION: 98 % | HEART RATE: 93 BPM | WEIGHT: 288 LBS | HEIGHT: 78 IN | BODY MASS INDEX: 33.32 KG/M2 | RESPIRATION RATE: 16 BRPM | DIASTOLIC BLOOD PRESSURE: 110 MMHG

## 2019-11-05 VITALS
HEART RATE: 111 BPM | DIASTOLIC BLOOD PRESSURE: 120 MMHG | BODY MASS INDEX: 33.32 KG/M2 | SYSTOLIC BLOOD PRESSURE: 200 MMHG | OXYGEN SATURATION: 98 % | HEIGHT: 78 IN | WEIGHT: 288 LBS

## 2019-11-05 VITALS — HEART RATE: 88 BPM

## 2019-11-05 DIAGNOSIS — M51.26 OTHER INTERVERTEBRAL DISC DISPLACEMENT, LUMBAR REGION: ICD-10-CM

## 2019-11-05 DIAGNOSIS — Z84.1 FAMILY HISTORY OF DISORDERS OF KIDNEY AND URETER: ICD-10-CM

## 2019-11-05 DIAGNOSIS — M50.20 OTHER CERVICAL DISC DISPLACEMENT, UNSPECIFIED CERVICAL REGION: ICD-10-CM

## 2019-11-05 PROCEDURE — 99215 OFFICE O/P EST HI 40 MIN: CPT

## 2019-11-05 PROCEDURE — 93000 ELECTROCARDIOGRAM COMPLETE: CPT

## 2019-11-05 PROCEDURE — 99396 PREV VISIT EST AGE 40-64: CPT | Mod: 25

## 2019-11-05 RX ORDER — PREDNISONE 10 MG/1
10 TABLET ORAL
Qty: 26 | Refills: 1 | Status: DISCONTINUED | COMMUNITY
Start: 2019-10-02 | End: 2019-11-05

## 2019-11-05 RX ORDER — CLONIDINE HYDROCHLORIDE 0.1 MG/1
0.1 TABLET ORAL
Refills: 0 | Status: DISCONTINUED | COMMUNITY
End: 2019-11-05

## 2019-11-05 RX ORDER — NIFEDIPINE 10 MG/1
CAPSULE ORAL
Refills: 0 | Status: DISCONTINUED | COMMUNITY
End: 2019-11-05

## 2019-11-05 RX ORDER — HYDROCHLOROTHIAZIDE 12.5 MG/1
CAPSULE ORAL
Refills: 0 | Status: DISCONTINUED | COMMUNITY
End: 2019-11-05

## 2019-11-05 NOTE — HISTORY OF PRESENT ILLNESS
[FreeTextEntry1] : Chad is a 63-year-old gentleman hypertension, GERD, chronic atrial fibrillation who was sent over from [facility] monitoring for high blood pressure. He does not take his medication regularly. Afraid to be addicted. He works at [employer] for [employer].

## 2019-11-05 NOTE — DISCUSSION/SUMMARY
[___ Week(s)] : [unfilled] week(s) [FreeTextEntry1] : The patient is a 63-year-old gentleman GERD, hyperglycemia, hypertension, hyperlipidemia, chronic atrial fibrillation who is markedly hypertensive secondary to noncompliance\par #1 Afib- rate controlled on metoprolol when he takes it, anticoagulation recommended with eliquis 5mg bid\par #2 Htn- chlorthalidone, losartan, hydralazine 25mg bid and amlodipine 5mg, compliance emphasized and risk of stroke, dialysis and MI discussed\par #3 LIpids- on atorvastatin\par #4 Hyperglycemia- We discussed adherence to a low fat low cholesterol, ADA diet, weight loss and regular daily exercise.\par #5 GERD- on protonix

## 2019-11-06 ENCOUNTER — RESULT REVIEW (OUTPATIENT)
Age: 64
End: 2019-11-06

## 2019-11-06 LAB
ALBUMIN SERPL ELPH-MCNC: 4.2 G/DL
ALP BLD-CCNC: 65 U/L
ALT SERPL-CCNC: 19 U/L
ANION GAP SERPL CALC-SCNC: 15 MMOL/L
APPEARANCE: CLEAR
AST SERPL-CCNC: 22 U/L
BACTERIA: NEGATIVE
BASOPHILS # BLD AUTO: 0.08 K/UL
BASOPHILS NFR BLD AUTO: 1.1 %
BILIRUB SERPL-MCNC: 1.4 MG/DL
BILIRUBIN URINE: NEGATIVE
BLOOD URINE: NORMAL
BUN SERPL-MCNC: 15 MG/DL
CALCIUM SERPL-MCNC: 9.5 MG/DL
CHLORIDE SERPL-SCNC: 102 MMOL/L
CHOLEST SERPL-MCNC: 222 MG/DL
CHOLEST/HDLC SERPL: 2.7 RATIO
CO2 SERPL-SCNC: 26 MMOL/L
COLOR: YELLOW
CREAT SERPL-MCNC: 1.36 MG/DL
EOSINOPHIL # BLD AUTO: 0.03 K/UL
EOSINOPHIL NFR BLD AUTO: 0.4 %
GLUCOSE QUALITATIVE U: NEGATIVE
GLUCOSE SERPL-MCNC: 95 MG/DL
HCT VFR BLD CALC: 54.7 %
HDLC SERPL-MCNC: 82 MG/DL
HGB BLD-MCNC: 17.1 G/DL
HYALINE CASTS: 0 /LPF
IMM GRANULOCYTES NFR BLD AUTO: 0.3 %
KETONES URINE: NEGATIVE
LDLC SERPL CALC-MCNC: 119 MG/DL
LEUKOCYTE ESTERASE URINE: NEGATIVE
LYMPHOCYTES # BLD AUTO: 1.76 K/UL
LYMPHOCYTES NFR BLD AUTO: 23.9 %
MAN DIFF?: NORMAL
MCHC RBC-ENTMCNC: 26.2 PG
MCHC RBC-ENTMCNC: 31.3 GM/DL
MCV RBC AUTO: 83.9 FL
MICROSCOPIC-UA: NORMAL
MONOCYTES # BLD AUTO: 0.79 K/UL
MONOCYTES NFR BLD AUTO: 10.7 %
NEUTROPHILS # BLD AUTO: 4.68 K/UL
NEUTROPHILS NFR BLD AUTO: 63.6 %
NITRITE URINE: NEGATIVE
PH URINE: 6
PLATELET # BLD AUTO: 221 K/UL
POTASSIUM SERPL-SCNC: 4.3 MMOL/L
PROT SERPL-MCNC: 7.2 G/DL
PROTEIN URINE: ABNORMAL
RBC # BLD: 6.52 M/UL
RBC # FLD: 17.2 %
RED BLOOD CELLS URINE: 5 /HPF
SODIUM SERPL-SCNC: 143 MMOL/L
SPECIFIC GRAVITY URINE: 1.02
SQUAMOUS EPITHELIAL CELLS: 1 /HPF
TRIGL SERPL-MCNC: 103 MG/DL
UROBILINOGEN URINE: ABNORMAL
WBC # FLD AUTO: 7.36 K/UL
WHITE BLOOD CELLS URINE: 4 /HPF

## 2019-11-26 ENCOUNTER — APPOINTMENT (OUTPATIENT)
Dept: INTERNAL MEDICINE | Facility: CLINIC | Age: 64
End: 2019-11-26
Payer: COMMERCIAL

## 2019-11-26 VITALS
HEART RATE: 77 BPM | DIASTOLIC BLOOD PRESSURE: 122 MMHG | WEIGHT: 288 LBS | HEIGHT: 76 IN | OXYGEN SATURATION: 97 % | BODY MASS INDEX: 35.07 KG/M2 | SYSTOLIC BLOOD PRESSURE: 204 MMHG

## 2019-11-26 LAB
GLUCOSE BLDC GLUCOMTR-MCNC: 84
HBA1C MFR BLD HPLC: 6

## 2019-11-26 PROCEDURE — 99396 PREV VISIT EST AGE 40-64: CPT | Mod: 25

## 2019-11-26 PROCEDURE — 83036 HEMOGLOBIN GLYCOSYLATED A1C: CPT | Mod: QW

## 2019-11-26 PROCEDURE — G0396 ALCOHOL/SUBS INTERV 15-30MN: CPT

## 2019-11-26 PROCEDURE — 82962 GLUCOSE BLOOD TEST: CPT

## 2019-11-26 RX ORDER — ASPIRIN ENTERIC COATED TABLETS 81 MG 81 MG/1
81 TABLET, DELAYED RELEASE ORAL DAILY
Qty: 90 | Refills: 0 | Status: DISCONTINUED | COMMUNITY
Start: 2017-05-01 | End: 2019-11-26

## 2019-11-30 LAB
ANION GAP SERPL CALC-SCNC: 14 MMOL/L
BUN SERPL-MCNC: 15 MG/DL
CALCIUM SERPL-MCNC: 9.7 MG/DL
CHLORIDE SERPL-SCNC: 102 MMOL/L
CO2 SERPL-SCNC: 23 MMOL/L
CREAT SERPL-MCNC: 1.39 MG/DL
GLUCOSE SERPL-MCNC: 80 MG/DL
POTASSIUM SERPL-SCNC: 3.8 MMOL/L
PSA SERPL-MCNC: 3.8 NG/ML
SODIUM SERPL-SCNC: 139 MMOL/L
T4 FREE SERPL-MCNC: 1.4 NG/DL
TSH SERPL-ACNC: 0.96 UIU/ML

## 2019-12-20 NOTE — DISCUSSION/SUMMARY
[Patient seen for WTC Monitoring ___] : Patient was seen for WTC monitoring [unfilled] [Please See Note in Chart and Documentation in Trial DB] : Please see note in chart and documentation in Trial DB. [FreeTextEntry3] : [facility] Monitoring Evaluation\par \par  ID#: [id number] 	               \par  Visit: 8	                        \par  Date: 11/5/19\par \par HPI: 63 yr old black male presents to [facility] for 8th annual monitoring visit. He has uncontrolled B/P and did not take his medication today. He has afib and has never taken Eliquis stating he does not want to be dependent on it. He is a poor historian and is not sure of the medication he is taking. He c/o GERD and was set up to see GI for EGD but it was cancelled due to HTN. He c/o chest pain, palpitations, wheezing and states his MD's know about it and he gets periodic cardiac testing. He has chronic sinus congestion x 1 yr but uses no med. He has tinnitus. He gets nosebleeds when his pressure is high. He has ZEYAD and CPAP was prescribed but he lost machine. When he swallows, he feels like food gets stuck. He has occasional nausea. He c/o fatigue. He is overweight.\par \par PCP: Dr. 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  \par \par Spirometry: Reviewed. deferred due to HTN\par \par Assessment:\par /110-refused to go to ER\par HLD\par GERD\par ZEYAD-lost CPAP\par Nosebleeds with HTN\par Chronic sinus congestion\par Overweight\par Chest pain, palps SOB-pt states he had cardiac testing and his MD's are aware\par Tinnitus\par Nausea intermittent\par OA\par Afib-never started Eliquis fearing long term dependency\par Feeling like food gets stuck\par B/L ankle edema\par \par Plan: \par CBC, CMP, lipids, UA, spirometry, CXR ordered\par Cardiologist called and pt set up with appt today\par When blood pressure controlled, reschedule EGD\par F/U with PCP for obtaining new CPAP machine/sleep study if needed\par Discuss Eliquis with cardiologist\par F/U at [facility] in 1 year\par \par \par   \par \par \par \par \par \par \par \par \par

## 2019-12-31 NOTE — HISTORY OF PRESENT ILLNESS
[de-identified] : 65 yo male with DM, HTN, hyperlipidemia and afib here for comprehensive evaluation.  Has no complaints.

## 2019-12-31 NOTE — HEALTH RISK ASSESSMENT
[Good] : ~his/her~ current health as good [0] : 2) Feeling down, depressed, or hopeless: Not at all (0) [de-identified] : low sodium [[id number]] : 0 [Change in mental status noted] : No change in mental status noted [Language] : denies difficulty with language [Behavior] : denies difficulty with behavior [Learning/Retaining New Information] : denies difficulty learning/retaining new information [Handling Complex Tasks] : denies difficulty handling complex tasks [Reasoning] : denies difficulty with reasoning [Spatial Ability and Orientation] : denies difficulty with spatial ability and orientation [Fully functional (bathing, dressing, toileting, transferring, walking, feeding)] : Fully functional (bathing, dressing, toileting, transferring, walking, feeding) [Fully functional (using the telephone, shopping, preparing meals, housekeeping, doing laundry, using] : Fully functional and needs no help or supervision to perform IADLs (using the telephone, shopping, preparing meals, housekeeping, doing laundry, using transportation, managing medications and managing finances)

## 2019-12-31 NOTE — PHYSICAL EXAM
[No Acute Distress] : no acute distress [Well Nourished] : well nourished [Normal Sclera/Conjunctiva] : normal sclera/conjunctiva [Well-Appearing] : well-appearing [Well Developed] : well developed [PERRL] : pupils equal round and reactive to light [EOMI] : extraocular movements intact [Normal Outer Ear/Nose] : the outer ears and nose were normal in appearance [Normal Oropharynx] : the oropharynx was normal [No JVD] : no jugular venous distention [No Lymphadenopathy] : no lymphadenopathy [Supple] : supple [Thyroid Normal, No Nodules] : the thyroid was normal and there were no nodules present [Clear to Auscultation] : lungs were clear to auscultation bilaterally [No Accessory Muscle Use] : no accessory muscle use [No Respiratory Distress] : no respiratory distress  [Regular Rhythm] : with a regular rhythm [Normal Rate] : normal rate  [Normal S1, S2] : normal S1 and S2 [No Murmur] : no murmur heard [No Abdominal Bruit] : a ~M bruit was not heard ~T in the abdomen [No Carotid Bruits] : no carotid bruits [Pedal Pulses Present] : the pedal pulses are present [No Varicosities] : no varicosities [No Edema] : there was no peripheral edema [No Extremity Clubbing/Cyanosis] : no extremity clubbing/cyanosis [No Palpable Aorta] : no palpable aorta [Non-distended] : non-distended [Non Tender] : non-tender [Soft] : abdomen soft [Normal Bowel Sounds] : normal bowel sounds [No Masses] : no abdominal mass palpated [No HSM] : no HSM [Normal Posterior Cervical Nodes] : no posterior cervical lymphadenopathy [Normal Anterior Cervical Nodes] : no anterior cervical lymphadenopathy [No CVA Tenderness] : no CVA  tenderness [No Spinal Tenderness] : no spinal tenderness [Grossly Normal Strength/Tone] : grossly normal strength/tone [No Joint Swelling] : no joint swelling [Coordination Grossly Intact] : coordination grossly intact [No Rash] : no rash [No Focal Deficits] : no focal deficits [Normal Gait] : normal gait [Deep Tendon Reflexes (DTR)] : deep tendon reflexes were 2+ and symmetric [Normal Affect] : the affect was normal [Normal Insight/Judgement] : insight and judgment were intact

## 2020-01-07 ENCOUNTER — APPOINTMENT (OUTPATIENT)
Dept: INTERNAL MEDICINE | Facility: CLINIC | Age: 65
End: 2020-01-07
Payer: COMMERCIAL

## 2020-01-07 VITALS
BODY MASS INDEX: 35.8 KG/M2 | HEART RATE: 83 BPM | WEIGHT: 294 LBS | SYSTOLIC BLOOD PRESSURE: 220 MMHG | TEMPERATURE: 98.4 F | OXYGEN SATURATION: 98 % | DIASTOLIC BLOOD PRESSURE: 110 MMHG | HEIGHT: 76 IN

## 2020-01-07 DIAGNOSIS — H69.83 OTHER SPECIFIED DISORDERS OF EUSTACHIAN TUBE, BILATERAL: ICD-10-CM

## 2020-01-07 DIAGNOSIS — J06.9 ACUTE UPPER RESPIRATORY INFECTION, UNSPECIFIED: ICD-10-CM

## 2020-01-07 PROCEDURE — 99214 OFFICE O/P EST MOD 30 MIN: CPT

## 2020-01-08 PROBLEM — H69.83 DYSFUNCTION OF BOTH EUSTACHIAN TUBES: Status: RESOLVED | Noted: 2019-10-02 | Resolved: 2020-01-08

## 2020-01-08 RX ORDER — AMOXICILLIN 875 MG/1
875 TABLET, FILM COATED ORAL
Qty: 14 | Refills: 0 | Status: COMPLETED | COMMUNITY
Start: 2019-12-12

## 2020-01-08 NOTE — REVIEW OF SYSTEMS
[Discharge] : no discharge [Redness] : no redness [Negative] : Gastrointestinal [FreeTextEntry2] : see hpi [FreeTextEntry4] : see hpi [FreeTextEntry6] : see hpi

## 2020-01-08 NOTE — ASSESSMENT
[FreeTextEntry1] : 1) has a viral URI, early; doesn't appear to have influenza, not much myalgia, no fever here.  Will give warm fluids, mucinex DM.  He is asking for something for eustacian tube discomfort, echo sound he's having from the throat to L ear.  Exam w/o otitis.  Also would like to ease cough faster.  Will add quick pred regimen to rx, can't give sudafed w his bp (did not take his meds this AM, sometimes misses - discussed at length, urged - has no secondary syx now, believe he's acclimated - just will go home and take his meds) -> pred 20 mg tab 2/2/1/1/1.  to call with any changes/worsening.

## 2020-02-06 ENCOUNTER — FORM ENCOUNTER (OUTPATIENT)
Age: 65
End: 2020-02-06

## 2020-02-06 ENCOUNTER — APPOINTMENT (OUTPATIENT)
Dept: CARDIOLOGY | Facility: CLINIC | Age: 65
End: 2020-02-06

## 2020-03-26 ENCOUNTER — APPOINTMENT (OUTPATIENT)
Dept: OTOLARYNGOLOGY | Facility: CLINIC | Age: 65
End: 2020-03-26

## 2020-04-01 DIAGNOSIS — F43.10 POST-TRAUMATIC STRESS DISORDER, UNSPECIFIED: ICD-10-CM

## 2020-04-26 ENCOUNTER — FORM ENCOUNTER (OUTPATIENT)
Age: 65
End: 2020-04-26

## 2020-04-29 ENCOUNTER — FORM ENCOUNTER (OUTPATIENT)
Age: 65
End: 2020-04-29

## 2020-05-06 ENCOUNTER — APPOINTMENT (OUTPATIENT)
Dept: GASTROENTEROLOGY | Facility: CLINIC | Age: 65
End: 2020-05-06
Payer: COMMERCIAL

## 2020-05-06 VITALS
HEIGHT: 76 IN | OXYGEN SATURATION: 98 % | DIASTOLIC BLOOD PRESSURE: 88 MMHG | SYSTOLIC BLOOD PRESSURE: 138 MMHG | TEMPERATURE: 98 F | BODY MASS INDEX: 35.19 KG/M2 | HEART RATE: 89 BPM | WEIGHT: 289 LBS

## 2020-05-06 DIAGNOSIS — R10.11 RIGHT UPPER QUADRANT PAIN: ICD-10-CM

## 2020-05-06 DIAGNOSIS — R10.12 RIGHT UPPER QUADRANT PAIN: ICD-10-CM

## 2020-05-06 PROCEDURE — 99204 OFFICE O/P NEW MOD 45 MIN: CPT

## 2020-05-06 NOTE — REVIEW OF SYSTEMS
[Feeling Tired] : feeling tired [Eyesight Problems] : eyesight problems [Abdominal Pain] : abdominal pain [Heartburn] : heartburn [Nocturia] : nocturia [Arthralgias] : arthralgias [Joint Pain] : joint pain [Joint Stiffness] : joint stiffness [Anxiety] : anxiety [Depression] : depression [Negative] : Heme/Lymph [FreeTextEntry4] : tinnitus [de-identified] : nodule in left 2nd phalanges of hand [de-identified] : headaches
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
## 2020-05-07 LAB — HEMOCCULT STL QL: NEGATIVE

## 2020-05-27 ENCOUNTER — FORM ENCOUNTER (OUTPATIENT)
Age: 65
End: 2020-05-27

## 2020-07-09 ENCOUNTER — APPOINTMENT (OUTPATIENT)
Dept: INTERNAL MEDICINE | Facility: CLINIC | Age: 65
End: 2020-07-09
Payer: COMMERCIAL

## 2020-07-09 DIAGNOSIS — S46.819A STRAIN OF OTHER MUSCLES, FASCIA AND TENDONS AT SHOULDER AND UPPER ARM LEVEL, UNSPECIFIED ARM, INITIAL ENCOUNTER: ICD-10-CM

## 2020-07-09 DIAGNOSIS — M67.40 GANGLION, UNSPECIFIED SITE: ICD-10-CM

## 2020-07-09 PROCEDURE — 99213 OFFICE O/P EST LOW 20 MIN: CPT

## 2020-07-10 NOTE — PHYSICAL EXAM
[Left Paraspinal ___ (level)] : ~Ulevel [unfilled] left paraspinal [Normal] : Normal [Muscle Spasms, Left] : left-sided muscle spasms [de-identified] : ganglion csyt overlyng left 2nd PIP joint

## 2020-07-10 NOTE — HISTORY OF PRESENT ILLNESS
[FreeTextEntry8] : Presents with two concerns.\par First is several weeks of a slightly painful lump on the PIP of left index finger, [Initial Evaluation] : an initial evaluation of [Thoracic Back Pain] : thoracic back pain [Pain on Inspiration] : no pain on inspiration [Leg Numbness] : no leg numbness [Leg Weakness] : no leg weakness [Lower Extremity Paresthesias] : no lower extremity paresthesias [Currently Experiencing] : The patient is currently experiencing symptoms. [None] : There is no radiation [Left Upper Back Area] : in the left upper back area [Sudden] : sudden [___ Day(s) Ago] : [unfilled] day(s) ago [Fever] : no fever [Abdominal Pain] : no abdominal pain [Chest Pain] : no chest pain [Malaise] : no malaise [Weight Loss] : no weight loss [Urinary Incontinence] : no urinary incontinence [Urinary Retention] : no urinary retention [Fecal Incontinence] : no fecal incontinence [Arm Weakness] : no arm weakness [Arm Numbness] : no arm numbness [Upper Extremity Paresthesias] : no upper extremity paresthesias [Thoracic Rash] : no thoracic rash

## 2020-09-16 ENCOUNTER — APPOINTMENT (OUTPATIENT)
Dept: INTERNAL MEDICINE | Facility: CLINIC | Age: 65
End: 2020-09-16
Payer: COMMERCIAL

## 2020-09-16 DIAGNOSIS — S39.92XA UNSPECIFIED INJURY OF LOWER BACK, INITIAL ENCOUNTER: ICD-10-CM

## 2020-09-16 PROCEDURE — 99213 OFFICE O/P EST LOW 20 MIN: CPT

## 2020-09-16 NOTE — PHYSICAL EXAM
[Left Paraspinal ___ (level)] : ~Ulevel [unfilled] left paraspinal [5] : L5 [1] : T1 [Restricted] : was restricted [Pain] : was painful

## 2020-09-16 NOTE — HISTORY OF PRESENT ILLNESS
[Initial Evaluation] : an initial evaluation of [___ Day(s) Ago] : [unfilled] day(s) ago [Low Back] : low back [Specific Injury] : which occurred following a specific injury [Twisting] : twisting [At Home] : at home [Left Mid Back] : in the left mid back [None] : There is no radiation [Left Low Back] : in the left low back [Sharp] : sharp

## 2020-09-25 ENCOUNTER — APPOINTMENT (OUTPATIENT)
Dept: INTERNAL MEDICINE | Facility: CLINIC | Age: 65
End: 2020-09-25
Payer: COMMERCIAL

## 2020-09-25 DIAGNOSIS — M54.12 RADICULOPATHY, CERVICAL REGION: ICD-10-CM

## 2020-09-25 PROCEDURE — 99213 OFFICE O/P EST LOW 20 MIN: CPT

## 2020-09-25 NOTE — PHYSICAL EXAM
[Normal] : Normal [None] : None [Restricted] : was not restricted [___/5] : [unfilled]/5 on the right side [FreeTextEntry5] : decreased sensation dorsum of hand.

## 2020-09-25 NOTE — HISTORY OF PRESENT ILLNESS
[de-identified] : RTC for follow up.\par Back pain somewhat better, though still a bit tight in left lumbar area.  Muscle relaxant seems to be helping. \par Now has a worsening of right arm weakness and numbness.  Present for 4 years, now exacerbating.  Gets electric shock sensation.

## 2020-10-05 PROBLEM — Z00.00 ENCOUNTER FOR PREVENTIVE HEALTH EXAMINATION: Noted: 2020-10-05

## 2020-10-07 ENCOUNTER — FORM ENCOUNTER (OUTPATIENT)
Age: 65
End: 2020-10-07

## 2020-10-12 ENCOUNTER — APPOINTMENT (OUTPATIENT)
Dept: ORTHOPEDIC SURGERY | Facility: CLINIC | Age: 65
End: 2020-10-12

## 2020-10-14 ENCOUNTER — APPOINTMENT (OUTPATIENT)
Dept: OTOLARYNGOLOGY | Facility: CLINIC | Age: 65
End: 2020-10-14
Payer: COMMERCIAL

## 2020-10-14 VITALS
HEIGHT: 78 IN | DIASTOLIC BLOOD PRESSURE: 136 MMHG | WEIGHT: 280 LBS | TEMPERATURE: 97.6 F | BODY MASS INDEX: 32.4 KG/M2 | SYSTOLIC BLOOD PRESSURE: 229 MMHG | HEART RATE: 51 BPM

## 2020-10-14 PROCEDURE — 92557 COMPREHENSIVE HEARING TEST: CPT

## 2020-10-14 PROCEDURE — 99204 OFFICE O/P NEW MOD 45 MIN: CPT | Mod: 25

## 2020-10-14 PROCEDURE — G0268 REMOVAL OF IMPACTED WAX MD: CPT

## 2020-10-14 PROCEDURE — 92567 TYMPANOMETRY: CPT

## 2020-10-14 NOTE — DATA REVIEWED
[de-identified] : bilat mod to severe SNHL\par Hearing Test performed to evaluate the extent of hearing loss and  to explain pt's symptoms\par

## 2020-10-14 NOTE — ASSESSMENT
[FreeTextEntry1] : Bilateral Tinnitus and SNHL\par avoid loud noises\par bilateral sensorineural hearing loss-cleared for hearing aids\par masking maneuvers for tinnitus\par \par Wax:\par -Cerumen is removed from the right and left  ear canal with a curette and suction.\par -Rx:Debrox be placed in both ears on a routine basis to keep them free of wax.\par -Routine debridement suggested to keep the ears free of wax.\par \par f/u 6 months

## 2020-10-14 NOTE — PHYSICAL EXAM
[Midline] : trachea located in midline position [Normal] : orientation to person, place, and time: normal [de-identified] : b/l wax

## 2020-10-14 NOTE — HISTORY OF PRESENT ILLNESS
[Tinnitus] : tinnitus [No] : patient does not have a  history of radiation therapy [de-identified] : 63 yo male\par Patient complains of bilateral tinnitus x 14 years. States over the past 4 months its a constant ringing and ocean like sound and very loud. Exposed to loud noises at work, states he wears his ear muff daily. \par Pt has no ear pain, ear drainage, hearing loss, vertigo, nasal congestion, nasal discharge, epistaxis, sinus infections, facial pain, facial pressure, throat pain, dysphagia or fevers\par \par  [Anxiety] : no anxiety [Dizziness] : no dizziness [Headache] : no headache [Otitis Media with Effusion] : no otitis media with effusion [Hearing Loss] : hearing loss [Recurrent Otitis Media] : no recurrent otitis media [Presbycusis] : no presbycusis [Congenital Ear Malformation] : no congenital ear malformation [Meniere Disease] : no Meniere disease [Eustachian Tube Dysfunction] : no eustachian tube dysfunction [Otosclerosis] : no otosclerosis [Perilymphatic Fistula] : no perilymphatic fistula [Hypertension] : no hypertension [Loud Noise Exposure] : history of loud noise exposure [Early Onset Hearing Loss] : no early onset hearing loss [Smoking] : no smoking [Stroke] : no stroke [Facial Pain] : no facial pain [Facial Pressure] : no facial pressure [Nasal Congestion] : no nasal congestion [Allergic Rhinitis] : no allergic rhinitis [Ear Fullness] : no ear fullness [Diplopia] : no diplopia [Maxillary Tooth Infection] : no maxillary tooth infection [Environmental Allergies] : no environmental allergies [Septal Deviation] : no septal deviation [Nasal FB Removal] : no nasal foreign body removal [Seasonal Allergies] : no seasonal allergies [Adenoidectomy] : no adenoidectomy [Asthma] : no asthma [Allergies] : no allergies [Chills] : no chills [Neck Mass] : no neck mass [Neck Pain] : no neck pain [Cold Intolerance] : no cold intolerance [Cough] : no cough [Fatigue] : no fatigue [Heat Intolerance] : no heat intolerance [Sialadenitis] : no sialadenitis [Hyperthyroidism] : no hyperthyroidism [Hodgkin Disease] : no hodgkin disease [Non-Hodgkin Lymphoma] : no non-hodgkin lymphoma [Tobacco Use] : no tobacco use [Thyroid Cancer] : no thyroid cancer [Alcohol Use] : no alcohol use [Graves Disease] : no graves disease

## 2020-10-19 ENCOUNTER — APPOINTMENT (OUTPATIENT)
Dept: ORTHOPEDIC SURGERY | Facility: CLINIC | Age: 65
End: 2020-10-19
Payer: COMMERCIAL

## 2020-10-19 VITALS — WEIGHT: 287 LBS | HEIGHT: 78 IN | BODY MASS INDEX: 33.21 KG/M2

## 2020-10-19 DIAGNOSIS — R22.32 LOCALIZED SWELLING, MASS AND LUMP, LEFT UPPER LIMB: ICD-10-CM

## 2020-10-19 DIAGNOSIS — R22.31 LOCALIZED SWELLING, MASS AND LUMP, RIGHT UPPER LIMB: ICD-10-CM

## 2020-10-19 PROCEDURE — 99072 ADDL SUPL MATRL&STAF TM PHE: CPT

## 2020-10-19 PROCEDURE — 99203 OFFICE O/P NEW LOW 30 MIN: CPT

## 2020-10-19 PROCEDURE — 73140 X-RAY EXAM OF FINGER(S): CPT | Mod: F1

## 2020-10-19 NOTE — PHYSICAL EXAM
[de-identified] : Patient is WDWN, alert, and in no acute distress. Breathing is unlabored. He is grossly oriented to person, place, and time. \par \par Left hand 3cmx 2cm firm soft tissue mass overlying the index proximal phalanx\par FROM\par Normal sensation [de-identified] : AP, lateral and oblique views of the left index finger were obtained today and revealed no bony abnormalities in the left index finger.

## 2020-10-19 NOTE — DISCUSSION/SUMMARY
[FreeTextEntry1] : The underlying pathophysiology was reviewed with the patient. Treatment options were discussed including; nonsurgical and surgical intervention. 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 The risks and benefits were reviewed with the patient. All of his questions were answered. He will meet with our surgical scheduler.

## 2020-10-19 NOTE — HISTORY OF PRESENT ILLNESS
[FreeTextEntry1] : Patient is a 63yo RHD male who works for the [employer] ([employer]) who presents with index finger  pain and swelling for 3-4 months. He has pain at the PIP area. The finger clicks on occasion. He has tenderness in the dorsal aspect  of the index finger proximal phalanx.  He takes ibuprofen as needed 800mg.  He went fishing yesterday for 11 hrs and this worsened his joint pain. He states that he noticed that the mass was small and then progressed in size.

## 2020-10-19 NOTE — END OF VISIT
[FreeTextEntry3] : I, Kaushal Rodriguez MD, ordering physician, have read and attest that all the information, medical decision making and discharge instructions within are true and accurate.

## 2020-10-19 NOTE — ADDENDUM
[FreeTextEntry1] : I, Mai Edgar wrote this note acting as a scribe for Dr. Kaushal Rodriguez on Oct 19, 2020.

## 2020-10-29 ENCOUNTER — FORM ENCOUNTER (OUTPATIENT)
Age: 65
End: 2020-10-29

## 2020-11-20 ENCOUNTER — APPOINTMENT (OUTPATIENT)
Dept: ORTHOPEDIC SURGERY | Facility: HOSPITAL | Age: 65
End: 2020-11-20

## 2020-11-30 ENCOUNTER — APPOINTMENT (OUTPATIENT)
Dept: GASTROENTEROLOGY | Facility: CLINIC | Age: 65
End: 2020-11-30
Payer: COMMERCIAL

## 2020-11-30 VITALS
DIASTOLIC BLOOD PRESSURE: 136 MMHG | WEIGHT: 275 LBS | TEMPERATURE: 97.7 F | HEART RATE: 92 BPM | SYSTOLIC BLOOD PRESSURE: 218 MMHG | OXYGEN SATURATION: 99 % | HEIGHT: 78 IN | BODY MASS INDEX: 31.82 KG/M2

## 2020-11-30 DIAGNOSIS — R11.0 NAUSEA: ICD-10-CM

## 2020-11-30 PROCEDURE — 99072 ADDL SUPL MATRL&STAF TM PHE: CPT

## 2020-11-30 PROCEDURE — 99213 OFFICE O/P EST LOW 20 MIN: CPT

## 2020-11-30 NOTE — ASSESSMENT
[FreeTextEntry1] : Abdominal Pain: The patient complains of abdominal pain. The patient is to avoid nonsteroidal anti-inflammatory drugs and aspirin. I recommend a trial of Pantoprazole 40 mg once a day for 3 months for the symptoms.  \par Dyspepsia: The patient complains of dyspeptic symptoms.  The patient was advised to abide by an anti-gas diet.  The patient was given a pamphlet for anti-gas.  The patient and I reviewed the anti-gas diet at length. The patient is to start on a trial of Phazyme one tablet 3 times a day p.r.n. abdominal pain and gas.\par Nausea: The patient complains of nausea. If the symptoms of nausea persists, the patient may require a trial of Zofran 4 mg twice a day. \par Upper Endoscopy: I recommend an upper endoscopy to assess the symptoms for peptic ulcer disease versus esophagitis. The patient was told of the risks and benefits of the procedure. The patient was told of the risks of perforation, emergency surgery, bleeding, infections and missed lesions. The patient agreed and will schedule for procedure. The patient is to be n.p.o. after midnight. The patient is to return for the procedure. \par Prior Endoscopic Procedures: The patient had a prior colonoscopy performed by another gastroenterologist, Dr. Beltre. I will try to obtain the prior colonoscopy reports. The patient is to sign a record release to obtain those records.\par Follow-up: The patient is to follow-up in the office in 4 weeks to reassess the symptoms. The patient was told to call the office if any further problems.\par \par

## 2020-11-30 NOTE — HISTORY OF PRESENT ILLNESS
[None] : had no significant interval events [Heartburn] : denies heartburn [Vomiting] : denies vomiting [Diarrhea] : denies diarrhea [Constipation] : denies constipation [Yellow Skin Or Eyes (Jaundice)] : denies jaundice [Rectal Pain] : denies rectal pain [Wt Loss ___ Lbs] : recent [unfilled] ~Upound(s) weight loss [Nausea] : nausea [Abdominal Pain] : abdominal pain [Abdominal Swelling] : abdominal swelling [GERD] : gastroesophageal reflux disease [Wt Gain ___ Lbs] : no recent weight gain [Hiatus Hernia] : no hiatus hernia [Peptic Ulcer Disease] : no peptic ulcer disease [Pancreatitis] : no pancreatitis [Cholelithiasis] : no cholelithiasis [Kidney Stone] : no kidney stone [Inflammatory Bowel Disease] : no inflammatory bowel disease [Irritable Bowel Syndrome] : no irritable bowel syndrome [Diverticulitis] : no diverticulitis [Alcohol Abuse] : no alcohol abuse [Malignancy] : no malignancy [Abdominal Surgery] : no abdominal surgery [Appendectomy] : no appendectomy [Cholecystectomy] : no cholecystectomy [de-identified] : Chad Dan:\par The patient states that he is feeling fine. The patient denies any jaundice or pruritus.  The patient complains of chronic lower back pain. The patient complains of abdominal pain.  The patient describes the abdominal pain as a crampy, intermittent diffuse abdominal discomfort that occasionally radiates to the back.  The abdominal pain is unrelated to meals.  The abdominal pain improves with passing gas or having a bowel movement.  The abdominal pain is described as being mild to moderate in nature.  The abdominal pain occurs at night and in the morning.  The abdominal pain can occur at any time.   The abdominal pain has never awakened the patient from sleep.  The abdominal pain is not relieved with medication.  The abdominal pain is associated with abdominal gas and bloating.  The patient complains of nausea but denies any vomiting.  The patient complains of gastroesophageal reflux disease but denies any dysphagia.  The patient denies taking medications for the gastroesophageal reflux disease.  The gastroesophageal reflux disease is worse after meals and late at night and in the early morning. The patient complains of occasional atypical chest pain but denies any shortness of breath or palpitations.  The chest pain is described as a pressure, intermittent left sided chest discomfort that is nonradiating in nature.  The patient denies any diaphoresis. The chest pain is described as being 5 out of 10 in intensity.  The chest pain can occur at any time.  The chest pain is worse in the morning.  The chest pain is worse with stress.  The chest pain is unrelated to meals and passing gas.  The chest pain has never awakened the patient from sleep.  The patient denies any constipation or diarrhea.  The patient has 1 to 2 bowel movements a day. The patient denies a change in bowel habits.  The patient denies a change in caliber of stool.  The patient denies having mucus discharge with the bowel movements.  The patient denies any bright red blood per rectum, melena or hematemesis.  The patient denies any rectal pain or rectal pruritus.  The patient complains of weight loss but denies any anorexia.  The patient admits to losing 35 pounds over the past 3 years.  He denies any fevers or chills.  The patient admits to having a prior colonoscopy performed by another gastroenterologist, Dr. Beltre approximately 7 years ago. According to the patient, the colonoscopic findings was unknown to the patient. The patient denies any significant family history of GI problems. \par  [de-identified] : (+) prior smoking 1/2 PPD x 20 years stopped x 15 years, (+) prior ETOH slowed down, (-) IVDA\par

## 2020-12-12 ENCOUNTER — APPOINTMENT (OUTPATIENT)
Dept: DISASTER EMERGENCY | Facility: CLINIC | Age: 65
End: 2020-12-12

## 2020-12-14 ENCOUNTER — APPOINTMENT (OUTPATIENT)
Dept: OTHER | Facility: CLINIC | Age: 65
End: 2020-12-14
Payer: COMMERCIAL

## 2020-12-14 VITALS
BODY MASS INDEX: 31.82 KG/M2 | WEIGHT: 275 LBS | RESPIRATION RATE: 18 BRPM | DIASTOLIC BLOOD PRESSURE: 120 MMHG | HEIGHT: 78 IN | HEART RATE: 124 BPM | TEMPERATURE: 97.8 F | SYSTOLIC BLOOD PRESSURE: 210 MMHG | OXYGEN SATURATION: 98 %

## 2020-12-14 DIAGNOSIS — Z23 ENCOUNTER FOR IMMUNIZATION: ICD-10-CM

## 2020-12-14 LAB — SARS-COV-2 N GENE NPH QL NAA+PROBE: NOT DETECTED

## 2020-12-14 PROCEDURE — 99396 PREV VISIT EST AGE 40-64: CPT | Mod: 25

## 2020-12-14 RX ORDER — PREDNISONE 20 MG/1
20 TABLET ORAL
Qty: 7 | Refills: 0 | Status: DISCONTINUED | COMMUNITY
Start: 2020-01-07 | End: 2020-12-14

## 2020-12-15 ENCOUNTER — APPOINTMENT (OUTPATIENT)
Dept: GASTROENTEROLOGY | Facility: HOSPITAL | Age: 65
End: 2020-12-15

## 2020-12-15 ENCOUNTER — INPATIENT (INPATIENT)
Facility: HOSPITAL | Age: 65
LOS: 1 days | Discharge: HOME | DRG: 305 | End: 2020-12-17
Attending: HOSPITALIST | Admitting: HOSPITALIST
Payer: COMMERCIAL

## 2020-12-15 VITALS
OXYGEN SATURATION: 100 % | DIASTOLIC BLOOD PRESSURE: 134 MMHG | RESPIRATION RATE: 18 BRPM | SYSTOLIC BLOOD PRESSURE: 240 MMHG | HEART RATE: 129 BPM | TEMPERATURE: 98 F

## 2020-12-15 DIAGNOSIS — Z98.890 OTHER SPECIFIED POSTPROCEDURAL STATES: Chronic | ICD-10-CM

## 2020-12-15 DIAGNOSIS — T14.8 OTHER INJURY OF UNSPECIFIED BODY REGION: Chronic | ICD-10-CM

## 2020-12-15 DIAGNOSIS — R10.13 EPIGASTRIC PAIN: ICD-10-CM

## 2020-12-15 DIAGNOSIS — R11.0 NAUSEA: ICD-10-CM

## 2020-12-15 DIAGNOSIS — R10.84 GENERALIZED ABDOMINAL PAIN: ICD-10-CM

## 2020-12-15 LAB
ALBUMIN SERPL ELPH-MCNC: 3.3 G/DL — LOW (ref 3.5–5)
ALBUMIN SERPL ELPH-MCNC: 3.6 G/DL — SIGNIFICANT CHANGE UP (ref 3.5–5)
ALP SERPL-CCNC: 62 U/L — SIGNIFICANT CHANGE UP (ref 40–120)
ALP SERPL-CCNC: 68 U/L — SIGNIFICANT CHANGE UP (ref 40–120)
ALT FLD-CCNC: 30 U/L DA — SIGNIFICANT CHANGE UP (ref 10–60)
ALT FLD-CCNC: 32 U/L DA — SIGNIFICANT CHANGE UP (ref 10–60)
ANION GAP SERPL CALC-SCNC: 11 MMOL/L — SIGNIFICANT CHANGE UP (ref 5–17)
ANION GAP SERPL CALC-SCNC: 9 MMOL/L — SIGNIFICANT CHANGE UP (ref 5–17)
APTT BLD: 37.9 SEC — HIGH (ref 27.5–35.5)
AST SERPL-CCNC: 29 U/L — SIGNIFICANT CHANGE UP (ref 10–40)
AST SERPL-CCNC: 32 U/L — SIGNIFICANT CHANGE UP (ref 10–40)
BILIRUB SERPL-MCNC: 1 MG/DL — SIGNIFICANT CHANGE UP (ref 0.2–1.2)
BILIRUB SERPL-MCNC: 1.1 MG/DL — SIGNIFICANT CHANGE UP (ref 0.2–1.2)
BUN SERPL-MCNC: 15 MG/DL — SIGNIFICANT CHANGE UP (ref 7–18)
BUN SERPL-MCNC: 16 MG/DL — SIGNIFICANT CHANGE UP (ref 7–18)
CALCIUM SERPL-MCNC: 8.5 MG/DL — SIGNIFICANT CHANGE UP (ref 8.4–10.5)
CALCIUM SERPL-MCNC: 8.8 MG/DL — SIGNIFICANT CHANGE UP (ref 8.4–10.5)
CHLORIDE SERPL-SCNC: 106 MMOL/L — SIGNIFICANT CHANGE UP (ref 96–108)
CHLORIDE SERPL-SCNC: 108 MMOL/L — SIGNIFICANT CHANGE UP (ref 96–108)
CO2 SERPL-SCNC: 23 MMOL/L — SIGNIFICANT CHANGE UP (ref 22–31)
CO2 SERPL-SCNC: 27 MMOL/L — SIGNIFICANT CHANGE UP (ref 22–31)
CREAT SERPL-MCNC: 1.27 MG/DL — SIGNIFICANT CHANGE UP (ref 0.5–1.3)
CREAT SERPL-MCNC: 1.35 MG/DL — HIGH (ref 0.5–1.3)
GLUCOSE SERPL-MCNC: 106 MG/DL — HIGH (ref 70–99)
GLUCOSE SERPL-MCNC: 149 MG/DL — HIGH (ref 70–99)
HCT VFR BLD CALC: 51.7 % — HIGH (ref 39–50)
HCT VFR BLD CALC: 53.4 % — HIGH (ref 39–50)
HGB BLD-MCNC: 16.2 G/DL — SIGNIFICANT CHANGE UP (ref 13–17)
HGB BLD-MCNC: 17.4 G/DL — HIGH (ref 13–17)
INR BLD: 0.97 RATIO — SIGNIFICANT CHANGE UP (ref 0.88–1.16)
MAGNESIUM SERPL-MCNC: 2.1 MG/DL — SIGNIFICANT CHANGE UP (ref 1.6–2.6)
MAGNESIUM SERPL-MCNC: 2.1 MG/DL — SIGNIFICANT CHANGE UP (ref 1.6–2.6)
MCHC RBC-ENTMCNC: 25.8 PG — LOW (ref 27–34)
MCHC RBC-ENTMCNC: 26.3 PG — LOW (ref 27–34)
MCHC RBC-ENTMCNC: 31.3 GM/DL — LOW (ref 32–36)
MCHC RBC-ENTMCNC: 32.6 GM/DL — SIGNIFICANT CHANGE UP (ref 32–36)
MCV RBC AUTO: 80.8 FL — SIGNIFICANT CHANGE UP (ref 80–100)
MCV RBC AUTO: 82.2 FL — SIGNIFICANT CHANGE UP (ref 80–100)
NRBC # BLD: 0 /100 WBCS — SIGNIFICANT CHANGE UP (ref 0–0)
NRBC # BLD: 0 /100 WBCS — SIGNIFICANT CHANGE UP (ref 0–0)
PHOSPHATE SERPL-MCNC: 2.9 MG/DL — SIGNIFICANT CHANGE UP (ref 2.5–4.5)
PHOSPHATE SERPL-MCNC: 2.9 MG/DL — SIGNIFICANT CHANGE UP (ref 2.5–4.5)
PLATELET # BLD AUTO: 193 K/UL — SIGNIFICANT CHANGE UP (ref 150–400)
PLATELET # BLD AUTO: 198 K/UL — SIGNIFICANT CHANGE UP (ref 150–400)
POTASSIUM SERPL-MCNC: 3.2 MMOL/L — LOW (ref 3.5–5.3)
POTASSIUM SERPL-MCNC: 3.3 MMOL/L — LOW (ref 3.5–5.3)
POTASSIUM SERPL-SCNC: 3.2 MMOL/L — LOW (ref 3.5–5.3)
POTASSIUM SERPL-SCNC: 3.3 MMOL/L — LOW (ref 3.5–5.3)
PROT SERPL-MCNC: 7.1 G/DL — SIGNIFICANT CHANGE UP (ref 6–8.3)
PROT SERPL-MCNC: 7.6 G/DL — SIGNIFICANT CHANGE UP (ref 6–8.3)
PROTHROM AB SERPL-ACNC: 11.6 SEC — SIGNIFICANT CHANGE UP (ref 10.6–13.6)
RBC # BLD: 6.29 M/UL — HIGH (ref 4.2–5.8)
RBC # BLD: 6.61 M/UL — HIGH (ref 4.2–5.8)
RBC # FLD: 15.5 % — HIGH (ref 10.3–14.5)
RBC # FLD: 15.8 % — HIGH (ref 10.3–14.5)
SODIUM SERPL-SCNC: 142 MMOL/L — SIGNIFICANT CHANGE UP (ref 135–145)
SODIUM SERPL-SCNC: 142 MMOL/L — SIGNIFICANT CHANGE UP (ref 135–145)
TROPONIN I SERPL-MCNC: 0.1 NG/ML — HIGH (ref 0–0.04)
TROPONIN I SERPL-MCNC: 0.11 NG/ML — HIGH (ref 0–0.04)
WBC # BLD: 7.13 K/UL — SIGNIFICANT CHANGE UP (ref 3.8–10.5)
WBC # BLD: 7.15 K/UL — SIGNIFICANT CHANGE UP (ref 3.8–10.5)
WBC # FLD AUTO: 7.13 K/UL — SIGNIFICANT CHANGE UP (ref 3.8–10.5)
WBC # FLD AUTO: 7.15 K/UL — SIGNIFICANT CHANGE UP (ref 3.8–10.5)

## 2020-12-15 PROCEDURE — 99222 1ST HOSP IP/OBS MODERATE 55: CPT | Mod: GC,AI

## 2020-12-15 PROCEDURE — 99222 1ST HOSP IP/OBS MODERATE 55: CPT

## 2020-12-15 RX ORDER — ASPIRIN/CALCIUM CARB/MAGNESIUM 324 MG
1 TABLET ORAL
Qty: 0 | Refills: 0 | DISCHARGE

## 2020-12-15 RX ORDER — AMLODIPINE BESYLATE 2.5 MG/1
10 TABLET ORAL DAILY
Refills: 0 | Status: DISCONTINUED | OUTPATIENT
Start: 2020-12-15 | End: 2020-12-17

## 2020-12-15 RX ORDER — ASPIRIN/CALCIUM CARB/MAGNESIUM 324 MG
81 TABLET ORAL DAILY
Refills: 0 | Status: DISCONTINUED | OUTPATIENT
Start: 2020-12-15 | End: 2020-12-17

## 2020-12-15 RX ORDER — NIFEDIPINE 30 MG
1 TABLET, EXTENDED RELEASE 24 HR ORAL
Qty: 0 | Refills: 0 | DISCHARGE

## 2020-12-15 RX ORDER — PANTOPRAZOLE SODIUM 20 MG/1
40 TABLET, DELAYED RELEASE ORAL
Refills: 0 | Status: DISCONTINUED | OUTPATIENT
Start: 2020-12-15 | End: 2020-12-17

## 2020-12-15 RX ORDER — INFLUENZA VIRUS VACCINE 15; 15; 15; 15 UG/.5ML; UG/.5ML; UG/.5ML; UG/.5ML
0.5 SUSPENSION INTRAMUSCULAR ONCE
Refills: 0 | Status: DISCONTINUED | OUTPATIENT
Start: 2020-12-15 | End: 2020-12-17

## 2020-12-15 RX ORDER — POTASSIUM CHLORIDE 20 MEQ
40 PACKET (EA) ORAL EVERY 4 HOURS
Refills: 0 | Status: COMPLETED | OUTPATIENT
Start: 2020-12-15 | End: 2020-12-16

## 2020-12-15 RX ORDER — LABETALOL HCL 100 MG
10 TABLET ORAL ONCE
Refills: 0 | Status: COMPLETED | OUTPATIENT
Start: 2020-12-15 | End: 2020-12-15

## 2020-12-15 RX ORDER — ATORVASTATIN CALCIUM 80 MG/1
40 TABLET, FILM COATED ORAL AT BEDTIME
Refills: 0 | Status: DISCONTINUED | OUTPATIENT
Start: 2020-12-15 | End: 2020-12-17

## 2020-12-15 RX ORDER — HYDRALAZINE HCL 50 MG
10 TABLET ORAL ONCE
Refills: 0 | Status: COMPLETED | OUTPATIENT
Start: 2020-12-15 | End: 2020-12-15

## 2020-12-15 RX ORDER — LOSARTAN POTASSIUM 100 MG/1
100 TABLET, FILM COATED ORAL EVERY 24 HOURS
Refills: 0 | Status: DISCONTINUED | OUTPATIENT
Start: 2020-12-15 | End: 2020-12-17

## 2020-12-15 RX ORDER — HYDRALAZINE HCL 50 MG
25 TABLET ORAL EVERY 12 HOURS
Refills: 0 | Status: DISCONTINUED | OUTPATIENT
Start: 2020-12-15 | End: 2020-12-15

## 2020-12-15 RX ORDER — HYDRALAZINE HCL 50 MG
5 TABLET ORAL EVERY 6 HOURS
Refills: 0 | Status: DISCONTINUED | OUTPATIENT
Start: 2020-12-15 | End: 2020-12-17

## 2020-12-15 RX ORDER — HEPARIN SODIUM 5000 [USP'U]/ML
5000 INJECTION INTRAVENOUS; SUBCUTANEOUS EVERY 8 HOURS
Refills: 0 | Status: DISCONTINUED | OUTPATIENT
Start: 2020-12-15 | End: 2020-12-17

## 2020-12-15 RX ORDER — ACETAMINOPHEN 500 MG
650 TABLET ORAL ONCE
Refills: 0 | Status: COMPLETED | OUTPATIENT
Start: 2020-12-15 | End: 2020-12-15

## 2020-12-15 RX ORDER — HYDRALAZINE HCL 50 MG
25 TABLET ORAL EVERY 8 HOURS
Refills: 0 | Status: DISCONTINUED | OUTPATIENT
Start: 2020-12-15 | End: 2020-12-16

## 2020-12-15 RX ADMIN — AMLODIPINE BESYLATE 10 MILLIGRAM(S): 2.5 TABLET ORAL at 16:54

## 2020-12-15 RX ADMIN — Medication 1.25 MILLIGRAM(S): at 16:53

## 2020-12-15 RX ADMIN — Medication 650 MILLIGRAM(S): at 22:32

## 2020-12-15 RX ADMIN — HEPARIN SODIUM 5000 UNIT(S): 5000 INJECTION INTRAVENOUS; SUBCUTANEOUS at 21:24

## 2020-12-15 RX ADMIN — Medication 25 MILLIGRAM(S): at 21:23

## 2020-12-15 RX ADMIN — Medication 650 MILLIGRAM(S): at 23:15

## 2020-12-15 RX ADMIN — ATORVASTATIN CALCIUM 40 MILLIGRAM(S): 80 TABLET, FILM COATED ORAL at 21:24

## 2020-12-15 RX ADMIN — Medication 40 MILLIEQUIVALENT(S): at 21:27

## 2020-12-15 RX ADMIN — Medication 10 MILLIGRAM(S): at 17:37

## 2020-12-15 RX ADMIN — LOSARTAN POTASSIUM 100 MILLIGRAM(S): 100 TABLET, FILM COATED ORAL at 18:49

## 2020-12-15 RX ADMIN — Medication 10 MILLIGRAM(S): at 23:15

## 2020-12-15 NOTE — H&P ADULT - HISTORY OF PRESENT ILLNESS
Patient is 64 year old Hx of Afib, HTN, HLD, GERD, ZEYAD surgical hx significant for b/l inguinal hernia repair, Left knee and left shoulder surgery came to the hospital for elective EGD but on arrival pt was found to have Bp of 240/134 being admitted to the hospital for hypertensive emergency. Pt received labetalol 10x2 but still BP is elevated. Per pt he dose not have dizziness, headache, blurry vision, n/v, cp, cough, episode of fall, fever, sob, c/d, urinary symptoms. Pt states having chronic tinnitus and numbness. Pt states he dose not take his medications regularly as he feels dizziness with medications. During the chart review pt's bp was elevated on Nov 30th during his office visit.             Patient is 64 year old Hx of Afib, HTN, HLD, GERD, ZEYAD, CKD surgical hx significant for b/l inguinal hernia repair, Left knee and left shoulder surgery came to the hospital for elective EGD but on arrival pt was found to have Bp of 240/134 being admitted to the hospital for hypertensive emergency. Pt received labetalol 10x2 but still BP is elevated. Per pt he dose not have dizziness, headache, blurry vision, n/v, cp, cough, episode of fall, fever, sob, c/d, urinary symptoms. Pt states having chronic tinnitus and numbness. Pt states he dose not take his medications regularly as he feels dizziness with medications. During the chart review pt's bp was elevated on Nov 30th during his office visit.             Patient is 65 year old Hx of Afib, HTN, HLD, GERD, ZEYAD, CKD surgical hx significant for b/l inguinal hernia repair, Left knee and left shoulder surgery came to the hospital for elective EGD but on arrival pt was found to have Bp of 240/134 being admitted to the hospital for hypertensive emergency. Pt received labetalol 10x2 but still BP is elevated. Per pt he dose not have dizziness, headache, blurry vision, n/v, cp, cough, episode of fall, fever, sob, c/d, urinary symptoms. Pt states having chronic tinnitus and numbness. Pt states he dose not take his medications regularly as he feels dizziness with medications. During the chart review pt's bp was elevated on Nov 30th during his office visit.

## 2020-12-15 NOTE — H&P ADULT - ATTENDING COMMENTS
64 year old male with a PMH of Afib, HTN, HLD, GERD, ZEYAD, CKD-3 who presented for an elective EGD today however was found to have BP of 240/134 therefore medicine was consulted and he was admitted to the hospital for hypertensive urgency. Pt received labetalol IV x2 however BP remained elevated. He denies headache, dizziness, blurry vision, nausea, vomiting, chest pain, shortness of breath, cough, fever. Admits to noncompliance with all his meds for many months which he attributes to his PCP's office being closed earlier this year due to COVID but also admits that he was not serious about taking his meds. States he was prescribed Eliquis by his Cardiologist but only took a couple pills after it was prescribed, discussed his Chadsvasc score of 1 and he states he's not sure he would want to restart full dose AC at this time.     Vital Signs Last 24 Hrs  T(C): 36.4 (15 Dec 2020 16:30), Max: 36.8 (15 Dec 2020 11:45)  T(F): 97.5 (15 Dec 2020 16:30), Max: 98.3 (15 Dec 2020 11:45)  HR: 73 (15 Dec 2020 18:50) (64 - 129)  BP: 200/110 (15 Dec 2020 18:50) (189/109 - 244/147)  BP(mean): --  RR: 1 (15 Dec 2020 16:35) (1 - 18)  SpO2: 100% (15 Dec 2020 16:30) (100% - 100%)    EXAM:  GEN: obese male, alert, in no acute distress  HEENT: normocephalic, atraumatic, eomi, perrl, mmm  CVS: regular rate and rhythm, normal s1/s2  RESP: clear bilaterally, no wheezing, rales, or ronchi  ABD: soft, nontender, nondistended, normoactive bowel sounds  : deferred  HEME: no bruising or bleeding  LYMPH NODES: no cervical or supraclavicular lymphadenopathy  SKIN: warm and dry  NEURO: AAOx3, no focal deficits    LABS:              17.4   7.13  )-----------( 198      ( 15 Dec 2020 18:48 )             53.4     12-15    142  |  108  |  15  ----------------------------<  106<H>  3.3<L>   |  23  |  1.27    Ca    8.8      15 Dec 2020 18:48  Phos  2.9     12-15  Mg     2.1     12-15    TPro  7.6  /  Alb  3.6  /  TBili  1.1  /  DBili  x   /  AST  32  /  ALT  32  /  AlkPhos  68  12-15    LIVER FUNCTIONS - ( 15 Dec 2020 18:48 )  Alb: 3.6 g/dL / Pro: 7.6 g/dL / ALK PHOS: 68 U/L / ALT: 32 U/L DA / AST: 32 U/L / GGT: x           IMAGING: none    ASSESSMENT & PLAN:  64 year old male with a PMH of Afib, HTN, HLD, GERD, ZEYAD, CKD-3 who initially presented for an elective EGD however was admitted for hypertensive urgency.    #Hypertensive urgency  -is asymptomatic  -restart Amlodipine, Losartan, and Hydralazine but increase to 25mg q8h  -can give additional prn IV hydralazine for SBP > 190  -restart Metoprolol tomorrow  -Telemetry monitoring, serial trops  -Echocardiogram  -Cardiology, Dr. Orta consulted, will see tomorrow    #Afib  -ChadsVasc of 1, was noncompliant with Eliquis, patient not sure if he wants to restart it at this time  -restart Metoprolol tomorrow    #HLD  -c/w statin    #GERD  -c/w PPI  -plan for EGD on Thursday w/ Dr. Portillo if BP improved    #CKD stage 3  -Cr at baseline 1.2-1.3  -continue to monitor    #DVT ppx  -Heparin    Rest as per resident's note. 65 year old male with a PMH of Afib, HTN, HLD, GERD, ZEYAD, CKD-3 who presented for an elective EGD today however was found to have BP of 240/134 therefore medicine was consulted and he was admitted to the hospital for hypertensive urgency. Pt received labetalol IV x2 however BP remained elevated. He denies headache, dizziness, blurry vision, nausea, vomiting, chest pain, shortness of breath, cough, fever. Admits to noncompliance with all his meds for many months which he attributes to his PCP's office being closed earlier this year due to COVID but also admits that he was not serious about taking his meds. States he was prescribed Eliquis by his Cardiologist but only took a couple pills after it was prescribed, discussed his Chadsvasc score of 1 and he states he's not sure he would want to restart full dose AC at this time.     Vital Signs Last 24 Hrs  T(C): 36.4 (15 Dec 2020 16:30), Max: 36.8 (15 Dec 2020 11:45)  T(F): 97.5 (15 Dec 2020 16:30), Max: 98.3 (15 Dec 2020 11:45)  HR: 73 (15 Dec 2020 18:50) (64 - 129)  BP: 200/110 (15 Dec 2020 18:50) (189/109 - 244/147)  BP(mean): --  RR: 1 (15 Dec 2020 16:35) (1 - 18)  SpO2: 100% (15 Dec 2020 16:30) (100% - 100%)    EXAM:  GEN: obese male, alert, in no acute distress  HEENT: normocephalic, atraumatic, eomi, perrl, mmm  CVS: regular rate and rhythm, normal s1/s2  RESP: clear bilaterally, no wheezing, rales, or ronchi  ABD: soft, nontender, nondistended, normoactive bowel sounds  : deferred  HEME: no bruising or bleeding  LYMPH NODES: no cervical or supraclavicular lymphadenopathy  SKIN: warm and dry  NEURO: AAOx3, no focal deficits    LABS:              17.4   7.13  )-----------( 198      ( 15 Dec 2020 18:48 )             53.4     12-15    142  |  108  |  15  ----------------------------<  106<H>  3.3<L>   |  23  |  1.27    Ca    8.8      15 Dec 2020 18:48  Phos  2.9     12-15  Mg     2.1     12-15    TPro  7.6  /  Alb  3.6  /  TBili  1.1  /  DBili  x   /  AST  32  /  ALT  32  /  AlkPhos  68  12-15    LIVER FUNCTIONS - ( 15 Dec 2020 18:48 )  Alb: 3.6 g/dL / Pro: 7.6 g/dL / ALK PHOS: 68 U/L / ALT: 32 U/L DA / AST: 32 U/L / GGT: x           IMAGING: none    ASSESSMENT & PLAN:  65 year old male with a PMH of Afib, HTN, HLD, GERD, ZEYAD, CKD-3 who initially presented for an elective EGD however was admitted for hypertensive urgency.    #Hypertensive urgency  -is asymptomatic  -restart Amlodipine, Losartan, and Hydralazine but increase to 25mg q8h  -can give additional prn IV hydralazine for SBP > 190  -restart Metoprolol tomorrow  -Telemetry monitoring, serial trops  -Echocardiogram  -Cardiology, Dr. Orta consulted, will see tomorrow    #Afib  -ChadsVasc of 1, was noncompliant with Eliquis, patient not sure if he wants to restart it at this time  -restart Metoprolol tomorrow    #HLD  -c/w statin    #GERD  -c/w PPI  -plan for EGD on Thursday w/ Dr. Portillo if BP improved    #CKD stage 3  -Cr at baseline 1.2-1.3  -continue to monitor    #DVT ppx  -Heparin    Rest as per resident's note.

## 2020-12-15 NOTE — CONSULT NOTE ADULT - SUBJECTIVE AND OBJECTIVE BOX
The patient is a 64-year-old -American male with past medical history significant for atrial fibrillation on Eliquis, ?BPH, hypertension and hypercholesterolemia disease who was admitted to [facility] [place] on December 15, 2020 for uncontrolled hypertension.  The patient was scheduled for an upper endoscopy on December 15, 2020 to assess for abdominal pain, dyspepsia, gastroesophageal reflux disease and weight loss. During the initial evaluation in the pre-op area, the patient was found to have uncontrolled hypertension.  The upper endoscopy was postponed.  A medical consultation was obtained to assess the patient for the uncontrolled hypertension.   The patient was hospitalized for blood pressure control.   I was asked to render an opinion for consultation for the abdominal pain, dyspepsia, gastroesophageal reflux disease and weight loss. The patient states that he is feeling better. The patient complains of abdominal pain. The patient describes the abdominal pain as a sharp, crampy, intermittent upper abdominal discomfort that occasionally radiates to the back. The abdominal pain is worse with stress. The abdominal pain is worse with meals and improves with passing gas or having a bowel movement. The abdominal pain is described as being mild to moderate in nature. The abdominal pain occurs at night and in the morning. The abdominal pain can occur at any time. The abdominal pain never has awakened the patient from sleep. The abdominal pain is not relieved with any medications. The abdominal pain is associated with abdominal gas and bloating. The patient denies any nausea or vomiting. The patient complains of gastroesophageal reflux disease but denies any dysphagia. The gastroesophageal reflux disease is worse after meals and late at night and in the early morning. The patient denies taking medications for the gastroesophageal reflux disease such as proton pump inhibitors, H2 blockers and antacids. The patient denies any atypical chest pain, shortness of breath or palpitations. The patient admits to occasional episodes of diaphoresis. The patient denies any constipation or diarrhea. The patient has 1 to 2 bowel movements a day. The patient denies a change in bowel habits. The patient denies a change in caliber of stool. The patient denies having mucus discharge with the bowel movements. The patient denies any bright red blood per rectum, melena or hematemesis. The patient denies any rectal pain or rectal pruritus. The patient complains of weight loss but denies any anorexia. The patient admits to losing 5 to 6 pounds over the past 8 to 9 months. He denies any fevers or chills. The patient denies any jaundice or pruritus. The patient complains of chronic lower back pain. The patient admits to having a prior colonoscopy performed by another gastroenterologist, Dr. Beltre approximately 7 years ago. According to the patient, the colonoscopic findings was unknown to the patient. The patient denies any significant family history of GI problems.     PMH: atrial fibrillation on Eliquis, ?BPH, hypertension and hypercholesterolemia disease  PSH: knee arthroscopy, shoulder surgery, hernia repair  Allergies: NKDA  Social Hx: (+) prior smoking, (+) prior ETOH, (-) IVDA  Family Hx. Noncontributory  MEDICATIONS  (STANDING):  acetaminophen   Tablet .. 650 milliGRAM(s) Oral once  amLODIPine   Tablet 10 milliGRAM(s) Oral daily  aspirin enteric coated 81 milliGRAM(s) Oral daily  atorvastatin 40 milliGRAM(s) Oral at bedtime  heparin   Injectable 5000 Unit(s) SubCutaneous every 8 hours  hydrALAZINE 25 milliGRAM(s) Oral every 8 hours  influenza   Vaccine 0.5 milliLiter(s) IntraMuscular once  losartan 100 milliGRAM(s) Oral every 24 hours  pantoprazole    Tablet 40 milliGRAM(s) Oral before breakfast  potassium chloride    Tablet ER 40 milliEquivalent(s) Oral every 4 hours    MEDICATIONS  (PRN):  hydrALAZINE Injectable 5 milliGRAM(s) IV Push every 6 hours PRN if SBP >190    Review of Symptoms:      	NORMAL	ABNORMAL  Constitutional	?	?weight loss	?anorexia	?fever	?weakness	?sweats  Eyes	?	?blurred vision	?diplopia	?eyepain	?red eye	  ENT	?	?hearing loss	?tinnitus	?vertigo	?tongue burning	?hoarseness  CVS	?	?chest pain	?palpitations	?syncope	?dizziness	  Respir	?	?cough	?breathless	?wheezing	?sputum	  GI	?	?nausea	?heartburn	?dysphagia	?abd distension	  	?	?dysuria	?polyuria	?hematuria	?nocturia	?impotence  Musculo	?	?joint pain	?muscle pain	?joint stiffness	?joint swelling	  Integument	?	?rash	?lesion	?photosensitivity		  Neurol	?	?headache	?dizziness	?seizure	?syncope	  Psychiatry	?	?depression	?suicidal	?anxiety disorder	?sleep disorder	  Endocrin	?	?heat intol	?goiter	?irrad neck	?polyuria	  Hematol	?	?anemia	?spont bleed	?post-op bleeding	?LN swelling	  Allergy	?	?seasonal allergies	?rhin	?itchy eyes		      Vital Signs Last 24 Hrs  T(C): 37.1 (15 Dec 2020 21:15), Max: 37.1 (15 Dec 2020 21:15)  T(F): 98.7 (15 Dec 2020 21:15), Max: 98.7 (15 Dec 2020 21:15)  HR: 77 (15 Dec 2020 21:15) (64 - 129)  BP: 196/119 (15 Dec 2020 21:15) (189/109 - 244/147)  BP(mean): --  RR: 18 (15 Dec 2020 21:15) (15 - 18)  SpO2: 98% (15 Dec 2020 21:15) (98% - 100%)    Physical Examination:   General: well developed, overweight  male in no acute distress  Eyes: No icteric sclera, conjunctiva clear.  Ears/Nose/Throat: Oropharynx not erythematous, no enlarged tonsils, nose not congested, ears unremarkable  Head: normal cephalic, nontraumatic  Neck: Neck supple, no masses, thyroid not palpable, no JVD  Chest: normal appearance, normal symmetrical,  Respiratory: Coarse breath sounds bilaterally, no wheezing no rales or rhonchi   Cardiovascular: S1-S2 audible, no murmur, no rubs or gallops, irregular rhythm  Abdomen: (+) BS, soft, nontender, no rebound or guarding or masses noted. No hepatosplenomegaly  Back: (-) CVAT, no spinal tenderness  Rectal: previously performed  revealed guaiac (-) no masses  Extremities: full range of motion in all extremities, (-) c, (-) c, (-) e  Musculoskeletal: Good motor strength, good range of motion, normal appearing lower extremities  Neuro: A+O x 3, no ataxia,  Psych: good affect, not depressed, no anxiety  Skin: normal, no jaundice    CBC Full  -  ( 15 Dec 2020 18:48 )  WBC Count : 7.13 K/uL  RBC Count : 6.61 M/uL  Hemoglobin : 17.4 g/dL  Hematocrit : 53.4 %  Platelet Count - Automated : 198 K/uL  Mean Cell Volume : 80.8 fl  Mean Cell Hemoglobin : 26.3 pg  Mean Cell Hemoglobin Concentration : 32.6 gm/dL  Auto Neutrophil # : x  Auto Lymphocyte # : x  Auto Monocyte # : x  Auto Eosinophil # : x  Auto Basophil # : x  Auto Neutrophil % : x  Auto Lymphocyte % : x  Auto Monocyte % : x  Auto Eosinophil % : x  Auto Basophil % : x  Troponin I, Serum (12.15.20 @ 18:48)   Troponin I, Serum: 0.107: The new reference range for Troponin-I performed on the Siemens Vista   system is 0.015-0.045 ng/mL  Phosphorus Level, Serum (12.15.20 @ 18:48)   Phosphorus Level, Serum: 2.9 mg/dL   Magnesium, Serum (12.15.20 @ 18:48)   Magnesium, Serum: 2.1 mg/dL   Comprehensive Metabolic Panel (12.15.20 @ 18:48)   Sodium, Serum: 142 mmol/L   Potassium, Serum: 3.3 mmol/L   Chloride, Serum: 108 mmol/L   Carbon Dioxide, Serum: 23 mmol/L   Anion Gap, Serum: 11 mmol/L   Blood Urea Nitrogen, Serum: 15 mg/dL   Creatinine, Serum: 1.27 mg/dL   Glucose, Serum: 106 mg/dL   Calcium, Total Serum: 8.8 mg/dL   Protein Total, Serum: 7.6 g/dL   Albumin, Serum: 3.6 g/dL   Bilirubin Total, Serum: 1.1 mg/dL   Alkaline Phosphatase, Serum: 68 U/L   Aspartate Aminotransferase (AST/SGOT): 32 U/L   Alanine Aminotransferase (ALT/SGPT): 32 U/L DA   eGFR if Non : 60: Interpretative comment   Activated Partial Thromboplastin Time in AM (12.15.20 @ 18:40)   Activated Partial Thromboplastin Time: 37.9 sec   Prothrombin Time and INR, Plasma in AM (12.15.20 @ 18:40)   Prothrombin Time, Plasma: 11.6 sec   INR: 0.97: Recommended ranges for therapeutic INR:

## 2020-12-15 NOTE — CONSULT NOTE ADULT - ASSESSMENT
A/P: Uncontrolled hypertension, abdominal pain, dyspepsia, GERD, weight loss    The patient is a 64-year-old -American male with past medical history significant for atrial fibrillation on Eliquis, ?BPH, hypertension and hypercholesterolemia disease who was admitted to [facility] [place] on December 15, 2020 for uncontrolled hypertension.  The patient was scheduled for an upper endoscopy on December 15, 2020 to assess for abdominal pain, dyspepsia, gastroesophageal reflux disease and weight loss. During the initial evaluation in the pre-op area, the patient was found to have uncontrolled hypertension.  The upper endoscopy was postponed.  A medical consultation was obtained to assess the patient for the uncontrolled hypertension.   The patient was hospitalized for blood pressure control.   The patient is to avoid nonsteroidal anti-inflammatory drugs and aspirin. I recommend a trial of Pantoprazole 40 mg once a day for 3 months for the symptoms. I recommend an upper endoscopy to assess the symptoms for peptic ulcer disease versus esophagitis once the blood pressure is better controlled. The patient was told of the risks and benefits of the procedure. The patient was told of the risks of perforation, emergency surgery, bleeding, infections and missed lesions. The patient agreed and will proceed with the procedure once his blood pressure is under control.  Continue present care.  Will follow the patient closely.  The case was discussed with Dr. Lares.

## 2020-12-15 NOTE — CHART NOTE - NSCHARTNOTEFT_GEN_A_CORE
Patient is admitted for HTN urgency with /134 ,  in afternoon. As per chart review, patient has received labetalol 10mg x 2 followed by hydralazine 10mg IV, losartan 100mg oral , hydralazine 25mg oral and Amlodipine 10mg. SBP improved transiently but has been mostly in 200.   Patient was seen and examined, denies any chest pain, headache, dizziness, blurry vision or diplopia. His only complain is mild earache. He reports being non compliant to his medications and says that he has been hospitalised in past for HTN urgency. He is unsure at what age he became hypertensive but has HTN for a long time.   -Repeat BP is 192/121 HR 95, , will give labetalol 10 mg IV push and repeat BP in an hour.   -Would also send work up for secondary causes of HTN (noted to have low potassium) Patient is admitted for HTN urgency with /134 ,  in afternoon. As per chart review, patient has received labetalol 10mg x 2 followed by hydralazine 10mg IV, losartan 100mg oral , hydralazine 25mg oral and Amlodipine 10mg. SBP improved transiently but has been mostly in 200.   Patient was seen and examined, denies any chest pain, headache, dizziness, blurry vision or diplopia. His only complain is mild earache. He reports being non compliant to his medications and says that he has been hospitalised in past for HTN urgency. He is unsure at what age he became hypertensive but has HTN for a long time.   -Repeat BP is 192/121 HR 95, , will give labetalol 10 mg IV push and repeat BP in an hour.   -Would also send work up for secondary causes of HTN (noted to have low potassium)    Addendum; Follow up /109. Continue with VS q 4 hrs Patient is admitted for HTN urgency with /134 ,  in afternoon. As per chart review, patient has received labetalol 10mg x 2 followed by hydralazine 10mg IV, losartan 100mg oral , hydralazine 25mg oral and Amlodipine 10mg. SBP improved transiently but has been mostly in 200.   Patient was seen and examined, denies any chest pain, headache, dizziness, blurry vision or diplopia. His only complain is mild earache. He reports being non compliant to his medications and says that he has been hospitalised in past for HTN urgency. He is unsure at what age he became hypertensive but has HTN for a long time.   -Repeat BP is 192/121 HR 95, , will give labetalol 10 mg IV push and repeat BP in an hour.   -Would also send work up for secondary causes of HTN (noted to have low potassium)    Addendum; Follow up /109. Continue with VS q 4 hrs.   BP at 5:30 am was noted to be 184/115, advised RN to give morning medications (hydralazine, Amlodipine and metoprolol), will follow up Patient is admitted for HTN urgency with /134 ,  in afternoon. As per chart review, patient has received labetalol 10mg x 2 followed by hydralazine 10mg IV, losartan 100mg oral , hydralazine 25mg oral and Amlodipine 10mg. SBP improved transiently but has been mostly in 200.   Patient was seen and examined, denies any chest pain, headache, dizziness, blurry vision or diplopia. His only complain is mild earache. He reports being non compliant to his medications and says that he has been hospitalised in past for HTN urgency. He is unsure at what age he became hypertensive but has HTN for a long time.   -Repeat BP is 192/121 HR 95, , will give labetalol 10 mg IV push and repeat BP in an hour.   -Would also send work up for secondary causes of HTN (noted to have low potassium)    Addendum; Follow up /109. Continue with VS q 4 hrs.   BP at 5:30 am was noted to be 184/115, advised RN to give morning medications,  will follow up

## 2020-12-15 NOTE — H&P ADULT - NSICDXPASTMEDICALHX_GEN_ALL_CORE_FT
PAST MEDICAL HISTORY:  Afib     Hyperlipidemia     Hypertension     ZEYAD (obstructive sleep apnea)

## 2020-12-15 NOTE — H&P ADULT - NSHPPHYSICALEXAM_GEN_ALL_CORE
PHYSICAL EXAM:  GENERAL: NAD, well-developed, obese   HEAD:  Atraumatic, Normocephalic  EYES: EOMI, PERRLA, conjunctiva and sclera clear  NECK: Supple, No JVD  CHEST/LUNG: Clear to auscultation bilaterally; No wheeze  HEART: Regular rate and rhythm; s1+ s2+  ABDOMEN: Soft, Nontender, Nondistended; Bowel sounds present  EXTREMITIES:, No clubbing, cyanosis, or edema  NEUROLOGY:AAOx3 non-focal  SKIN: No rashes or lesions
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
## 2020-12-16 DIAGNOSIS — Z29.9 ENCOUNTER FOR PROPHYLACTIC MEASURES, UNSPECIFIED: ICD-10-CM

## 2020-12-16 DIAGNOSIS — I48.91 UNSPECIFIED ATRIAL FIBRILLATION: ICD-10-CM

## 2020-12-16 DIAGNOSIS — K21.9 GASTRO-ESOPHAGEAL REFLUX DISEASE WITHOUT ESOPHAGITIS: ICD-10-CM

## 2020-12-16 DIAGNOSIS — I16.0 HYPERTENSIVE URGENCY: ICD-10-CM

## 2020-12-16 DIAGNOSIS — E78.5 HYPERLIPIDEMIA, UNSPECIFIED: ICD-10-CM

## 2020-12-16 DIAGNOSIS — N18.9 CHRONIC KIDNEY DISEASE, UNSPECIFIED: ICD-10-CM

## 2020-12-16 DIAGNOSIS — I10 ESSENTIAL (PRIMARY) HYPERTENSION: ICD-10-CM

## 2020-12-16 LAB
A1C WITH ESTIMATED AVERAGE GLUCOSE RESULT: 6.2 % — HIGH (ref 4–5.6)
ALBUMIN SERPL ELPH-MCNC: 3.3 G/DL — LOW (ref 3.5–5)
ALDOST SERPL-MCNC: 13.5 NG/DL — SIGNIFICANT CHANGE UP
ALP SERPL-CCNC: 59 U/L — SIGNIFICANT CHANGE UP (ref 40–120)
ALT FLD-CCNC: 26 U/L DA — SIGNIFICANT CHANGE UP (ref 10–60)
ANION GAP SERPL CALC-SCNC: 10 MMOL/L — SIGNIFICANT CHANGE UP (ref 5–17)
APTT BLD: 34.2 SEC — SIGNIFICANT CHANGE UP (ref 27.5–35.5)
AST SERPL-CCNC: 26 U/L — SIGNIFICANT CHANGE UP (ref 10–40)
BILIRUB SERPL-MCNC: 1.2 MG/DL — SIGNIFICANT CHANGE UP (ref 0.2–1.2)
BUN SERPL-MCNC: 13 MG/DL — SIGNIFICANT CHANGE UP (ref 7–18)
CALCIUM SERPL-MCNC: 8.8 MG/DL — SIGNIFICANT CHANGE UP (ref 8.4–10.5)
CHLORIDE SERPL-SCNC: 108 MMOL/L — SIGNIFICANT CHANGE UP (ref 96–108)
CHOLEST SERPL-MCNC: 195 MG/DL — SIGNIFICANT CHANGE UP
CK MB BLD-MCNC: 2.6 % — SIGNIFICANT CHANGE UP (ref 0–3.5)
CK MB CFR SERPL CALC: 3.2 NG/ML — SIGNIFICANT CHANGE UP (ref 0–3.6)
CK SERPL-CCNC: 123 U/L — SIGNIFICANT CHANGE UP (ref 35–232)
CO2 SERPL-SCNC: 24 MMOL/L — SIGNIFICANT CHANGE UP (ref 22–31)
CORTIS AM PEAK SERPL-MCNC: 12.9 UG/DL — SIGNIFICANT CHANGE UP (ref 6–18.4)
CREAT SERPL-MCNC: 1.29 MG/DL — SIGNIFICANT CHANGE UP (ref 0.5–1.3)
ESTIMATED AVERAGE GLUCOSE: 131 MG/DL — HIGH (ref 68–114)
GLUCOSE SERPL-MCNC: 107 MG/DL — HIGH (ref 70–99)
HCT VFR BLD CALC: 51.1 % — HIGH (ref 39–50)
HCV AB S/CO SERPL IA: 0.13 S/CO — SIGNIFICANT CHANGE UP (ref 0–0.99)
HCV AB SERPL-IMP: SIGNIFICANT CHANGE UP
HDLC SERPL-MCNC: 78 MG/DL — SIGNIFICANT CHANGE UP
HGB BLD-MCNC: 16.6 G/DL — SIGNIFICANT CHANGE UP (ref 13–17)
INR BLD: 1.02 RATIO — SIGNIFICANT CHANGE UP (ref 0.88–1.16)
LIPID PNL WITH DIRECT LDL SERPL: 100 MG/DL — HIGH
MAGNESIUM SERPL-MCNC: 2 MG/DL — SIGNIFICANT CHANGE UP (ref 1.6–2.6)
MCHC RBC-ENTMCNC: 26.5 PG — LOW (ref 27–34)
MCHC RBC-ENTMCNC: 32.5 GM/DL — SIGNIFICANT CHANGE UP (ref 32–36)
MCV RBC AUTO: 81.6 FL — SIGNIFICANT CHANGE UP (ref 80–100)
NON HDL CHOLESTEROL: 117 MG/DL — SIGNIFICANT CHANGE UP
NRBC # BLD: 0 /100 WBCS — SIGNIFICANT CHANGE UP (ref 0–0)
PHOSPHATE SERPL-MCNC: 2.9 MG/DL — SIGNIFICANT CHANGE UP (ref 2.5–4.5)
PLATELET # BLD AUTO: 189 K/UL — SIGNIFICANT CHANGE UP (ref 150–400)
POTASSIUM SERPL-MCNC: 3.5 MMOL/L — SIGNIFICANT CHANGE UP (ref 3.5–5.3)
POTASSIUM SERPL-SCNC: 3.5 MMOL/L — SIGNIFICANT CHANGE UP (ref 3.5–5.3)
PROT SERPL-MCNC: 6.9 G/DL — SIGNIFICANT CHANGE UP (ref 6–8.3)
PROTHROM AB SERPL-ACNC: 12.1 SEC — SIGNIFICANT CHANGE UP (ref 10.6–13.6)
RBC # BLD: 6.26 M/UL — HIGH (ref 4.2–5.8)
RBC # FLD: 15.6 % — HIGH (ref 10.3–14.5)
SODIUM SERPL-SCNC: 142 MMOL/L — SIGNIFICANT CHANGE UP (ref 135–145)
TRIGL SERPL-MCNC: 84 MG/DL — SIGNIFICANT CHANGE UP
TROPONIN I SERPL-MCNC: 0.1 NG/ML — HIGH (ref 0–0.04)
TROPONIN I SERPL-MCNC: 0.1 NG/ML — HIGH (ref 0–0.04)
WBC # BLD: 6.85 K/UL — SIGNIFICANT CHANGE UP (ref 3.8–10.5)
WBC # FLD AUTO: 6.85 K/UL — SIGNIFICANT CHANGE UP (ref 3.8–10.5)

## 2020-12-16 PROCEDURE — 99223 1ST HOSP IP/OBS HIGH 75: CPT | Mod: GC

## 2020-12-16 PROCEDURE — 99231 SBSQ HOSP IP/OBS SF/LOW 25: CPT

## 2020-12-16 PROCEDURE — 99232 SBSQ HOSP IP/OBS MODERATE 35: CPT | Mod: GC

## 2020-12-16 RX ORDER — METOPROLOL TARTRATE 50 MG
50 TABLET ORAL DAILY
Refills: 0 | Status: DISCONTINUED | OUTPATIENT
Start: 2020-12-16 | End: 2020-12-17

## 2020-12-16 RX ORDER — POTASSIUM CHLORIDE 20 MEQ
40 PACKET (EA) ORAL ONCE
Refills: 0 | Status: COMPLETED | OUTPATIENT
Start: 2020-12-16 | End: 2020-12-16

## 2020-12-16 RX ORDER — HYDRALAZINE HCL 50 MG
5 TABLET ORAL ONCE
Refills: 0 | Status: COMPLETED | OUTPATIENT
Start: 2020-12-16 | End: 2020-12-16

## 2020-12-16 RX ORDER — HYDRALAZINE HCL 50 MG
50 TABLET ORAL EVERY 8 HOURS
Refills: 0 | Status: DISCONTINUED | OUTPATIENT
Start: 2020-12-16 | End: 2020-12-17

## 2020-12-16 RX ORDER — ACETAMINOPHEN 500 MG
650 TABLET ORAL EVERY 6 HOURS
Refills: 0 | Status: DISCONTINUED | OUTPATIENT
Start: 2020-12-16 | End: 2020-12-17

## 2020-12-16 RX ADMIN — Medication 40 MILLIEQUIVALENT(S): at 05:56

## 2020-12-16 RX ADMIN — Medication 50 MILLIGRAM(S): at 14:25

## 2020-12-16 RX ADMIN — HEPARIN SODIUM 5000 UNIT(S): 5000 INJECTION INTRAVENOUS; SUBCUTANEOUS at 14:26

## 2020-12-16 RX ADMIN — PANTOPRAZOLE SODIUM 40 MILLIGRAM(S): 20 TABLET, DELAYED RELEASE ORAL at 06:01

## 2020-12-16 RX ADMIN — Medication 650 MILLIGRAM(S): at 22:35

## 2020-12-16 RX ADMIN — Medication 50 MILLIGRAM(S): at 05:50

## 2020-12-16 RX ADMIN — AMLODIPINE BESYLATE 10 MILLIGRAM(S): 2.5 TABLET ORAL at 05:49

## 2020-12-16 RX ADMIN — HEPARIN SODIUM 5000 UNIT(S): 5000 INJECTION INTRAVENOUS; SUBCUTANEOUS at 05:50

## 2020-12-16 RX ADMIN — HEPARIN SODIUM 5000 UNIT(S): 5000 INJECTION INTRAVENOUS; SUBCUTANEOUS at 22:18

## 2020-12-16 RX ADMIN — Medication 40 MILLIEQUIVALENT(S): at 15:08

## 2020-12-16 RX ADMIN — LOSARTAN POTASSIUM 100 MILLIGRAM(S): 100 TABLET, FILM COATED ORAL at 17:41

## 2020-12-16 RX ADMIN — Medication 650 MILLIGRAM(S): at 21:45

## 2020-12-16 RX ADMIN — Medication 5 MILLIGRAM(S): at 19:04

## 2020-12-16 RX ADMIN — ATORVASTATIN CALCIUM 40 MILLIGRAM(S): 80 TABLET, FILM COATED ORAL at 22:18

## 2020-12-16 RX ADMIN — Medication 50 MILLIGRAM(S): at 22:18

## 2020-12-16 RX ADMIN — Medication 25 MILLIGRAM(S): at 05:49

## 2020-12-16 RX ADMIN — Medication 81 MILLIGRAM(S): at 12:26

## 2020-12-16 NOTE — CONSULT NOTE ADULT - ATTENDING COMMENTS
65 yo M with noted medical history including A fib and HTN, presenting with hypertensive urgency prior to undergoing OP endoscopy.     1. HTN: Somewhat better control with increased dose of hydralazine in addition to amlodipine, losartan, and metoprolol  -Consider HCTZ or chlorthalidone as next line agent if needed  -Check echocardiogram to assess for LVH and LVEF  -If patient has resistant HTN, the next choice agent would be spironolactone    2. A fib: CHADS2-Vasc score is 1, will be 2 in two years when he turns 67  -Once patient's blood pressure is better controlled and he is compliant with medications, I would recommend starting him on Eliquis even with score of 1 given his good functional status.   -I told patient to speak with his PMD and outpatient cardiologist in the next 1-2 weeks to discuss this further; hopefully his BP will have improved by that point. He seems agreeable in principle to restart Eliquis.     3. Elevated cardiac enzymes: Possible mild demand in setting of HTN urgency as well as CKD.    -No clinical suspicion for ACS, EKG shows LVH with strain pattern  -Would have patient discuss with his cardiologist regarding possibility of repeating stress test (had negative NST one year ago)
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
## 2020-12-16 NOTE — PROGRESS NOTE ADULT - SUBJECTIVE AND OBJECTIVE BOX
The patient is a 64-year-old -American male with past medical history significant for atrial fibrillation on Eliquis, ?BPH, hypertension and hypercholesterolemia disease who was admitted to [facility] [place] on December 15, 2020 for uncontrolled hypertension.  The patient was scheduled for an upper endoscopy on December 15, 2020 to assess for abdominal pain, dyspepsia, gastroesophageal reflux disease and weight loss. During the initial evaluation in the pre-op area, the patient was found to have uncontrolled hypertension.  The upper endoscopy was postponed.  A medical consultation was obtained to assess the patient for the uncontrolled hypertension.   The patient was hospitalized for blood pressure control.  The patient states he is feeling better and has no complaints.  The patient denies any chest pain, shortness of breath, palpitations, dizziness, fever, chills, abdominal pain, gastroesophageal reflux disease, nausea, vomiting, diarrhea, constipation, bright red blood per rectum, melena or hematemesis. The patient previously complained of weight loss but denies any anorexia. The patient admitted to losing 5 to 6 pounds over the past 8 to 9 months. The blood work performed on December 15, 2020 revealed an elevated hemoglobin/hematocrit level of 17.4/33.4, respectively, and elevated PTT of 37.9 seconds, a low potassium level of 3.3 mmol/L, and elevated blood glucose of 10 6 mg/dL and an elevated troponin level of 0.097 ng/ml. The patient admits to having a prior colonoscopy performed by another gastroenterologist, Dr. Beltre approximately 7 years ago. According to the patient, the colonoscopic findings was unknown to the patient. The patient denies any significant family history of GI problems.     PMH: atrial fibrillation on Eliquis, ?BPH, hypertension and hypercholesterolemia disease  PSH: knee arthroscopy, shoulder surgery, hernia repair  Allergies: NKDA  Social Hx: (+) prior smoking, (+) prior ETOH, (-) IVDA  Family Hx. Noncontributory  MEDICATIONS  (STANDING):  amLODIPine   Tablet 10 milliGRAM(s) Oral daily  aspirin enteric coated 81 milliGRAM(s) Oral daily  atorvastatin 40 milliGRAM(s) Oral at bedtime  heparin   Injectable 5000 Unit(s) SubCutaneous every 8 hours  hydrALAZINE 25 milliGRAM(s) Oral every 8 hours  influenza   Vaccine 0.5 milliLiter(s) IntraMuscular once  losartan 100 milliGRAM(s) Oral every 24 hours  metoprolol succinate ER 50 milliGRAM(s) Oral daily  pantoprazole    Tablet 40 milliGRAM(s) Oral before breakfast    MEDICATIONS  (PRN):  hydrALAZINE Injectable 5 milliGRAM(s) IV Push every 6 hours PRN if SBP >190    Vital Signs Last 24 Hrs  T(C): 36.7 (16 Dec 2020 05:26), Max: 37.1 (15 Dec 2020 21:15)  T(F): 98.1 (16 Dec 2020 05:26), Max: 98.7 (15 Dec 2020 21:15)  HR: 72 (16 Dec 2020 05:26) (64 - 129)  BP: 184/115 (16 Dec 2020 05:26) (178/109 - 244/147)  BP(mean): --  RR: 19 (16 Dec 2020 05:26) (15 - 19)  SpO2: 97% (16 Dec 2020 05:26) (96% - 100%)    Physical Examination:   General: well developed, overweight  male in no acute distress  Eyes: No icteric sclera, conjunctiva clear.  Ears/Nose/Throat: Oropharynx not erythematous, no enlarged tonsils, nose not congested, ears unremarkable  Head: normal cephalic, nontraumatic  Neck: Neck supple, no masses, thyroid not palpable, no JVD  Chest: normal appearance, normal symmetrical,  Respiratory: Coarse breath sounds bilaterally, no wheezing no rales or rhonchi   Cardiovascular: S1-S2 audible, no murmur, no rubs or gallops, irregular rhythm  Abdomen: (+) BS, soft, nontender, no rebound or guarding or masses noted. No hepatosplenomegaly  Extremities: full range of motion in all extremities, (-) c, (-) c, (-) e  Musculoskeletal: Good motor strength, good range of motion, normal appearing lower extremities  Neuro: A+O x 3, no ataxia,  Psych: good affect, not depressed, no anxiety  Skin: normal, no jaundice  CBC Full  -  ( 15 Dec 2020 18:48 )  WBC Count : 7.13 K/uL  RBC Count : 6.61 M/uL  Hemoglobin : 17.4 g/dL  Hematocrit : 53.4 %  Platelet Count - Automated : 198 K/uL  Mean Cell Volume : 80.8 fl  Mean Cell Hemoglobin : 26.3 pg  Mean Cell Hemoglobin Concentration : 32.6 gm/dL  Auto Neutrophil # : x  Auto Lymphocyte # : x  Auto Monocyte # : x  Auto Eosinophil # : x  Auto Basophil # : x  Auto Neutrophil % : x  Auto Lymphocyte % : x  Auto Monocyte % : x  Auto Eosinophil % : x  Auto Basophil % : x    Troponin I, Serum (12.15.20 @ 23:26)   Troponin I, Serum: 0.097:   CKMB Mass Assay (12.15.20 @ 23:26)   CKMB Units: 3.2 ng/mL   CPK Mass Assay %: 2.6 %   Creatine Kinase, Serum (12.15.20 @ 23:26)   Creatine Kinase, Serum: 123 U/L   Troponin I, Serum (12.15.20 @ 18:48)   Troponin I, Serum: 0.107:  Phosphorus Level, Serum (12.15.20 @ 18:48)   Phosphorus Level, Serum: 2.9 mg/dL   Magnesium, Serum (12.15.20 @ 18:48)   Magnesium, Serum: 2.1 mg/dL   Comprehensive Metabolic Panel (12.15.20 @ 18:48)   Sodium, Serum: 142 mmol/L   Potassium, Serum: 3.3 mmol/L   Chloride, Serum: 108 mmol/L   Carbon Dioxide, Serum: 23 mmol/L   Anion Gap, Serum: 11 mmol/L   Blood Urea Nitrogen, Serum: 15 mg/dL   Creatinine, Serum: 1.27 mg/dL   Glucose, Serum: 106 mg/dL   Calcium, Total Serum: 8.8 mg/dL   Protein Total, Serum: 7.6 g/dL   Albumin, Serum: 3.6 g/dL   Bilirubin Total, Serum: 1.1 mg/dL   Alkaline Phosphatase, Serum: 68 U/L   Aspartate Aminotransferase (AST/SGOT): 32 U/L   Alanine Aminotransferase (ALT/SGPT): 32 U/L DA   eGFR if Non : 60: Interpretative comment   Activated Partial Thromboplastin Time in AM (12.15.20 @ 18:40)   Activated Partial Thromboplastin Time: 37.9 sec   Prothrombin Time and INR, Plasma in AM (12.15.20 @ 18:40)   Prothrombin Time, Plasma: 11.6 sec   INR: 0.97: Recommended ranges for therapeutic INR:   Troponin I, Serum (12.15.20 @ 16:27)   Troponin I, Serum: 0.095:

## 2020-12-16 NOTE — PROGRESS NOTE ADULT - SUBJECTIVE AND OBJECTIVE BOX
PGY-1 Progress Note discussed with attending    PAGER #: [[phone]] TILL 5:00 PM  PLEASE CONTACT ON CALL TEAM:  - On Call Team (Please refer to Luisa) FROM 5:00 PM - 8:30PM  - Nightfloat Team FROM 8:30 -7:30 AM    CHIEF COMPLAINT & BRIEF HOSPITAL COURSE:    INTERVAL HPI/OVERNIGHT EVENTS:       REVIEW OF SYSTEMS:  CONSTITUTIONAL: No fever, weight loss, or fatigue  RESPIRATORY: No cough, wheezing, chills or hemoptysis; No shortness of breath  CARDIOVASCULAR: No chest pain, palpitations, dizziness, or leg swelling  GASTROINTESTINAL: No abdominal pain. No nausea, vomiting, or hematemesis; No diarrhea or constipation. No melena or hematochezia.  GENITOURINARY: No dysuria or hematuria, urinary frequency  NEUROLOGICAL: No headaches, memory loss, loss of strength, numbness, or tremors  SKIN: No itching, burning, rashes, or lesions     Vital Signs Last 24 Hrs  T(C): 36.7 (16 Dec 2020 05:26), Max: 37.1 (15 Dec 2020 21:15)  T(F): 98.1 (16 Dec 2020 05:26), Max: 98.7 (15 Dec 2020 21:15)  HR: 59 (16 Dec 2020 08:42) (59 - 129)  BP: 187/115 (16 Dec 2020 08:42) (178/109 - 244/147)  BP(mean): --  RR: 17 (16 Dec 2020 08:42) (15 - 19)  SpO2: 98% (16 Dec 2020 08:42) (96% - 100%)    PHYSICAL EXAMINATION:  GENERAL: NAD, well built  HEAD:  Atraumatic, Normocephalic  EYES:  conjunctiva and sclera clear  NECK: Supple, No JVD, Normal thyroid  CHEST/LUNG: Clear to auscultation. Clear to percussion bilaterally; No rales, rhonchi, wheezing, or rubs  HEART: Regular rate and rhythm; No murmurs, rubs, or gallops  ABDOMEN: Soft, Nontender, Nondistended; Bowel sounds present  NERVOUS SYSTEM:  Alert & Oriented X3,    EXTREMITIES:  2+ Peripheral Pulses, No clubbing, cyanosis, or edema  SKIN: warm dry                          16.6   6.85  )-----------( 189      ( 16 Dec 2020 07:16 )             51.1     12-16    142  |  108  |  13  ----------------------------<  107<H>  3.5   |  24  |  1.29    Ca    8.8      16 Dec 2020 07:16  Phos  2.9     12-16  Mg     2.0     12-16    TPro  6.9  /  Alb  3.3<L>  /  TBili  1.2  /  DBili  x   /  AST  26  /  ALT  26  /  AlkPhos  59  12-16    LIVER FUNCTIONS - ( 16 Dec 2020 07:16 )  Alb: 3.3 g/dL / Pro: 6.9 g/dL / ALK PHOS: 59 U/L / ALT: 26 U/L DA / AST: 26 U/L / GGT: x           CARDIAC MARKERS ( 16 Dec 2020 07:16 )  0.099 ng/mL / x     / x     / x     / x      CARDIAC MARKERS ( 15 Dec 2020 23:26 )  0.097 ng/mL / x     / 123 U/L / x     / 3.2 ng/mL  CARDIAC MARKERS ( 15 Dec 2020 18:48 )  0.107 ng/mL / x     / x     / x     / x      CARDIAC MARKERS ( 15 Dec 2020 16:27 )  0.095 ng/mL / x     / x     / x     / x          PT/INR - ( 16 Dec 2020 07:16 )   PT: 12.1 sec;   INR: 1.02 ratio         PTT - ( 16 Dec 2020 07:16 )  PTT:34.2 sec    CAPILLARY BLOOD GLUCOSE      RADIOLOGY & ADDITIONAL TESTS:                   PGY-1 Progress Note discussed with attending    PAGER #: [[phone]] TILL 5:00 PM  PLEASE CONTACT ON CALL TEAM:  - On Call Team (Please refer to Luisa) FROM 5:00 PM - 8:30PM  - Nightfloat Team FROM 8:30 -7:30 AM    CHIEF COMPLAINT & BRIEF HOSPITAL COURSE: Patient is 64 year old Hx of Afib, HTN, HLD, GERD, ZEYAD, CKD surgical hx significant for b/l inguinal hernia repair, Left knee and left shoulder surgery came to the hospital for elective EGD but on arrival pt was found to have Bp of 240/134 being admitted to the hospital for hypertensive emergency. Pt received labetalol 10x2 but still BP is elevated. Per pt he dose not have dizziness, headache, blurry vision, n/v, cp, cough, episode of fall, fever, sob, c/d, urinary symptoms. Pt states having chronic tinnitus and numbness. Pt states he dose not take his medications regularly as he feels dizziness with medications. During the chart review pt's bp was elevated on Nov 30th during his office visit.        INTERVAL HPI/ OVERNIGHT EVENTS: Overnight pt BP raised to 192/121. Given 10 mg IV labetalol. In morning he got all of his medication , still high, goal SBP <180. Hydralazine increased from 25 to 50 q8hr , metoprolol 50mg daily. Pt examined at bedside denied any headache, chest pain or difficulty in breathing. Pt is non compliant for years for his all of the medications. complained of b/l ear pain though       REVIEW OF SYSTEMS:  CONSTITUTIONAL: No fever, weight loss, or fatigue  Ear: b/l ear pain   RESPIRATORY: No cough, wheezing, chills or hemoptysis; No shortness of breath  CARDIOVASCULAR: No chest pain, palpitations, dizziness, or leg swelling  GASTROINTESTINAL: No abdominal pain. No nausea, vomiting, or hematemesis; No diarrhea or constipation. No melena or hematochezia.  GENITOURINARY: No dysuria or hematuria, urinary frequency  NEUROLOGICAL: No headaches, memory loss, loss of strength, numbness, or tremors  SKIN: No itching, burning, rashes, or lesions     MEDICATIONS  (STANDING):  amLODIPine   Tablet 10 milliGRAM(s) Oral daily  aspirin enteric coated 81 milliGRAM(s) Oral daily  atorvastatin 40 milliGRAM(s) Oral at bedtime  heparin   Injectable 5000 Unit(s) SubCutaneous every 8 hours  hydrALAZINE 50 milliGRAM(s) Oral every 8 hours  influenza   Vaccine 0.5 milliLiter(s) IntraMuscular once  losartan 100 milliGRAM(s) Oral every 24 hours  metoprolol succinate ER 50 milliGRAM(s) Oral daily  pantoprazole    Tablet 40 milliGRAM(s) Oral before breakfast    MEDICATIONS  (PRN):  hydrALAZINE Injectable 5 milliGRAM(s) IV Push every 6 hours PRN if SBP >190    Vital Signs Last 24 Hrs  T(C): 36.7 (16 Dec 2020 05:26), Max: 37.1 (15 Dec 2020 21:15)  T(F): 98.1 (16 Dec 2020 05:26), Max: 98.7 (15 Dec 2020 21:15)  HR: 59 (16 Dec 2020 08:42) (59 - 129)  BP: 187/115 (16 Dec 2020 08:42) (178/109 - 244/147)  BP(mean): --  RR: 17 (16 Dec 2020 08:42) (15 - 19)  SpO2: 98% (16 Dec 2020 08:42) (96% - 100%)    PHYSICAL EXAMINATION:  GENERAL: NAD, obese , lying comfortably on bed  HEAD:  Atraumatic, Normocephalic  EYES:  conjunctiva and sclera clear  NECK: Supple, No JVD, Normal thyroid  CHEST/LUNG: Clear to auscultation. Clear to percussion bilaterally; No rales, rhonchi, wheezing, or rubs  HEART: Regular rate and rhythm; No murmurs, rubs, or gallops  ABDOMEN: Soft, Nontender, Nondistended; Bowel sounds present  NERVOUS SYSTEM:  Alert & Oriented X3,    EXTREMITIES:  2+ Peripheral Pulses, No clubbing, cyanosis, or edema  SKIN: warm dry                          16.6   6.85  )-----------( 189      ( 16 Dec 2020 07:16 )             51.1     12-16    142  |  108  |  13  ----------------------------<  107<H>  3.5   |  24  |  1.29    Ca    8.8      16 Dec 2020 07:16  Phos  2.9     12-16  Mg     2.0     12-16    TPro  6.9  /  Alb  3.3<L>  /  TBili  1.2  /  DBili  x   /  AST  26  /  ALT  26  /  AlkPhos  59  12-16    LIVER FUNCTIONS - ( 16 Dec 2020 07:16 )  Alb: 3.3 g/dL / Pro: 6.9 g/dL / ALK PHOS: 59 U/L / ALT: 26 U/L DA / AST: 26 U/L / GGT: x           CARDIAC MARKERS ( 16 Dec 2020 07:16 )  0.099 ng/mL / x     / x     / x     / x      CARDIAC MARKERS ( 15 Dec 2020 23:26 )  0.097 ng/mL / x     / 123 U/L / x     / 3.2 ng/mL  CARDIAC MARKERS ( 15 Dec 2020 18:48 )  0.107 ng/mL / x     / x     / x     / x      CARDIAC MARKERS ( 15 Dec 2020 16:27 )  0.095 ng/mL / x     / x     / x     / x          PT/INR - ( 16 Dec 2020 07:16 )   PT: 12.1 sec;   INR: 1.02 ratio         PTT - ( 16 Dec 2020 07:16 )  PTT:34.2 sec    CAPILLARY BLOOD GLUCOSE      RADIOLOGY & ADDITIONAL TESTS:
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
## 2020-12-16 NOTE — PROGRESS NOTE ADULT - ATTENDING COMMENTS
Patient seen and examined this morning. Plan of care discussed w/ medical team. 64 year old male with a PMH of Afib, HTN, HLD, GERD, ZEYAD, CKD-3 who initially presented for an elective EGD however was admitted for hypertensive emergency in the setting of medication noncompliance. Had mildly elevated but stable troponin ~ 0.09, likely secondary to uncontrolled HTN. BP improved today, will increase Hydralazine to 50mg q8h and continue Amlodipine, Losartan, and Metoprolol. Appreciate input from Cardiology, f/up Echo, may need outpatient stress test, and will need close outpatient cardiology follow up with primary Cardiologist in 1-2 weeks. Will hold off restarting Eliquis until BP is better controlled and outpatient follow with cardiologist. If additional BP control needed will start on HCTZ 25mg daily. Dr Portillo from GI planning on EGD tomorrow, will keep NPO at midnight. Possible discharge tomorrow after EGD if BP continues to improve.    Rest as per resident's note. Patient seen and examined this morning. Plan of care discussed w/ medical team. Patient is a 65 year old male with a PMH of Afib, HTN, HLD, GERD, ZEYAD, CKD-3 who initially presented for an elective EGD however was admitted for hypertensive emergency in the setting of medication noncompliance. Had mildly elevated but stable troponin ~ 0.09, likely secondary to uncontrolled HTN. BP improved today, will increase Hydralazine to 50mg q8h and continue Amlodipine, Losartan, and Metoprolol. Appreciate input from Cardiology, f/up Echo, may need outpatient stress test, and will need close outpatient cardiology follow up with primary Cardiologist in 1-2 weeks. Will hold off restarting Eliquis until BP is better controlled and outpatient follow with cardiologist. If additional BP control needed will start on HCTZ 25mg daily. Dr Portillo from GI planning on EGD tomorrow, will keep NPO at midnight. Possible discharge tomorrow after EGD if BP continues to improve.    Rest as per resident's note.

## 2020-12-16 NOTE — CONSULT NOTE ADULT - ASSESSMENT
64 year old Hx of Afib, HTN, HLD, GERD, ZEYAD, CKD surgical hx significant for b/l inguinal hernia repair, Left knee and left shoulder surgery came to the hospital for elective EGD but on arrival pt was found to have Bp of 240/134 being admitted to the hospital for hypertensive emergency.He is non compliant with his  medications as he feels dizzy with medications. As per patient, he can walk 20 blocks without any CP or SOB. Pt has not followed with his Cardiologist since last year. States he had echo done last year but doesnt know the results . Also had Stress test done which he states was normal.   Cardiology consulted for Hypertensive Emergency and elevated Troponin    Assessment/ Plan :     1. Hypertensive Emergency :   - likely 2/2 non-compliance with medication   -   64 year old Hx of Afib, HTN, HLD, GERD, ZEYAD, CKD surgical hx significant for b/l inguinal hernia repair, Left knee and left shoulder surgery came to the hospital for elective EGD but on arrival pt was found to have Bp of 240/134 being admitted to the hospital for hypertensive emergency.He is non compliant with his  medications as he feels dizzy with medications. As per patient, he can walk 20 blocks without any CP or SOB. Pt has not followed with his Cardiologist since last year. States he had echo done last year but doesnt know the results . Also had Stress test done which he states was normal.   Pt was started on Amlodipine 10mg OD,Losartan 100mg and Hydralazine 25mg Tid. Metoprolol ER 50 mg daily added today  and hydralazine inc to 50 mg tid   Cardiology consulted for Hypertensive Emergency and elevated Troponin    Assessment/ Plan :     1. Hypertensive Emergency :   - likely 2/2 non-compliance with medication   - c/w Losartan 100 mg, Metoprolol ER 50 mg daily, Amlodipine 10 mg OD and Hydralazine 50 mg tid . Goal SBP till pm 180s. Will consider adding chlorthalidone in 1-2 days if SBP still >180  - Encourage medication compliance   - Secondary Htn w/u sent by primary team.  although w/u for hyperaldosteronism will be less reliable as pt is already on ARB   - Needs outpatient f/u with his cardiologist     2. Elevated Troponin:    - ECG noted for ST -T wave changes but likely in the setting of Afib, not a true ACS especially in the absence of symptoms and weakly positive Troponin level   - f/u Echo   - Will need Stress test as outpatient once BP is better controlled     3. Chronic Afib:   - Currently rate controlled   - c/w Metoprolol ER 50 mg daily   - [id number] :1 , will not start on Eliquis at this time, especially when pt is non-compliant. Pt should follow up with his outpatient  cardiologist

## 2020-12-17 ENCOUNTER — RESULT REVIEW (OUTPATIENT)
Age: 65
End: 2020-12-17

## 2020-12-17 ENCOUNTER — TRANSCRIPTION ENCOUNTER (OUTPATIENT)
Age: 65
End: 2020-12-17

## 2020-12-17 ENCOUNTER — NON-APPOINTMENT (OUTPATIENT)
Age: 65
End: 2020-12-17

## 2020-12-17 VITALS
RESPIRATION RATE: 18 BRPM | DIASTOLIC BLOOD PRESSURE: 58 MMHG | HEART RATE: 83 BPM | SYSTOLIC BLOOD PRESSURE: 106 MMHG | OXYGEN SATURATION: 100 % | TEMPERATURE: 98 F

## 2020-12-17 LAB
ALBUMIN SERPL ELPH-MCNC: 3.4 G/DL — LOW (ref 3.5–5)
ALBUMIN SERPL ELPH-MCNC: 4.3 G/DL
ALP BLD-CCNC: 74 U/L
ALP SERPL-CCNC: 62 U/L — SIGNIFICANT CHANGE UP (ref 40–120)
ALT FLD-CCNC: 29 U/L DA — SIGNIFICANT CHANGE UP (ref 10–60)
ALT SERPL-CCNC: 24 U/L
ANION GAP SERPL CALC-SCNC: 10 MMOL/L — SIGNIFICANT CHANGE UP (ref 5–17)
ANION GAP SERPL CALC-SCNC: 12 MMOL/L
APPEARANCE: CLEAR
APTT BLD: 35.6 SEC — HIGH (ref 27.5–35.5)
AST SERPL-CCNC: 27 U/L — SIGNIFICANT CHANGE UP (ref 10–40)
AST SERPL-CCNC: 30 U/L
BACTERIA: NEGATIVE
BASOPHILS # BLD AUTO: 0.08 K/UL
BASOPHILS NFR BLD AUTO: 1 %
BILIRUB SERPL-MCNC: 1.2 MG/DL
BILIRUB SERPL-MCNC: 1.3 MG/DL — HIGH (ref 0.2–1.2)
BILIRUBIN URINE: NEGATIVE
BLOOD URINE: NEGATIVE
BUN SERPL-MCNC: 14 MG/DL — SIGNIFICANT CHANGE UP (ref 7–18)
BUN SERPL-MCNC: 18 MG/DL
CALCIUM SERPL-MCNC: 9.4 MG/DL — SIGNIFICANT CHANGE UP (ref 8.4–10.5)
CALCIUM SERPL-MCNC: 9.6 MG/DL
CHLORIDE SERPL-SCNC: 101 MMOL/L
CHLORIDE SERPL-SCNC: 106 MMOL/L — SIGNIFICANT CHANGE UP (ref 96–108)
CHOLEST SERPL-MCNC: 218 MG/DL
CO2 SERPL-SCNC: 24 MMOL/L — SIGNIFICANT CHANGE UP (ref 22–31)
CO2 SERPL-SCNC: 27 MMOL/L
COLOR: YELLOW
CREAT SERPL-MCNC: 1.16 MG/DL — SIGNIFICANT CHANGE UP (ref 0.5–1.3)
CREAT SERPL-MCNC: 1.65 MG/DL
EOSINOPHIL # BLD AUTO: 0.06 K/UL
EOSINOPHIL NFR BLD AUTO: 0.7 %
GLUCOSE QUALITATIVE U: NEGATIVE
GLUCOSE SERPL-MCNC: 103 MG/DL — HIGH (ref 70–99)
GLUCOSE SERPL-MCNC: 86 MG/DL
HCT VFR BLD CALC: 52.6 % — HIGH (ref 39–50)
HCT VFR BLD CALC: 55.5 %
HDLC SERPL-MCNC: 81 MG/DL
HGB BLD-MCNC: 17.2 G/DL
HGB BLD-MCNC: 17.4 G/DL — HIGH (ref 13–17)
HYALINE CASTS: 3 /LPF
IMM GRANULOCYTES NFR BLD AUTO: 0.2 %
INR BLD: 1.04 RATIO — SIGNIFICANT CHANGE UP (ref 0.88–1.16)
KETONES URINE: NEGATIVE
LDLC SERPL CALC-MCNC: 97 MG/DL
LEUKOCYTE ESTERASE URINE: NEGATIVE
LYMPHOCYTES # BLD AUTO: 1.81 K/UL
LYMPHOCYTES NFR BLD AUTO: 22.4 %
MAGNESIUM SERPL-MCNC: 1.8 MG/DL — SIGNIFICANT CHANGE UP (ref 1.6–2.6)
MAN DIFF?: NORMAL
MCHC RBC-ENTMCNC: 26 PG
MCHC RBC-ENTMCNC: 26.8 PG — LOW (ref 27–34)
MCHC RBC-ENTMCNC: 31 GM/DL
MCHC RBC-ENTMCNC: 33.1 GM/DL — SIGNIFICANT CHANGE UP (ref 32–36)
MCV RBC AUTO: 81 FL — SIGNIFICANT CHANGE UP (ref 80–100)
MCV RBC AUTO: 84 FL
MICROSCOPIC-UA: NORMAL
MONOCYTES # BLD AUTO: 0.96 K/UL
MONOCYTES NFR BLD AUTO: 11.9 %
NEUTROPHILS # BLD AUTO: 5.14 K/UL
NEUTROPHILS NFR BLD AUTO: 63.8 %
NITRITE URINE: NEGATIVE
NONHDLC SERPL-MCNC: 137 MG/DL
NRBC # BLD: 0 /100 WBCS — SIGNIFICANT CHANGE UP (ref 0–0)
PH URINE: 6
PHOSPHATE SERPL-MCNC: 3 MG/DL — SIGNIFICANT CHANGE UP (ref 2.5–4.5)
PLATELET # BLD AUTO: 191 K/UL — SIGNIFICANT CHANGE UP (ref 150–400)
PLATELET # BLD AUTO: 203 K/UL
POTASSIUM SERPL-MCNC: 3.8 MMOL/L — SIGNIFICANT CHANGE UP (ref 3.5–5.3)
POTASSIUM SERPL-SCNC: 3.8 MMOL/L
POTASSIUM SERPL-SCNC: 3.8 MMOL/L — SIGNIFICANT CHANGE UP (ref 3.5–5.3)
PROT SERPL-MCNC: 6.9 G/DL
PROT SERPL-MCNC: 7.5 G/DL — SIGNIFICANT CHANGE UP (ref 6–8.3)
PROTEIN URINE: ABNORMAL
PROTHROM AB SERPL-ACNC: 12.3 SEC — SIGNIFICANT CHANGE UP (ref 10.6–13.6)
RBC # BLD: 6.49 M/UL — HIGH (ref 4.2–5.8)
RBC # BLD: 6.61 M/UL
RBC # FLD: 16.2 % — HIGH (ref 10.3–14.5)
RBC # FLD: 17.1 %
RED BLOOD CELLS URINE: 2 /HPF
SARS-COV-2 IGG SERPL QL IA: NEGATIVE — SIGNIFICANT CHANGE UP
SARS-COV-2 IGM SERPL IA-ACNC: <0.1 INDEX — SIGNIFICANT CHANGE UP
SODIUM SERPL-SCNC: 140 MMOL/L
SODIUM SERPL-SCNC: 140 MMOL/L — SIGNIFICANT CHANGE UP (ref 135–145)
SPECIFIC GRAVITY URINE: 1.03
SQUAMOUS EPITHELIAL CELLS: 1 /HPF
TRIGL SERPL-MCNC: 201 MG/DL
UROBILINOGEN URINE: ABNORMAL
WBC # BLD: 9.64 K/UL — SIGNIFICANT CHANGE UP (ref 3.8–10.5)
WBC # FLD AUTO: 8.07 K/UL
WBC # FLD AUTO: 9.64 K/UL — SIGNIFICANT CHANGE UP (ref 3.8–10.5)
WHITE BLOOD CELLS URINE: 2 /HPF

## 2020-12-17 PROCEDURE — 83036 HEMOGLOBIN GLYCOSYLATED A1C: CPT

## 2020-12-17 PROCEDURE — 88312 SPECIAL STAINS GROUP 1: CPT | Mod: 26

## 2020-12-17 PROCEDURE — 99239 HOSP IP/OBS DSCHRG MGMT >30: CPT

## 2020-12-17 PROCEDURE — 88305 TISSUE EXAM BY PATHOLOGIST: CPT | Mod: 26

## 2020-12-17 PROCEDURE — 84100 ASSAY OF PHOSPHORUS: CPT

## 2020-12-17 PROCEDURE — 82550 ASSAY OF CK (CPK): CPT

## 2020-12-17 PROCEDURE — 85610 PROTHROMBIN TIME: CPT

## 2020-12-17 PROCEDURE — 85027 COMPLETE CBC AUTOMATED: CPT

## 2020-12-17 PROCEDURE — 82553 CREATINE MB FRACTION: CPT

## 2020-12-17 PROCEDURE — 80061 LIPID PANEL: CPT

## 2020-12-17 PROCEDURE — 82088 ASSAY OF ALDOSTERONE: CPT

## 2020-12-17 PROCEDURE — 83835 ASSAY OF METANEPHRINES: CPT

## 2020-12-17 PROCEDURE — 84244 ASSAY OF RENIN: CPT

## 2020-12-17 PROCEDURE — 88312 SPECIAL STAINS GROUP 1: CPT

## 2020-12-17 PROCEDURE — 83735 ASSAY OF MAGNESIUM: CPT

## 2020-12-17 PROCEDURE — 36415 COLL VENOUS BLD VENIPUNCTURE: CPT

## 2020-12-17 PROCEDURE — 93306 TTE W/DOPPLER COMPLETE: CPT

## 2020-12-17 PROCEDURE — 85730 THROMBOPLASTIN TIME PARTIAL: CPT

## 2020-12-17 PROCEDURE — 43239 EGD BIOPSY SINGLE/MULTIPLE: CPT

## 2020-12-17 PROCEDURE — 82533 TOTAL CORTISOL: CPT

## 2020-12-17 PROCEDURE — 80053 COMPREHEN METABOLIC PANEL: CPT

## 2020-12-17 PROCEDURE — 86769 SARS-COV-2 COVID-19 ANTIBODY: CPT

## 2020-12-17 PROCEDURE — 86803 HEPATITIS C AB TEST: CPT

## 2020-12-17 PROCEDURE — 84484 ASSAY OF TROPONIN QUANT: CPT

## 2020-12-17 PROCEDURE — 88305 TISSUE EXAM BY PATHOLOGIST: CPT

## 2020-12-17 RX ORDER — PANTOPRAZOLE SODIUM 20 MG/1
1 TABLET, DELAYED RELEASE ORAL
Qty: 90 | Refills: 0
Start: 2020-12-17 | End: 2021-03-16

## 2020-12-17 RX ORDER — HYDRALAZINE HCL 50 MG
1 TABLET ORAL
Qty: 90 | Refills: 0
Start: 2020-12-17 | End: 2021-01-15

## 2020-12-17 RX ORDER — HYDRALAZINE HCL 50 MG
1 TABLET ORAL
Qty: 0 | Refills: 0 | DISCHARGE

## 2020-12-17 RX ORDER — ASPIRIN/CALCIUM CARB/MAGNESIUM 324 MG
1 TABLET ORAL
Qty: 30 | Refills: 0
Start: 2020-12-17 | End: 2021-01-15

## 2020-12-17 RX ORDER — APIXABAN 2.5 MG/1
1 TABLET, FILM COATED ORAL
Qty: 0 | Refills: 0 | DISCHARGE

## 2020-12-17 RX ADMIN — LOSARTAN POTASSIUM 100 MILLIGRAM(S): 100 TABLET, FILM COATED ORAL at 17:04

## 2020-12-17 RX ADMIN — Medication 50 MILLIGRAM(S): at 05:46

## 2020-12-17 RX ADMIN — AMLODIPINE BESYLATE 10 MILLIGRAM(S): 2.5 TABLET ORAL at 05:46

## 2020-12-17 RX ADMIN — Medication 5 MILLIGRAM(S): at 06:07

## 2020-12-17 RX ADMIN — Medication 650 MILLIGRAM(S): at 08:25

## 2020-12-17 RX ADMIN — Medication 81 MILLIGRAM(S): at 15:25

## 2020-12-17 RX ADMIN — Medication 5 MILLIGRAM(S): at 00:23

## 2020-12-17 RX ADMIN — Medication 50 MILLIGRAM(S): at 15:24

## 2020-12-17 RX ADMIN — Medication 650 MILLIGRAM(S): at 09:05

## 2020-12-17 RX ADMIN — PANTOPRAZOLE SODIUM 40 MILLIGRAM(S): 20 TABLET, DELAYED RELEASE ORAL at 06:08

## 2020-12-17 NOTE — DISCHARGE NOTE PROVIDER - HOSPITAL COURSE
Patient is 64 year old Hx of Afib, HTN, HLD, GERD, ZEYAD, CKD surgical hx significant for b/l inguinal hernia repair, Left knee and left shoulder surgery came to the hospital for elective EGD but on arrival pt was found to have Bp of 240/134 being admitted to the hospital for hypertensive emergency. Pt is a non compliant patient . His home meds include losartan 100mg , Amlodipine 10mg OD and Hydralazine 25mg BID. During this admission we changed his regimen. He is now Losartan 100mg OD, Hydralazine 50mg TID, Amlodipine 10mg OD and metoprolol 25 mg OD which suboptimally controll his BP and now it is lower than 170/100. He has Afib , rate controlled, with ANGELA VASC score 1 , he is non complaint to Eliquis as well. Ti was not resumed on this admission because  of his uncontrolled HTN and  . Advised the patient to Patient is 64 year old Hx of Afib, HTN, HLD, GERD, ZEYAD, CKD surgical hx significant for b/l inguinal hernia repair, Left knee and left shoulder surgery came to the hospital for elective EGD but on arrival pt was found to have Bp of 240/134 being admitted to the hospital for hypertensive emergency. Pt is a non compliant patient . His home meds include losartan 100mg , Amlodipine 10mg OD and Hydralazine 25mg BID. Cardio consulted recommended to perform ECHO and thiazide diuretic if BP doesn't get controlled. ECHO showed Mild mitral regurgitation, Moderately dilated left atrium, Moderate concentric left ventricular hypertrophy, normal EF.During this admission we changed his regimen. He is now Losartan 100mg OD, Hydralazine 50mg TID, Amlodipine 10mg OD and metoprolol 25 mg OD which suboptimally controlled his BP and now it is lower than 170/100.   He has Afib , rate controlled, with ANGELA VASC score 1 , he is non complaint to Eliquis as well. It was not resumed on this admission because of his uncontrolled HTN, non compliant, and CHADs Vasc score 1. Advised the patient to follow up his cardiologist Dr Romero for further management of his medications and considering starting of his Eliquis. EGD performed on 12/17 showed gastritis . GI recommended Pantoprazole 40mg OD for 3 months. Pt also has baseline creatinine 1.2 but it was stable throughout the stay.    Pt BP has been suboptimally controlled. For further management he will be going to see his cardiologist Dr Romero outpatient. Pt is medically stable to be discharged to home. Patient is 64 year old Hx of Afib, HTN, HLD, GERD, ZEYAD, CKD surgical hx significant for b/l inguinal hernia repair, Left knee and left shoulder surgery came to the hospital for elective EGD but on arrival pt was found to have Bp of 240/134 being admitted to the hospital for hypertensive emergency. Pt is a non compliant patient . His home meds include losartan 100mg , Amlodipine 10mg OD and Hydralazine 25mg BID. Cardio consulted recommended to perform ECHO and thiazide diuretic if BP doesn't get controlled. ECHO showed Mild mitral regurgitation, Moderately dilated left atrium, Moderate concentric left ventricular hypertrophy, normal EF.During this admission we changed his regimen. He is now Losartan 100mg OD, Hydralazine 50mg TID, Amlodipine 10mg OD and metoprolol 25 mg OD which suboptimally controlled his BP and now it is lower than 170/100.   He has Afib , rate controlled, with ANGELA VASC score 1 , he is non complaint to Eliquis as well. It was not resumed on this admission because of his uncontrolled HTN, non compliant, and CHADs Vasc score 1. Advised the patient to follow up his cardiologist Dr Romero for further management of his medications and considering starting of his Eliquis. EGD performed on 12/17 showed gastritis . GI recommended Pantoprazole 40mg OD for 3 months. He will follow with Dr Portillo for biopsy result. Pt also has baseline creatinine 1.2 but it was stable throughout the stay.    Pt BP has been suboptimally controlled. For further management he will be going to see his cardiologist Dr Romero outpatient. Pt is medically stable to be discharged to home. Patient is 65 year old Hx of Afib, HTN, HLD, GERD, ZEYAD, CKD surgical hx significant for b/l inguinal hernia repair, Left knee and left shoulder surgery came to the hospital for elective EGD but on arrival pt was found to have Bp of 240/134 being admitted to the hospital for hypertensive emergency. Pt is a non compliant patient . His home meds include losartan 100mg , Amlodipine 10mg OD and Hydralazine 25mg BID. Cardio consulted recommended to perform ECHO and thiazide diuretic if BP doesn't get controlled. ECHO showed Mild mitral regurgitation, Moderately dilated left atrium, Moderate concentric left ventricular hypertrophy, normal EF.During this admission we changed his regimen. He is now Losartan 100mg OD, Hydralazine 50mg TID, Amlodipine 10mg OD and metoprolol 25 mg OD which suboptimally controlled his BP and now it is lower than 170/100.   He has Afib , rate controlled, with ANGELA VASC score 1 , he is non complaint to Eliquis as well. It was not resumed on this admission because of his uncontrolled HTN, non compliant, and CHADs Vasc score 1. Advised the patient to follow up his cardiologist Dr Romero for further management of his medications and considering starting of his Eliquis. EGD performed on 12/17 showed gastritis . GI recommended Pantoprazole 40mg OD for 3 months. He will follow with Dr Portillo for biopsy result. Pt also has baseline creatinine 1.2 but it was stable throughout the stay.    Pt BP has been suboptimally controlled. For further management he will be going to see his cardiologist Dr Romero outpatient. Pt is medically stable to be discharged to home.

## 2020-12-17 NOTE — PROGRESS NOTE ADULT - SUBJECTIVE AND OBJECTIVE BOX
The patient is a 64-year-old -American male with past medical history significant for atrial fibrillation on Eliquis, ?BPH, hypertension and hypercholesterolemia disease who was admitted to [facility] on December 15, 2020 for uncontrolled hypertension.  The patient was scheduled for an upper endoscopy on December 15, 2020 to assess for abdominal pain, dyspepsia, gastroesophageal reflux disease and weight loss. During the initial evaluation in the pre-op area, the patient was found to have uncontrolled hypertension.  The upper endoscopy was postponed.  A medical consultation was obtained to assess the patient for the uncontrolled hypertension.   The patient was hospitalized for blood pressure control.  The patient states he is feeling better.  The blood pressure has improved but remains elevated. He admits to having occasional gastroesophageal reflux disease last night that resolved. The patient denies any chest pain, shortness of breath, palpitations, dizziness, fever, chills, abdominal pain, nausea, vomiting, diarrhea, constipation, bright red blood per rectum, melena or hematemesis. The patient previously complained of weight loss but denies any anorexia. The patient admitted to losing 5 to 6 pounds over the past 8 to 9 months. The blood work performed on December 17, 2020 revealed an elevated hemoglobin/hematocrit level of 17.4/52.6, respectively.   The prior blood work performed on December 16, 2020 revealed an elevated PTT of 35.6 seconds, an elevated hemoglobin A 1-c of 6.2%, a normalized potassium level of 3.5 mmol/L, and elevated blood glucose of 131 mg/dL, a low albumin of 3.3 g/dl, a low GFR of 59 ml/min/1.73 M2 and an elevated troponin level of 0.099 ng/ml. The patient is being followed by cardiology, Dr. Checo Orta.  The note was read and appreciated. The echocardiogram performed on December 16, 2020 revealed mild mitral regurgitation, moderately dilated left atrium with LA volume index of 42 cc/m2, moderate concentric left ventricular hypertrophy, a normal left ventricular systolic function and normal right ventricular size and function.   The patient admits to having a prior colonoscopy performed by another gastroenterologist, Dr. Beltre approximately 7 years ago. According to the patient, the colonoscopic findings was unknown to the patient. The patient denies any significant family history of GI problems.     PMH: atrial fibrillation on Eliquis, ?BPH, hypertension and hypercholesterolemia disease  PSH: knee arthroscopy, shoulder surgery, hernia repair  Allergies: NKDA  Social Hx: (+) prior smoking, (+) prior ETOH, (-) IVDA  Family Hx. Noncontributory  MEDICATIONS  (STANDING):  amLODIPine   Tablet 10 milliGRAM(s) Oral daily  aspirin enteric coated 81 milliGRAM(s) Oral daily  atorvastatin 40 milliGRAM(s) Oral at bedtime  hydrALAZINE 50 milliGRAM(s) Oral every 8 hours  influenza   Vaccine 0.5 milliLiter(s) IntraMuscular once  losartan 100 milliGRAM(s) Oral every 24 hours  metoprolol succinate ER 50 milliGRAM(s) Oral daily  pantoprazole    Tablet 40 milliGRAM(s) Oral before breakfast    MEDICATIONS  (PRN):  acetaminophen   Tablet .. 650 milliGRAM(s) Oral every 6 hours PRN Mild Pain (1 - 3)  hydrALAZINE Injectable 5 milliGRAM(s) IV Push every 6 hours PRN if SBP >190    Vital Signs Last 24 Hrs  T(C): 36.9 (17 Dec 2020 05:11), Max: 37 (16 Dec 2020 23:56)  T(F): 98.5 (17 Dec 2020 05:11), Max: 98.6 (16 Dec 2020 23:56)  HR: 69 (17 Dec 2020 06:34) (59 - 93)  BP: 175/123 (17 Dec 2020 06:34) (168/94 - 190/93)  BP(mean): --  RR: 17 (17 Dec 2020 05:11) (17 - 18)  SpO2: 96% (17 Dec 2020 05:11) (96% - 98%)    Physical Examination:   General: well developed, overweight  male in no acute distress  Eyes: No icteric sclera, conjunctiva clear.  Ears/Nose/Throat: Oropharynx not erythematous, no enlarged tonsils, nose not congested, ears unremarkable  Head: normal cephalic, nontraumatic  Neck: Neck supple, no masses, thyroid not palpable, no JVD  Chest: normal appearance, normal symmetrical,  Respiratory: Coarse breath sounds bilaterally, no wheezing no rales or rhonchi   Cardiovascular: S1-S2 audible, no murmur, no rubs or gallops, irregular rhythm  Abdomen: (+) BS, soft, nontender, no rebound or guarding or masses noted. No hepatosplenomegaly  Back: (-) CVAT, no spinal tenderness  Rectal: previously performed  revealed guaiac (-) no masses  Extremities: full range of motion in all extremities, (-) c, (-) c, (-) e  Musculoskeletal: Good motor strength, good range of motion, normal appearing lower extremities  Neuro: A+O x 3, no ataxia,  Psych: good affect, not depressed, no anxiety  Skin: normal, no jaundice  CBC Full  -  ( 17 Dec 2020 06:25 )  WBC Count : 9.64 K/uL  RBC Count : 6.49 M/uL  Hemoglobin : 17.4 g/dL  Hematocrit : 52.6 %  Platelet Count - Automated : 191 K/uL  Mean Cell Volume : 81.0 fl  Mean Cell Hemoglobin : 26.8 pg  Mean Cell Hemoglobin Concentration : 33.1 gm/dL  Auto Neutrophil # : x  Auto Lymphocyte # : x  Auto Monocyte # : x  Auto Eosinophil # : x  Auto Basophil # : x  Auto Neutrophil % : x  Auto Lymphocyte % : x  Auto Monocyte % : x  Auto Eosinophil % : x  Auto Basophil % : x  Phosphorus Level, Serum in AM (12.17.20 @ 06:25)   Phosphorus Level, Serum: 3.0 mg/dL   Magnesium, Serum in AM (12.17.20 @ 06:25)   Magnesium, Serum: 1.8 mg/dL   Activated Partial Thromboplastin Time in AM (12.17.20 @ 06:25)   Activated Partial Thromboplastin Time: 35.6 sec   Prothrombin Time and INR, Plasma in AM (12.17.20 @ 06:25)   Prothrombin Time, Plasma: 12.3 sec   INR: 1.04: Recommended ranges for therapeutic INR:   Comprehensive Metabolic Panel in AM (12.17.20 @ 06:25)   Sodium, Serum: 140 mmol/L   Potassium, Serum: 3.8 mmol/L   Chloride, Serum: 106 mmol/L   Carbon Dioxide, Serum: 24 mmol/L   Anion Gap, Serum: 10 mmol/L   Blood Urea Nitrogen, Serum: 14 mg/dL   Creatinine, Serum: 1.16 mg/dL   Glucose, Serum: 103 mg/dL   Calcium, Total Serum: 9.4 mg/dL   Protein Total, Serum: 7.5 g/dL   Albumin, Serum: 3.4 g/dL   Bilirubin Total, Serum: 1.3 mg/dL   Alkaline Phosphatase, Serum: 62 U/L   Aspartate Aminotransferase (AST/SGOT): 27 U/L   Alanine Aminotransferase (ALT/SGPT): 29 U/L DA   eGFR if Non : 67: Interpretative comment   Cortisol AM, Serum (12.16.20 @ 13:53)   Cortisol AM, Serum: 12.9 ug/dL   A1C with Estimated Average Glucose in AM (12.16.20 @ 09:22)   A1C with Estimated Average Glucose Result: 6.2: Method: Immunoassay   Hepatitis C Antibody Test (12.16.20 @ 09:08)   Hepatitis C Virus S/CO Ratio: 0.13 S/CO   Hepatitis C Virus Interpretation: Nonreact: Hepatitis C AB   Aldosterone, Serum (12.16.20 @ 09:03)   Aldosterone, Serum: 13.5: Values flagged as "Out of Range" require correlation with information   below to determine if clinically abnormal.   Troponin I, Serum (12.16.20 @ 07:16)   Troponin I, Serum: 0.099: The new reference range for Troponin-I performed on the Siemens [place]

## 2020-12-17 NOTE — CHART NOTE - NSCHARTNOTEFT_GEN_A_CORE
Esophagogastroduodenoscopy Report:    Indication:             Abdominal pain, dyspepsia, GERD, weight loss  Referring MD:       Dr. Carlos, Dr. Lares, Dr. Bess  Instrument:  #        [id number]  Anesthesia:            MAC    Consent:  Informed consent was obtained from the patient after providing any opportunity for questions  Procedure: The gastroscope was gently passed through the incisoral orifice into the oral cavity and under direct visualization the esophagus was intubated. The endoscope was passed down the esophagus, through the stomach and into proximal jejunum. Color, texture, mucosa and anatomy of the esophagus, stomach, and duodenum were carefully examined with the scope. The patient tolerated the procedure well. After completion of the examination, the patient was transferred to the recovery room.     Procedure: Upper endoscopy and biopsies    Preparation: NPO     Findings:     Oropharynx:	   Normal appearing oropharynx.  Esophagus:	   Esophagus with mild distal erythema but no ulcers or erosions noted. Biopsy taken.  EG-junction:	   Normal appearing E-G junction at 48 cm. No hiatal hernia noted. No esophageal varices, ulcers, erosions or erythema noted.  Cardia:	                 Severe diffuse erythema suggestive of gastritis but no gastric varices, ulcers or erosions noted.  (+) Clear liquid suctioned.  Body:	                 Severe erythema suggestive of gastritis versus portal gastropathy with multiple gastric polyps but no ulcers or erosions noted. Biopsy taken.   Antrum:	                 Severe erythema suggestive of gastritis versus portal gastropathy with multiple gastric polyps but no ulcers or erosions noted. Biopsy taken.   Pylorus:	                 Normal appearing pylorus and pyloric channel with no ulcers, erosions or erythema noted.     Duodenal Bulb:         Normal appearing duodenal mucosa with no ulcers, erosions or erythema noted. Biopsy taken.  2nd portion:	   Normal appearing duodenal mucosa with no ulcers, erosions or erythema noted.  Biopsy taken.  3rd portion:	   Not visualized.      EBL:0    Impression: 1. Severe diffuse gastritis versus portal gastropathy 2. Multiple gastric polyps in the body and antrum of the stomach    Plan:    1. Avoid late-night meals and dietary indiscretions.  2. Avoid fried and fatty foods.  3. Avoid nonsteroidal anti-inflammatory drugs and aspirin.  4. Will check path results.  5. Recommend a trial of pantoprazole 40 mg once a day for 3 months.  6. Followup in office in 4 weeks to reassess the symptoms and discuss the findings after discharge.       Procedure Start Time:  1:33 pm  Procedure End Time:    1:41 pm      Attending:       Deric Portillo M.D.

## 2020-12-17 NOTE — PROGRESS NOTE ADULT - PROBLEM SELECTOR PLAN 2
-ChadsVasc of 1, was noncompliant with Eliquis  -Started on metoprolol 25mg OD, if pt would be compliant can consider adding Eliquis in near future  -Tele events: afib , rate controlled
-ChadsVasc of 1, was noncompliant with Eliquis  -Started on metoprolol 25mg OD, if pt would be compliant can consider adding Eliquis in near future  -Tele events: afib , rate controlled

## 2020-12-17 NOTE — PROGRESS NOTE ADULT - PROBLEM SELECTOR PLAN 1
-Pt came for EGD by Dr. Portillo, found to be hypertensive, admitted on floor   - On admission /134  - Got 2 10mg IV labetalol, 100mg losartan, [id number] mg OD, hydrazine 25 mg BID  - This morning hydralazine has been increased to 50mg TID, metoprolol 25 mg OD added  - Goal of SBP <180, and pt can go for EGD tomorrow as well if SBP continues to be <180  -Pt is very non compliant, had seen cardiologist 1 yr ago, had stress test as well   -ECHO done today, results pending   -Trops trend down possible demand ischemia   - Dr. Evans is on board
-Pt came for EGD by Dr. Portillo, found to be hypertensive, admitted on floor   - On admission /134  - Got 2 10mg IV labetalol, 100mg losartan, [id number] mg OD, hydrazine 25 mg BID  - This morning hydralazine has been increased to 50mg TID, metoprolol 25 mg OD added  - Goal of SBP <180, and pt can go for EGD tomorrow as well if SBP continues to be <180  -Pt is very non compliant, had seen cardiologist 1 yr ago, had stress test as well   -ECHO done today, results pending   -Trops trend down possible demand ischemia   - Dr. Evans is on board

## 2020-12-17 NOTE — DISCHARGE NOTE PROVIDER - NSDCMRMEDTOKEN_GEN_ALL_CORE_FT
amLODIPine 10 mg oral tablet: 1 tab(s) orally once a day  Eliquis 5 mg oral tablet: 1 tab(s) orally 2 times a day  gabapentin 300 mg oral capsule: 1 cap(s) orally once a day (at bedtime)  hydrALAZINE 25 mg oral tablet: 1 tab(s) orally 2 times a day  Lipitor 20 mg oral tablet: 1 tab(s) orally once a day  losartan 100 mg oral tablet: 1 tab(s) orally once a day  metoprolol succinate 50 mg oral tablet, extended release: 1 tab(s) orally once a day   amLODIPine 10 mg oral tablet: 1 tab(s) orally once a day  aspirin 81 mg oral delayed release tablet: 1 tab(s) orally once a day  gabapentin 300 mg oral capsule: 1 cap(s) orally once a day (at bedtime)  hydrALAZINE 50 mg oral tablet: 1 tab(s) orally 3 times a day   Lipitor 20 mg oral tablet: 1 tab(s) orally once a day  losartan 100 mg oral tablet: 1 tab(s) orally once a day  metoprolol succinate 50 mg oral tablet, extended release: 1 tab(s) orally once a day  pantoprazole 40 mg oral delayed release tablet: 1 tab(s) orally once a day (before a meal)

## 2020-12-17 NOTE — PROGRESS NOTE ADULT - PROBLEM SELECTOR PLAN 7
IMPROVE VTE score: 3  Will manage with: Heparin S/C    [ ] Previous VTE                                    3  [ ] Thrombophilia                                  2  [ ] Lower limb paralysis                        2  (unable to hold up >15 seconds)    [ ] Current Cancer (within 6 months)        2   [x] Immobilization > 24 hrs                    1  [ ] ICU/CCU stay > 24 hrs                      1  [x] Age > 60                                         1
IMPROVE VTE score: 3  Will manage with: Heparin S/C    [ ] Previous VTE                                    3  [ ] Thrombophilia                                  2  [ ] Lower limb paralysis                        2  (unable to hold up >15 seconds)    [ ] Current Cancer (within 6 months)        2   [x] Immobilization > 24 hrs                    1  [ ] ICU/CCU stay > 24 hrs                      1  [x] Age > 60                                         1

## 2020-12-17 NOTE — DISCHARGE NOTE PROVIDER - NSDCFUSCHEDAPPT_GEN_ALL_CORE_FT
MARIA EUGENIA HERNANDEZ ; 01/06/2021 ; [facility] [address] MARIA EUGENIA HERNANDEZ ; 01/21/2021 ; [facility] [address] MARIA EUGENIA Mccabe ; 02/16/2021 ; [facility] [address] [facility]

## 2020-12-17 NOTE — DISCHARGE NOTE PROVIDER - NSDCCPCAREPLAN_GEN_ALL_CORE_FT
PRINCIPAL DISCHARGE DIAGNOSIS  Diagnosis: Hypertension  Assessment and Plan of Treatment: You admitted to the hospital for hypertensive emergency. You endorsed that you donot take medications regularly. You were explained major risks of uncontrolled BP such as stroke , kidney failure and heart failure . Your home meds include losartan 100mg , Amlodipine 10mg OD and Hydralazine 25mg BID. Cardio consulted recommended to perform ECHO. ECHO showed Moderate concentric left ventricular hypertrophy (due to uncontrol BP) . During this admission we changed your BP medications regimen. Your nwe regimen is now Losartan 100mg OD, Hydralazine 50mg TID, Amlodipine 10mg OD and metoprolol 25 mg OD which suboptimally controlled not well copntrolled you BP. You have been highly advised to follow up with your cardiologist Dr Romero after discharge within a week for the further managmenet of your BP.   He has Afib , rate controlled, with ANGEAL VASC score 1 , he is non complaint to Eliquis as well. It was not resumed on this admission because of his uncontrolled HTN, non compliant, and CHADs Vasc score 1. Advised the patient to follow up his cardiologist Dr Romero for further management of his medications and considering starting of his Eliquis. EGD performed on 12/17 showed gastritis . GI recommended Pantoprazole 40mg OD for 3 months.      SECONDARY DISCHARGE DIAGNOSES  Diagnosis: Afib  Assessment and Plan of Treatment: You have  Afib ,irregular heart  rate controlled. You donot take your eliquis as well. Metoprolol is the one who control increase heart rate in Afib and Eliquis prevent clot formation due to irregular heart rate. We did not resumed eliquis on this admission because of your uncontrolled HTN, non compliant, and CHADs Vasc score 1. We dvised you to follow up your cardiologist Dr Romero for further management of your medications and considering starting of your Eliquis.    Diagnosis: Gastritis  Assessment and Plan of Treatment: EGD performed on 12/17 showed gastritis . GI recommended Pantoprazole 40mg once a day for 3 months. Follow up with Dr Portillo as needed.    Diagnosis: Hyperlipemia  Assessment and Plan of Treatment: Continue to take your home medications. Avoid fatty foods and do exercises regularly.    Diagnosis: Kidney function test abnormal  Assessment and Plan of Treatment: Your creatinine is little bit high from the normal range. Continue to visit your PCP for further follow ups and keep an eye on your creatinine levels and kidney function     PRINCIPAL DISCHARGE DIAGNOSIS  Diagnosis: Hypertension  Assessment and Plan of Treatment: You admitted to the hospital for hypertensive emergency. You endorsed that you donot take medications regularly. You were explained major risks of uncontrolled BP such as stroke , kidney failure and heart failure . Your home meds include losartan 100mg , Amlodipine 10mg OD and Hydralazine 25mg BID. Cardio consulted recommended to perform ECHO. ECHO showed Moderate concentric left ventricular hypertrophy (due to uncontrol BP) . During this admission we changed your BP medications regimen. Your nwe regimen is now Losartan 100mg OD, Hydralazine 50mg TID, Amlodipine 10mg OD and metoprolol 25 mg OD which suboptimally controlled not well copntrolled you BP. You have been highly advised to follow up with your cardiologist Dr Romero after discharge within a week for the further managmenet of your BP.         SECONDARY DISCHARGE DIAGNOSES  Diagnosis: Afib  Assessment and Plan of Treatment: You have  Afib ,irregular heart  rate controlled. You donot take your eliquis as well. Metoprolol is the one who control increase heart rate in Afib and Eliquis prevent clot formation due to irregular heart rate. We did not resumed eliquis on this admission because of your uncontrolled HTN, non compliant, and CHADs Vasc score 1. We dvised you to follow up your cardiologist Dr Romero for further management of your medications and considering starting of your Eliquis.    Diagnosis: Gastritis  Assessment and Plan of Treatment: EGD performed on 12/17 showed gastritis . GI recommended Pantoprazole 40mg once a day for 3 months. Follow up with Dr Portillo as needed.    Diagnosis: Hyperlipemia  Assessment and Plan of Treatment: Continue to take your home medications. Avoid fatty foods and do exercises regularly.    Diagnosis: Kidney function test abnormal  Assessment and Plan of Treatment: Your creatinine is little bit high from the normal range. Continue to visit your PCP for further follow ups and keep an eye on your creatinine levels and kidney function     PRINCIPAL DISCHARGE DIAGNOSIS  Diagnosis: Hypertension  Assessment and Plan of Treatment: You admitted to the hospital for hypertensive emergency. You endorsed that you donot take medications regularly. You were explained major risks of uncontrolled BP such as stroke , kidney failure and heart failure . Your home meds include losartan 100mg , Amlodipine 10mg OD and Hydralazine 25mg BID. Cardio consulted recommended to perform ECHO. ECHO showed Moderate concentric left ventricular hypertrophy (due to uncontrol BP) . During this admission we changed your BP medications regimen. Your nwe regimen is now Losartan 100mg OD, Hydralazine 50mg TID, Amlodipine 10mg OD and metoprolol 25 mg OD which suboptimally controlled not well copntrolled you BP. You have been highly advised to follow up with your cardiologist Dr Romero after discharge within a week for the further managmenet of your BP.   Maintain a healthy diet that consist of low sugar, low fat, low sodium diet. Exercise frequently if possible.  Follow up with primary care physician in one week after discharge.        SECONDARY DISCHARGE DIAGNOSES  Diagnosis: Afib  Assessment and Plan of Treatment: You have  Afib ,irregular heart  rate controlled. You donot take your eliquis as well. Metoprolol is the one who control increase heart rate in Afib and Eliquis prevent clot formation due to irregular heart rate. We did not resumed eliquis on this admission because of your uncontrolled HTN, non compliant, and CHADs Vasc score 1. We dvised you to follow up your cardiologist Dr Romero for further management of your medications and considering starting of your Eliquis.    Diagnosis: Gastritis  Assessment and Plan of Treatment: EGD performed on 12/17 showed gastritis . GI recommended Pantoprazole 40mg once a day for 3 months. Follow up with Dr Portillo for your biopsy result.    Diagnosis: Hyperlipemia  Assessment and Plan of Treatment: Continue to take your home medications. Avoid fatty foods and do exercises regularly.    Diagnosis: Kidney function test abnormal  Assessment and Plan of Treatment: Your creatinine is little bit high from the normal range. Continue to visit your PCP for further follow ups and keep an eye on your creatinine levels and kidney function     PRINCIPAL DISCHARGE DIAGNOSIS  Diagnosis: Hypertension  Assessment and Plan of Treatment: You admitted to the hospital for hypertensive emergency. You endorsed that you donot take medications regularly. You were explained major risks of uncontrolled BP such as stroke , kidney failure and heart failure . Your home meds include losartan 100mg , Amlodipine 10mg OD and Hydralazine 25mg BID. Cardio consulted recommended to perform ECHO. ECHO showed Moderate concentric left ventricular hypertrophy (due to uncontrol BP) . During this admission we changed your BP medications regimen. Your nwe regimen is now Losartan 100mg OD, Hydralazine 50mg TID, Amlodipine 10mg OD and metoprolol 25 mg OD which suboptimally controlled not well copntrolled you BP. You have been highly advised to follow up with your cardiologist Dr Romero after discharge within a week for the further managmenet of your BP.   Maintain a healthy diet that consist of low sugar, low fat, low sodium diet. Exercise frequently if possible.  Follow up with primary care physician in one week after discharge.        SECONDARY DISCHARGE DIAGNOSES  Diagnosis: Kidney function test abnormal  Assessment and Plan of Treatment: Your creatinine is little bit high from the normal range. Continue to visit your PCP for further follow ups and keep an eye on your creatinine levels and kidney function    Diagnosis: Gastritis  Assessment and Plan of Treatment: EGD performed on 12/17 showed gastritis . GI recommended Pantoprazole 40mg once a day for 3 months. Follow up with Dr Portillo in 1 month for your biopsy result.    Diagnosis: Afib  Assessment and Plan of Treatment: You have  Afib ,irregular heart  rate controlled. You donot take your eliquis as well. Metoprolol is the one who control increase heart rate in Afib and Eliquis prevent clot formation due to irregular heart rate. We did not resumed eliquis on this admission because of your uncontrolled HTN, non compliant, and CHADs Vasc score 1. We dvised you to follow up your cardiologist Dr Romero for further management of your medications and considering starting of your Eliquis.    Diagnosis: Hyperlipemia  Assessment and Plan of Treatment: Continue to take your home medications. Avoid fatty foods and do exercises regularly.

## 2020-12-17 NOTE — DISCHARGE NOTE PROVIDER - CARE PROVIDERS DIRECT ADDRESSES
[email or web address]

## 2020-12-17 NOTE — PROGRESS NOTE ADULT - ASSESSMENT
A/P: Uncontrolled hypertension, abdominal pain, dyspepsia, GERD, weight loss  The patient is a 64-year-old -American male with past medical history significant for atrial fibrillation on Eliquis, ?BPH, hypertension and hypercholesterolemia disease who was admitted to [facility] [place] on December 15, 2020 for uncontrolled hypertension.  The patient was scheduled for an upper endoscopy on December 15, 2020 to assess for abdominal pain, dyspepsia, gastroesophageal reflux disease and weight loss. During the initial evaluation in the pre-op area, the patient was found to have uncontrolled hypertension.  The upper endoscopy was postponed.  A medical consultation was obtained to assess the patient for the uncontrolled hypertension.   The patient was hospitalized for blood pressure control.   The patient states that he is feeling better with no complaints.  He denies any chest pain, shortness of breath or palpitations. The patient denies any further episodes of abdominal pain, dyspepsia and gastroesophageal reflux disease.  The blood work performed on December 15, 2020 revealed an elevated hemoglobin/hematocrit level of 17.4/33.4, respectively, and elevated PTT of 37.9 seconds, a low potassium level of 3.3 mmol/L, and elevated blood glucose of 10 6 mg/dL and an elevated troponin level of 0.097 ng/ml The patient is to avoid nonsteroidal anti-inflammatory drugs and aspirin. I recommend a trial of Pantoprazole 40 mg once a day for 3 months for the symptoms. I recommend an upper endoscopy to assess the symptoms for peptic ulcer disease versus esophagitis once the blood pressure is better controlled. The patient was told of the risks and benefits of the procedure. The patient was told of the risks of perforation, emergency surgery, bleeding, infections and missed lesions. The patient agreed and will proceed with the procedure once his blood pressure is under control.  The upper endoscopy will be tentatively scheduled for tomorrow, December 17, 2020. The patient is to be n.p.o. after midnight for the procedure. Continue present care.  Will continue to follow the patient closely.  The case was previously discussed with Dr. Lares.  
A/P: Uncontrolled hypertension, abdominal pain, dyspepsia, GERD, weight loss  The patient is a 64-year-old -American male with past medical history significant for atrial fibrillation on Eliquis, ?BPH, hypertension and hypercholesterolemia disease who was admitted to [facility] [place] on December 15, 2020 for uncontrolled hypertension.  The patient was scheduled for an upper endoscopy on December 15, 2020 to assess for abdominal pain, dyspepsia, gastroesophageal reflux disease and weight loss. During the initial evaluation in the pre-op area, the patient was found to have uncontrolled hypertension.  The upper endoscopy was postponed.  A medical consultation was obtained to assess the patient for the uncontrolled hypertension.   The patient was hospitalized for blood pressure control.   The patient states that he is feeling better.  The blood pressure has improved but remains elevated. He denies any chest pain, shortness of breath or palpitations. The patient admits to having occasional gastroesophageal reflux last night but denies any further episodes of abdominal pain or dyspepsia.  The patient is being followed by cardiology, Dr. Checo Orta.  The note was read and appreciated.  The echocardiogram performed on December 16, 2020 revealed mild mitral regurgitation, moderately dilated left atrium with LA volume index of 42 cc/m2, moderate concentric left ventricular hypertrophy, a normal left ventricular systolic function and normal right ventricular size and function.  The blood work performed on December 17, 2020 revealed an elevated hemoglobin/hematocrit level of 17.4/52.6, respectively.   The prior blood work performed on December 16, 2020 revealed an elevated PTT of 35.6 seconds, an elevated hemoglobin A 1-c of 6.2%, a normalized potassium level of 3.5 mmol/L, and elevated blood glucose of 131 mg/dL, a low albumin of 3.3 g/dl, a low GFR of 59 ml/min/1.73 M2 and an elevated troponin level of 0.099 ng/ml. The patient is to avoid nonsteroidal anti-inflammatory drugs and aspirin. I recommend continue on a trial of Pantoprazole 40 mg once a day for 3 months for the symptoms. I recommend proceeding with the upper endoscopy to assess the symptoms for peptic ulcer disease versus esophagitis. The patient was told of the risks and benefits of the procedure. The patient was told of the risks of perforation, emergency surgery, bleeding, infections and missed lesions. The patient agreed and will proceed with the procedure.  The upper endoscopy is tentatively scheduled for today, December 17, 2020. The patient is n.p.o. since midnight for the procedure. Continue present care.  Will continue to follow the patient closely.  The case was previously discussed with Dr. Lares.  
64 year old male found to have hypertensive emergency.   called Dr. Lesley Romero office pt was seen last time in Nov 2019 found to have S. blood pressure was >200, non complaint to medications.   as per Nov 2019 pt was supposed to be on amlodipine 10mg, lipitor 20mng, Eliquis 5mg bid, hydralazine 25 bid, losartan 100mg od, metoprolol 50mg od  tried to called Dr. Acuna but Dr. Acuna is not available     Assessment:     Hypertensive emergency   Afib  HLD  GERD    Plan:   likely from non complaint   s/p labetalol 20mg ivpush  will give vasotec 1.25 and amlodipine 10mg stat   monitor blood pressure closely   goal sbp around 180 over next 24 hours   f/u ecg  f/u echo   f/u trop   will consider secondary htn work if pt is found to have malignant htn after starting bp meds   cardiology consulted Dr. Orta        Afib:   CHADVasc score 1 for htn   will hold AC for now given uncontrolled htn    rate controlled currently     HLD   resume lipitor     GERD:   c/w ppi   Dr. Portillo will plan for EGD on Thursday if bp is stable.    CKD:   Likely from HTN   baseline cr 1.4  monitor Cr   BMP daily     PPX:   HSQ dvt ppx   PPI for gi ppx      
64 year old male found to have hypertensive emergency.   called Dr. Lesley Romero office pt was seen last time in Nov 2019 found to have S. blood pressure was >200, non complaint to medications.   as per Nov 2019 pt was supposed to be on amlodipine 10mg, lipitor 20mng, Eliquis 5mg bid, hydralazine 25 bid, losartan 100mg od, metoprolol 50mg od  tried to called Dr. Acuna but Dr. Acuna is not available     Assessment:     Hypertensive emergency   Afib  HLD  GERD    Plan:   likely from non complaint   s/p labetalol 20mg ivpush  will give vasotec 1.25 and amlodipine 10mg stat   monitor blood pressure closely   goal sbp around 180 over next 24 hours   f/u ecg  f/u echo   f/u trop   will consider secondary htn work if pt is found to have malignant htn after starting bp meds   cardiology consulted Dr. Orta        Afib:   CHADVasc score 1 for htn   will hold AC for now given uncontrolled htn    rate controlled currently     HLD   resume lipitor     GERD:   c/w ppi   Dr. Portillo will plan for EGD on Thursday if bp is stable.    CKD:   Likely from HTN   baseline cr 1.4  monitor Cr   BMP daily     PPX:   HSQ dvt ppx   PPI for gi ppx

## 2020-12-17 NOTE — PROGRESS NOTE ADULT - PROBLEM SELECTOR PLAN 5
- GERD hx  -Going for EGD yesterday   -schedule tomorrow if SBP <180  -NPO after midnight  -Will hold dose of AC in the morning
- GERD hx  -Going for EGD yesterday   -schedule tomorrow if SBP <180  -NPO after midnight  -Will hold dose of AC in the morning

## 2020-12-17 NOTE — PROGRESS NOTE ADULT - SUBJECTIVE AND OBJECTIVE BOX
PGY-1 Progress Note discussed with attending    PAGER #: [[phone]] TILL 5:00 PM  PLEASE CONTACT ON CALL TEAM:  - On Call Team (Please refer to Luisa) FROM 5:00 PM - 8:30PM  - Nightfloat Team FROM 8:30 -7:30 AM    CHIEF COMPLAINT & BRIEF HOSPITAL COURSE:    INTERVAL HPI/OVERNIGHT EVENTS:       REVIEW OF SYSTEMS:  CONSTITUTIONAL: No fever, weight loss, or fatigue  RESPIRATORY: No cough, wheezing, chills or hemoptysis; No shortness of breath  CARDIOVASCULAR: No chest pain, palpitations, dizziness, or leg swelling  GASTROINTESTINAL: No abdominal pain. No nausea, vomiting, or hematemesis; No diarrhea or constipation. No melena or hematochezia.  GENITOURINARY: No dysuria or hematuria, urinary frequency  NEUROLOGICAL: No headaches, memory loss, loss of strength, numbness, or tremors  SKIN: No itching, burning, rashes, or lesions     Vital Signs Last 24 Hrs  T(C): 36.7 (17 Dec 2020 09:29), Max: 37 (16 Dec 2020 23:56)  T(F): 98 (17 Dec 2020 09:29), Max: 98.6 (16 Dec 2020 23:56)  HR: 91 (17 Dec 2020 09:29) (61 - 93)  BP: 166/63 (17 Dec 2020 09:29) (166/63 - 190/93)  BP(mean): --  RR: 18 (17 Dec 2020 09:29) (17 - 18)  SpO2: 98% (17 Dec 2020 09:29) (96% - 98%)    PHYSICAL EXAMINATION:  GENERAL: NAD, well built  HEAD:  Atraumatic, Normocephalic  EYES:  conjunctiva and sclera clear  NECK: Supple, No JVD, Normal thyroid  CHEST/LUNG: Clear to auscultation. Clear to percussion bilaterally; No rales, rhonchi, wheezing, or rubs  HEART: Regular rate and rhythm; No murmurs, rubs, or gallops  ABDOMEN: Soft, Nontender, Nondistended; Bowel sounds present  NERVOUS SYSTEM:  Alert & Oriented X3,    EXTREMITIES:  2+ Peripheral Pulses, No clubbing, cyanosis, or edema  SKIN: warm dry                          17.4   9.64  )-----------( 191      ( 17 Dec 2020 06:25 )             52.6     12-17    140  |  106  |  14  ----------------------------<  103<H>  3.8   |  24  |  1.16    Ca    9.4      17 Dec 2020 06:25  Phos  3.0     12-17  Mg     1.8     12-17    TPro  7.5  /  Alb  3.4<L>  /  TBili  1.3<H>  /  DBili  x   /  AST  27  /  ALT  29  /  AlkPhos  62  12-17    LIVER FUNCTIONS - ( 17 Dec 2020 06:25 )  Alb: 3.4 g/dL / Pro: 7.5 g/dL / ALK PHOS: 62 U/L / ALT: 29 U/L DA / AST: 27 U/L / GGT: x           CARDIAC MARKERS ( 16 Dec 2020 07:16 )  0.099 ng/mL / x     / x     / x     / x      CARDIAC MARKERS ( 15 Dec 2020 23:26 )  0.097 ng/mL / x     / 123 U/L / x     / 3.2 ng/mL  CARDIAC MARKERS ( 15 Dec 2020 18:48 )  0.107 ng/mL / x     / x     / x     / x      CARDIAC MARKERS ( 15 Dec 2020 16:27 )  0.095 ng/mL / x     / x     / x     / x          PT/INR - ( 17 Dec 2020 06:25 )   PT: 12.3 sec;   INR: 1.04 ratio         PTT - ( 17 Dec 2020 06:25 )  PTT:35.6 sec    CAPILLARY BLOOD GLUCOSE      RADIOLOGY & ADDITIONAL TESTS:

## 2020-12-17 NOTE — PROGRESS NOTE ADULT - ATTENDING COMMENTS
Patient seen and examined this morning w/ medical team. Patient is a 65 year old male with a PMH of Afib, HTN, HLD, GERD, ZEYAD, CKD-3 who initially presented for an elective EGD however was admitted for hypertensive emergency in the setting of medication noncompliance. Had mildly elevated but stable troponin ~ 0.09, likely secondary to uncontrolled HTN. BP now significantly continue current BP regimen w/ Hydralazine, Amlodipine, Losartan, and Metoprolol. Appreciate input from Cardiology, Echo w/ normal EF, moderate LVH and LA dilation. Will need close outpatient cardiology follow up with primary Cardiologist in 1-2 weeks. Will hold off restarting Eliquis until outpatient follow with cardiologist and BP remains controlled. Underwent EGD today w/ Dr Portillo which showed severe diffuse gastritis versus portal gastropathy and multiple gastric polyps, recommended Pantoprazole 40 daily for 3 months and outpatient follow up with Dr. Portillo in 1 month. Will restart diet and if BP remains stable will discharge home today.    Rest as per resident's note.

## 2020-12-17 NOTE — DISCHARGE NOTE PROVIDER - PROVIDER TOKENS
PROVIDER:[TOKEN:[[id number]]],PROVIDER:[TOKEN:[[id number]]],PROVIDER:[TOKEN:[[id number]]]

## 2020-12-17 NOTE — DISCHARGE NOTE PROVIDER - CARE PROVIDER_API CALL
Deric Portillo  GASTROENTEROLOGY  [address] [address] [place] [id number]  Phone: [phone]  Fax: [phone]  Follow Up Time:     Lesley Elder  CARDIOVASCULAR DISEASE  [address] [place] [address]  Phone: [phone]  Fax: [phone]  Follow Up Time:     Vicente Acuna (MD)  Geriatric Medicine; Internal Medicine  [address] [place] [address]  Phone: [phone]  Fax: [phone]  Follow Up Time:

## 2020-12-17 NOTE — DISCHARGE NOTE NURSING/CASE MANAGEMENT/SOCIAL WORK - PATIENT PORTAL LINK FT
You can access the FollowMyHealth Patient Portal offered by [facility] by registering at the following website: [email or web address]. By joining [facility] [facility]’s FollowMyHealth portal, you will also be able to view your health information using other applications (apps) compatible with our system.

## 2020-12-18 LAB — RENIN DIRECT, PLASMA: <2.1 PG/ML — SIGNIFICANT CHANGE UP

## 2020-12-21 LAB — SURGICAL PATHOLOGY STUDY: SIGNIFICANT CHANGE UP

## 2020-12-23 PROBLEM — J06.9 ACUTE UPPER RESPIRATORY INFECTION: Status: RESOLVED | Noted: 2020-01-08 | Resolved: 2020-12-23

## 2020-12-23 LAB
METANEPHRINE, PL: 24.3 PG/ML — SIGNIFICANT CHANGE UP (ref 0–88)
NORMETANEPHRINE, PL: 151.6 PG/ML — SIGNIFICANT CHANGE UP (ref 0–191.8)

## 2020-12-31 ENCOUNTER — NON-APPOINTMENT (OUTPATIENT)
Age: 65
End: 2020-12-31

## 2021-01-05 ENCOUNTER — APPOINTMENT (OUTPATIENT)
Dept: INTERNAL MEDICINE | Facility: CLINIC | Age: 66
End: 2021-01-05
Payer: COMMERCIAL

## 2021-01-05 VITALS — DIASTOLIC BLOOD PRESSURE: 90 MMHG | SYSTOLIC BLOOD PRESSURE: 146 MMHG

## 2021-01-05 PROCEDURE — 99214 OFFICE O/P EST MOD 30 MIN: CPT | Mod: 95

## 2021-01-05 NOTE — PHYSICAL EXAM
[No Acute Distress] : no acute distress [Well Nourished] : well nourished [Well Developed] : well developed [Well-Appearing] : well-appearing [No Respiratory Distress] : no respiratory distress  [No Focal Deficits] : no focal deficits [Normal Affect] : the affect was normal [Alert and Oriented x3] : oriented to person, place, and time [Normal Mood] : the mood was normal

## 2021-01-05 NOTE — HISTORY OF PRESENT ILLNESS
[Home] : at home, [unfilled] , at the time of the visit. [Other Location: e.g. Home (Enter Location, City,State)___] : at [unfilled] [Verbal consent obtained from patient] : the patient, [unfilled] [de-identified] : 65-year-old male with a history of hypertension who scheduled this appointment via telehealth after a brief hospitalization.  Patient states that he had a procedure and he gave him some kind of infection and he went to the hospital for 6 days but his fiancée corrected him and told him it was 3 days.  He was not able to elaborate what kind of infection was.  After completing the visit with him I went to the [facility] and reviewed his hospital records.  I also reviewed his [employer] notes spending approximately 15 minutes between the 2.  Evidently the patient has been noncompliant with his antihypertensives.  He has very resistant hypertension as he is on 5 different medications and admitted to me that he had stopped taking his medications back in March.  He was seen by the [employer] month monitoring program and his blood pressure was 210/120.  He had an EGD scheduled the next day and evidently his blood pressure most of skyrocketed because the discharge summary stated he was admitted for hypertensive urgency.  He was started back on amlodipine, losartan, metoprolol and hydralazine.  His creatinine was mildly elevated by report.  He states that he has not been to work during this time and his GI would not give him a note.  Needs a note to return to work on Monday.  Denies any problems/headache at this time.  In addition he has not been taking his atorvastatin and chlorthalidone was missing off of his medication list.  He also has a history of A. fib and needs to follow-up with his cardiologist.  Eliquis was not restarted while he was in the hospital because his blood pressure was so high and they felt that he would be a hemorrhagic stroke risk.

## 2021-01-05 NOTE — ASSESSMENT
[FreeTextEntry1] : 1.  Status post admission for hypertensive urgency.  The patient had a blood pressure monitor at home and I had him take his blood pressure with his fiancée watching while we were doing the visit.  His blood pressure was 146/90.  He is currently on losartan, hydralazine, amlodipine and metoprolol.  Will add back his chlorthalidone 25 mg daily.  Stressed importance of taking medications regularly and as prescribed.\par 2.  YRIS lynn -has been off of Eliquis for months now and it was not restarted while in the hospital because of his markedly elevated blood pressure.  Blood pressure today is acceptable and will restart his Eliquis.  He has follow-up with his cardiologist in 2 weeks.\par 3.  Hyperlipidemia -restart atorvastatin.\par 4.  Gastritis on EGD per review of HIE  -continue PPI\par 5.  Extensive review of hospital records and GI records done.  Also reviewed [employer] notes.\par 6.  Return to office in 1 month, BP check at that time.

## 2021-01-06 ENCOUNTER — APPOINTMENT (OUTPATIENT)
Dept: INTERNAL MEDICINE | Facility: CLINIC | Age: 66
End: 2021-01-06

## 2021-01-11 ENCOUNTER — NON-APPOINTMENT (OUTPATIENT)
Age: 66
End: 2021-01-11

## 2021-01-21 ENCOUNTER — APPOINTMENT (OUTPATIENT)
Dept: CARDIOLOGY | Facility: CLINIC | Age: 66
End: 2021-01-21
Payer: COMMERCIAL

## 2021-01-21 VITALS
SYSTOLIC BLOOD PRESSURE: 224 MMHG | WEIGHT: 265 LBS | DIASTOLIC BLOOD PRESSURE: 147 MMHG | OXYGEN SATURATION: 99 % | HEART RATE: 108 BPM | BODY MASS INDEX: 30.24 KG/M2

## 2021-01-21 PROCEDURE — 93000 ELECTROCARDIOGRAM COMPLETE: CPT

## 2021-01-21 PROCEDURE — 99214 OFFICE O/P EST MOD 30 MIN: CPT

## 2021-01-21 PROCEDURE — 99072 ADDL SUPL MATRL&STAF TM PHE: CPT

## 2021-01-21 RX ORDER — AMLODIPINE BESYLATE 10 MG/1
10 TABLET ORAL DAILY
Qty: 90 | Refills: 3 | Status: DISCONTINUED | COMMUNITY
Start: 2019-03-05 | End: 2021-01-21

## 2021-01-21 RX ORDER — LOSARTAN POTASSIUM 100 MG/1
100 TABLET, FILM COATED ORAL DAILY
Qty: 90 | Refills: 3 | Status: DISCONTINUED | COMMUNITY
Start: 2017-04-28 | End: 2021-01-21

## 2021-01-21 RX ORDER — HYDRALAZINE HYDROCHLORIDE 25 MG/1
25 TABLET ORAL TWICE DAILY
Qty: 180 | Refills: 3 | Status: DISCONTINUED | COMMUNITY
Start: 2019-03-05 | End: 2021-01-21

## 2021-01-21 RX ORDER — LOSARTAN POTASSIUM 100 MG/1
100 TABLET, FILM COATED ORAL DAILY
Qty: 3 | Refills: 3 | Status: DISCONTINUED | COMMUNITY
Start: 2020-12-18 | End: 2021-01-21

## 2021-01-22 NOTE — DISCUSSION/SUMMARY
[___ Month(s)] : [unfilled] month(s) [FreeTextEntry1] : The patient is a 65-year-old gentleman GERD, hyperglycemia, hypertension, hyperlipidemia, chronic atrial fibrillation who is markedly hypertensive secondary to noncompliance\par #1 Afib- rate controlled on metoprolol when he takes it, restart eliquis 5mg bid\par #2 Htn- chlorthalidone, losartan, hydralazine 50mg bid and amlodipine 10mg, compliance emphasized and risk of stroke, dialysis and MI discussed, change losartan 100 to olmesartan 40mg when finishes this prescription\par #3 LIpids- on atorvastatin\par #4 Hyperglycemia- We discussed adherence to a low fat low cholesterol, ADA diet, weight loss and regular daily exercise.\par #5 GERD- on protonix

## 2021-01-22 NOTE — HISTORY OF PRESENT ILLNESS
[FreeTextEntry1] : Chad is a 65-year-old gentleman hypertension, GERD, chronic atrial fibrillation, works at [employer] for [employer]. who has not been here in over a year. He did not take his medications today but brought all the bottles for reconciliation.

## 2021-01-27 ENCOUNTER — APPOINTMENT (OUTPATIENT)
Dept: GASTROENTEROLOGY | Facility: CLINIC | Age: 66
End: 2021-01-27
Payer: COMMERCIAL

## 2021-01-27 VITALS
HEART RATE: 96 BPM | WEIGHT: 267 LBS | SYSTOLIC BLOOD PRESSURE: 201 MMHG | BODY MASS INDEX: 30.89 KG/M2 | TEMPERATURE: 97 F | OXYGEN SATURATION: 99 % | HEIGHT: 78 IN | DIASTOLIC BLOOD PRESSURE: 104 MMHG

## 2021-01-27 DIAGNOSIS — Z87.438 PERSONAL HISTORY OF OTHER DISEASES OF MALE GENITAL ORGANS: ICD-10-CM

## 2021-01-27 DIAGNOSIS — G89.29 LEFT LOWER QUADRANT PAIN: ICD-10-CM

## 2021-01-27 DIAGNOSIS — R19.7 DIARRHEA, UNSPECIFIED: ICD-10-CM

## 2021-01-27 DIAGNOSIS — R10.32 LEFT LOWER QUADRANT PAIN: ICD-10-CM

## 2021-01-27 PROCEDURE — 99213 OFFICE O/P EST LOW 20 MIN: CPT
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
## 2021-01-27 NOTE — ASSESSMENT
[FreeTextEntry1] : Abdominal Pain: The patient complains of abdominal pain. The patient is to avoid nonsteroidal anti-inflammatory drugs and aspirin.  I recommend a low FOD-MAP diet.  I recommend a trial of Dicyclomine 10 mg tablet PO 3 times a day PRN for the abdominal pain.\par Dyspepsia: The patient complains of dyspeptic symptoms.  The patient was advised to abide by an anti-gas diet.  The patient was given a pamphlet for anti-gas.  The patient and I reviewed the anti-gas diet at length. The patient is to start on a trial of Phazyme one tablet 3 times a day p.r.n. abdominal pain and gas.\par GERD: The patient was advised to avoid late-night meals and dietary indiscretions.  The patient was advised to avoid fried and fatty foods.  The patient was advised to abide by an anti-GERD diet. The patient was given a pamphlet for anti-GERD.  The patient and I reviewed the anti-GERD diet at length. I recommend a trial of Pepcid 40 mg twice a day x 3 months for the symptoms.\par Blood Work: I recommend blood work to assess the liver. I recommend a CBC, SMA 24, amylase, lipase, ESR, TFTs, LYRIC, rheumatoid factor, celiac sprue panel, IgA, profile for hepatitis A, B, C. ,AFP, alpha 1 anti-trypsin  antibody, ceruloplasmin level, iron, TIBC, ferritin level, AMA, anti smooth muscle antibody and PT/INR/PTT.  I also recommend obtaining the recent blood work performed by the patient's PMD.\par Diarrhea: The patient complains of diarrhea.  I recommend a low residue diet. The patient is to avoid fiber supplementation. The patient is to consider starting a trial of a probiotic such as Align once a day.   If the symptoms persist, the patient may require sending stool studies for C+S, O+P x3, and C. difficile to assess for an infectious etiology of the diarrhea.   If the symptoms persist, the patient may require a colonoscopy to assess for colitis versus other causes.  The patient was told of the risks and benefits of the procedure.  The patient was told of the risks of perforation, emergency surgery, bleeding, infections and missed lesions.  The patient agreed and will follow-up to reassess the symptoms.  \par Prostate discomfort:  I recommend evaluation with urologist to assess his prostate.\par Follow-up: The patient is to follow-up in the office in 3 months to reassess the symptoms. The patient was told to call the office if any further problems. \par \par \par

## 2021-01-28 ENCOUNTER — APPOINTMENT (OUTPATIENT)
Dept: SURGERY | Facility: CLINIC | Age: 66
End: 2021-01-28
Payer: COMMERCIAL

## 2021-01-28 ENCOUNTER — APPOINTMENT (OUTPATIENT)
Dept: UROLOGY | Facility: CLINIC | Age: 66
End: 2021-01-28
Payer: COMMERCIAL

## 2021-01-28 ENCOUNTER — NON-APPOINTMENT (OUTPATIENT)
Age: 66
End: 2021-01-28

## 2021-01-28 VITALS — SYSTOLIC BLOOD PRESSURE: 164 MMHG | TEMPERATURE: 97 F | DIASTOLIC BLOOD PRESSURE: 118 MMHG | RESPIRATION RATE: 17 BRPM

## 2021-01-28 VITALS
SYSTOLIC BLOOD PRESSURE: 121 MMHG | BODY MASS INDEX: 30.89 KG/M2 | DIASTOLIC BLOOD PRESSURE: 86 MMHG | HEART RATE: 62 BPM | WEIGHT: 267 LBS | OXYGEN SATURATION: 99 % | HEIGHT: 78 IN

## 2021-01-28 DIAGNOSIS — R10.32 LEFT LOWER QUADRANT PAIN: ICD-10-CM

## 2021-01-28 DIAGNOSIS — N43.3 HYDROCELE, UNSPECIFIED: ICD-10-CM

## 2021-01-28 LAB
A1AT SERPL-MCNC: 175 MG/DL
AFP-TM SERPL-MCNC: 3.7 NG/ML
ALBUMIN SERPL ELPH-MCNC: 3.9 G/DL
ALP BLD-CCNC: 83 U/L
ALT SERPL-CCNC: 21 U/L
AMYLASE/CREAT SERPL: 224 U/L
ANION GAP SERPL CALC-SCNC: 15 MMOL/L
APTT BLD: 34 SEC
AST SERPL-CCNC: 28 U/L
BASOPHILS # BLD AUTO: 0.11 K/UL
BASOPHILS NFR BLD AUTO: 1.8 %
BILIRUB SERPL-MCNC: 0.4 MG/DL
BUN SERPL-MCNC: 15 MG/DL
CALCIUM SERPL-MCNC: 9.9 MG/DL
CHLORIDE SERPL-SCNC: 101 MMOL/L
CHOLEST SERPL-MCNC: 244 MG/DL
CO2 SERPL-SCNC: 25 MMOL/L
CREAT SERPL-MCNC: 1.3 MG/DL
EOSINOPHIL # BLD AUTO: 0.06 K/UL
EOSINOPHIL NFR BLD AUTO: 1 %
ERYTHROCYTE [SEDIMENTATION RATE] IN BLOOD BY WESTERGREN METHOD: 10 MM/HR
FERRITIN SERPL-MCNC: 309 NG/ML
GGT SERPL-CCNC: 57 U/L
GLUCOSE SERPL-MCNC: 90 MG/DL
HBV CORE IGG+IGM SER QL: NONREACTIVE
HBV CORE IGM SER QL: NONREACTIVE
HBV SURFACE AB SER QL: NONREACTIVE
HBV SURFACE AG SER QL: NONREACTIVE
HCT VFR BLD CALC: 53.5 %
HCV AB SER QL: NONREACTIVE
HCV S/CO RATIO: 0.18 S/CO
HDLC SERPL-MCNC: 93 MG/DL
HEPATITIS A IGG ANTIBODY: NONREACTIVE
HGB BLD-MCNC: 16.3 G/DL
IGA SER QL IEP: 179 MG/DL
IMM GRANULOCYTES NFR BLD AUTO: 0.5 %
INR PPP: 0.93 RATIO
IRON SATN MFR SERPL: 32 %
IRON SERPL-MCNC: 93 UG/DL
LDLC SERPL CALC-MCNC: 126 MG/DL
LPL SERPL-CCNC: 60 U/L
LYMPHOCYTES # BLD AUTO: 1.66 K/UL
LYMPHOCYTES NFR BLD AUTO: 27.4 %
MAN DIFF?: NORMAL
MCHC RBC-ENTMCNC: 26 PG
MCHC RBC-ENTMCNC: 30.5 GM/DL
MCV RBC AUTO: 85.5 FL
MITOCHONDRIA AB SER IF-ACNC: NORMAL
MONOCYTES # BLD AUTO: 0.69 K/UL
MONOCYTES NFR BLD AUTO: 11.4 %
NEUTROPHILS # BLD AUTO: 3.5 K/UL
NEUTROPHILS NFR BLD AUTO: 57.9 %
NONHDLC SERPL-MCNC: 151 MG/DL
PLATELET # BLD AUTO: 194 K/UL
POTASSIUM SERPL-SCNC: 4.6 MMOL/L
PROT SERPL-MCNC: 6.9 G/DL
PSA SERPL-MCNC: 4.76 NG/ML
PT BLD: 11.1 SEC
RBC # BLD: 6.26 M/UL
RBC # FLD: 16.9 %
RHEUMATOID FACT SER QL: <10 IU/ML
SMOOTH MUSCLE AB SER QL IF: NORMAL
SODIUM SERPL-SCNC: 141 MMOL/L
TIBC SERPL-MCNC: 291 UG/DL
TRIGL SERPL-MCNC: 126 MG/DL
TSH SERPL-ACNC: 0.72 UIU/ML
UIBC SERPL-MCNC: 198 UG/DL
VIT B12 SERPL-MCNC: 381 PG/ML
WBC # FLD AUTO: 6.05 K/UL

## 2021-01-28 PROCEDURE — 99072 ADDL SUPL MATRL&STAF TM PHE: CPT

## 2021-01-28 PROCEDURE — 99243 OFF/OP CNSLTJ NEW/EST LOW 30: CPT

## 2021-01-28 PROCEDURE — 99214 OFFICE O/P EST MOD 30 MIN: CPT

## 2021-01-28 NOTE — PHYSICAL EXAM
[Normal Breath Sounds] : Normal breath sounds [Normal Rate and Rhythm] : normal rate and rhythm [No Rash or Lesion] : No rash or lesion [Alert] : alert [Oriented to Person] : oriented to person [Oriented to Place] : oriented to place [Oriented to Time] : oriented to time [Calm] : calm [JVD] : no jugular venous distention  [de-identified] : A/Ox3; NAD. appears comfortable [de-identified] : EOMI; sclera anicteric. Nasal mucosa pink, septum midline. Oral mucosa pink. Tongue midline, Pharynx without exudates. [de-identified] : Abd is soft, nondistended, no rebound or guarding. No abdominal masses. No abdominal tenderness. [de-identified] : No penile discharge or lesions. No scrotal swelling or discoloration. Testes descended bilaterally, smooth, without masses. Epididymis non-tender. Non Reducible Left Inguinal Hernia  [de-identified] : +ROM, no joint swelling

## 2021-01-28 NOTE — CONSULT LETTER
[Dear  ___] : Dear  [unfilled], [Consult Letter:] : I had the pleasure of evaluating your patient, [unfilled]. [Consult Closing:] : Thank you very much for allowing me to participate in the care of this patient.  If you have any questions, please do not hesitate to contact me. [Sincerely,] : Sincerely, [FreeTextEntry3] : Rad Baird MD\par

## 2021-01-28 NOTE — PLAN
[FreeTextEntry1] : Mr. MARIA EUGENIA HERNANDEZ  was informed of significance of findings. All options, risks and benefits were discussed at length. Informed consent for repair of Left inguinal hernia, with the use of mesh, and the potential post op complications were discussed, including infection, recurrence and other related complications. \par \par Will order CT of Pelvis w IV contrast ; BUN/Cr was done 01/27/21\par I had recommended going to the ER and get cat scan and have the repair done tomorrow. He does not want to go to the ER and does not want the surgery now. He wants to d/w [employer] management which is part of his medical issues.\par Will order the cat scan. But recommend to get the left inguinal hernia which is not reducible to be repaired.\par \par \par
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
## 2021-01-28 NOTE — REVIEW OF SYSTEMS
[Fever] : no fever [Chills] : no chills [Feeling Poorly] : not feeling poorly [Chest Pain] : no chest pain [Lower Ext Edema] : no extremity edema [Shortness Of Breath] : no shortness of breath [Cough] : no cough [Abdominal Pain] : no abdominal pain [Skin Lesions] : no skin lesions [Skin Wound] : no skin wound [Dizziness] : no dizziness [Anxiety] : no anxiety [Muscle Weakness] : no muscle weakness [Swollen Glands] : no swollen glands [FreeTextEntry5] : Gui Oconnor  [FreeTextEntry8] : discomfort, bulge- left side of groin

## 2021-01-28 NOTE — ASSESSMENT
[FreeTextEntry1] : Patient is a 65 y.o M with discomfort and bulge x 2 weeks to right side of groin, found to have a non reducible left inguinal hernia.

## 2021-01-28 NOTE — REVIEW OF SYSTEMS
[see HPI] : see HPI [Penile Lesion] : penile lesion [Penile Pain] : penile pain [Negative] : Heme/Lymph

## 2021-01-29 LAB
24R-OH-CALCIDIOL SERPL-MCNC: 69.8 PG/ML
ANA PAT FLD IF-IMP: ABNORMAL
ANA SER IF-ACNC: ABNORMAL
GLIADIN IGA SER QL: <5 UNITS
GLIADIN IGG SER QL: <5 UNITS
GLIADIN PEPTIDE IGA SER-ACNC: NEGATIVE
GLIADIN PEPTIDE IGG SER-ACNC: NEGATIVE
HBV E AB SER QL: NEGATIVE
HBV E AG SER QL: NEGATIVE
TTG IGA SER IA-ACNC: <1.2 U/ML
TTG IGA SER-ACNC: NEGATIVE
TTG IGG SER IA-ACNC: 13.8 U/ML
TTG IGG SER IA-ACNC: POSITIVE

## 2021-02-01 ENCOUNTER — FORM ENCOUNTER (OUTPATIENT)
Age: 66
End: 2021-02-01

## 2021-02-04 ENCOUNTER — NON-APPOINTMENT (OUTPATIENT)
Age: 66
End: 2021-02-04

## 2021-02-10 ENCOUNTER — OUTPATIENT (OUTPATIENT)
Dept: OUTPATIENT SERVICES | Facility: HOSPITAL | Age: 66
LOS: 1 days | End: 2021-02-10
Payer: COMMERCIAL

## 2021-02-10 ENCOUNTER — APPOINTMENT (OUTPATIENT)
Dept: CT IMAGING | Facility: HOSPITAL | Age: 66
End: 2021-02-10
Payer: COMMERCIAL

## 2021-02-10 ENCOUNTER — RESULT REVIEW (OUTPATIENT)
Age: 66
End: 2021-02-10

## 2021-02-10 DIAGNOSIS — T14.8 OTHER INJURY OF UNSPECIFIED BODY REGION: Chronic | ICD-10-CM

## 2021-02-10 DIAGNOSIS — Z98.890 OTHER SPECIFIED POSTPROCEDURAL STATES: Chronic | ICD-10-CM

## 2021-02-10 DIAGNOSIS — R10.32 LEFT LOWER QUADRANT PAIN: ICD-10-CM

## 2021-02-10 PROCEDURE — 72193 CT PELVIS W/DYE: CPT

## 2021-02-10 PROCEDURE — 72193 CT PELVIS W/DYE: CPT | Mod: 26

## 2021-02-11 NOTE — ASSESSMENT
[FreeTextEntry1] : suspect incarcerated hernia \par likely fat as it has been present for 2 weeks and he is otherwise feeling well\par will see dr carson johnsfor mildred

## 2021-02-11 NOTE — PHYSICAL EXAM
[General Appearance - Well Developed] : well developed [General Appearance - Well Nourished] : well nourished [Normal Appearance] : normal appearance [Well Groomed] : well groomed [General Appearance - In No Acute Distress] : no acute distress [Edema] : no peripheral edema [Respiration, Rhythm And Depth] : normal respiratory rhythm and effort [Exaggerated Use Of Accessory Muscles For Inspiration] : no accessory muscle use [Abdomen Soft] : soft [Abdomen Tenderness] : non-tender [Costovertebral Angle Tenderness] : no ~M costovertebral angle tenderness [Urethral Meatus] : meatus normal [Urinary Bladder Findings] : the bladder was normal on palpation [Normal Station and Gait] : the gait and station were normal for the patient's age [] : no rash [No Focal Deficits] : no focal deficits [Oriented To Time, Place, And Person] : oriented to person, place, and time [Affect] : the affect was normal [Mood] : the mood was normal [Not Anxious] : not anxious [No Palpable Adenopathy] : no palpable adenopathy [FreeTextEntry1] : right hydrocele

## 2021-02-11 NOTE — HISTORY OF PRESENT ILLNESS
[FreeTextEntry1] : cc left groin pain \par 66 yo male c/o left groin pain /swelling \par has been present for 2 weeks \par occurred after intercourse \par has  long h/o right hydrocele

## 2021-02-12 ENCOUNTER — NON-APPOINTMENT (OUTPATIENT)
Age: 66
End: 2021-02-12

## 2021-02-16 ENCOUNTER — APPOINTMENT (OUTPATIENT)
Dept: INTERNAL MEDICINE | Facility: CLINIC | Age: 66
End: 2021-02-16

## 2021-02-18 ENCOUNTER — APPOINTMENT (OUTPATIENT)
Dept: SURGERY | Facility: CLINIC | Age: 66
End: 2021-02-18
Payer: COMMERCIAL

## 2021-02-18 VITALS — TEMPERATURE: 94.1 F

## 2021-02-18 DIAGNOSIS — K40.30 UNILATERAL INGUINAL HERNIA, WITH OBSTRUCTION, W/OUT GANGRENE, NOT SPECIFIED AS RECURRENT: ICD-10-CM

## 2021-02-18 PROCEDURE — 99072 ADDL SUPL MATRL&STAF TM PHE: CPT

## 2021-02-18 PROCEDURE — 99214 OFFICE O/P EST MOD 30 MIN: CPT

## 2021-02-18 NOTE — HISTORY OF PRESENT ILLNESS
[de-identified] : Mr. MARIA EUGENIA HERNANDEZ is a 65 year y.o M who presents to the office w the cc of having a lump to the left side of his groin, which was found to be c/w left inguinal hernia. CT of pelvis was done 02/10/21, c/w new fat containing left inguinal hernia. \par Today, patient is without reported complaints, stating he's feeling slightly better.

## 2021-02-18 NOTE — ASSESSMENT
[FreeTextEntry1] : Patient is a 65 y.o M with hx of BL inguinal hernia repair, at age 9; presents with recurrent incarcerated Left inguinal hernia.

## 2021-02-18 NOTE — PHYSICAL EXAM
[Normal Breath Sounds] : Normal breath sounds [Normal Rate and Rhythm] : normal rate and rhythm [No Rash or Lesion] : No rash or lesion [Alert] : alert [Oriented to Person] : oriented to person [Oriented to Place] : oriented to place [Oriented to Time] : oriented to time [Calm] : calm [JVD] : no jugular venous distention  [de-identified] : A/Ox3; NAD. appears comfortable [de-identified] : EOMI; sclera anicteric. Nasal mucosa pink, septum midline. Oral mucosa pink. Tongue midline, Pharynx without exudates. [de-identified] : Abd is soft, nondistended, no rebound or guarding. No abdominal masses. No abdominal tenderness. [de-identified] : Reducible Incarcerated Left Inguinal Hernia  [de-identified] : +ROM, no joint swelling

## 2021-02-18 NOTE — PLAN
[FreeTextEntry1] : Mr. MARIA EUGENIA HERNANDEZ  was informed of significance of findings. All options, risks and benefits were discussed at length. Informed consent for repair of recurrent incarcerated left inguinal hernia, with the use of mesh, and the potential post op complications were discussed, including infection, recurrence and other related complications. \par The patient wishes to proceed with the planned surgery. We will schedule for surgery at the next available date, pending any required insurance pre-certification or pre-approval. Patient agrees to obtain any necessary pre-operative evaluations and testing prior to surgery.\par he was advised to seek immediate medical attention with any acute change in symptoms or with the development of any new or worsening symptoms. \par Patient's questions and concerns addressed to patient's satisfaction, and patient verbalized an understanding of the information discussed.\par \par \par Will schedule for the surgery at [facility] [place]. \par \par \par

## 2021-02-23 ENCOUNTER — FORM ENCOUNTER (OUTPATIENT)
Age: 66
End: 2021-02-23

## 2021-02-24 ENCOUNTER — NON-APPOINTMENT (OUTPATIENT)
Age: 66
End: 2021-02-24

## 2021-02-25 ENCOUNTER — APPOINTMENT (OUTPATIENT)
Dept: INTERNAL MEDICINE | Facility: CLINIC | Age: 66
End: 2021-02-25
Payer: COMMERCIAL

## 2021-02-25 VITALS
HEART RATE: 84 BPM | HEIGHT: 78 IN | DIASTOLIC BLOOD PRESSURE: 110 MMHG | WEIGHT: 270 LBS | SYSTOLIC BLOOD PRESSURE: 190 MMHG | BODY MASS INDEX: 31.24 KG/M2 | OXYGEN SATURATION: 98 %

## 2021-02-25 DIAGNOSIS — N32.89 OTHER SPECIFIED DISORDERS OF BLADDER: ICD-10-CM

## 2021-02-25 DIAGNOSIS — Z01.818 ENCOUNTER FOR OTHER PREPROCEDURAL EXAMINATION: ICD-10-CM

## 2021-02-25 LAB
GLUCOSE BLDC GLUCOMTR-MCNC: 118
HBA1C MFR BLD HPLC: 6.3

## 2021-02-25 PROCEDURE — 82962 GLUCOSE BLOOD TEST: CPT

## 2021-02-25 PROCEDURE — 99213 OFFICE O/P EST LOW 20 MIN: CPT | Mod: 25

## 2021-02-25 PROCEDURE — 99072 ADDL SUPL MATRL&STAF TM PHE: CPT

## 2021-02-25 PROCEDURE — 83036 HEMOGLOBIN GLYCOSYLATED A1C: CPT | Mod: QW

## 2021-02-28 NOTE — ASSESSMENT
[FreeTextEntry1] : 66 yo male with resistant HTN and markedly elevated BP today.  He could not tell me what meds he was taking nor am I sure if he is taking everything as he made contradictory statements.  Will have him RTO in 1 weeks with all of his medication bottles to do a reconciliation.  Advised him that he will need cardiac clearance prior to his surgery which has yet to be scheduled yet.  \par \par We did a HgbA1C today which showed good control of his DM.  HgbA1C = 6.3, at goal.\par \par Finally, he was referred to urology for the bladder wall thickening.

## 2021-02-28 NOTE — HISTORY OF PRESENT ILLNESS
[de-identified] : 64 yo male with a h/o resistant HTN, afib, DM, poor compliance with f/u and taking meds here for f/u of his telehealth visit.  In the interim he saw cardiology who wanted to switch his losartan to olmesartan.  He is unsure what medication he is taking and did not bring his bottles with him.  He also developed a lump in his left groin and saw surgery who found that he has an incarcerated hernia.  He is awaiting a surgical date.  He tells me that his BP at home is better and that he has a white coat element.  Recent cardiology and surgical notes reviewed.\par \par On recent CT scan of the abdomen done by surgery he was found to have possible bladder wall thickening.  Some BPH like symptoms.

## 2021-03-01 ENCOUNTER — APPOINTMENT (OUTPATIENT)
Dept: SURGERY | Facility: CLINIC | Age: 66
End: 2021-03-01
Payer: COMMERCIAL

## 2021-03-01 VITALS
WEIGHT: 270 LBS | BODY MASS INDEX: 31.24 KG/M2 | SYSTOLIC BLOOD PRESSURE: 224 MMHG | HEART RATE: 95 BPM | HEIGHT: 78 IN | DIASTOLIC BLOOD PRESSURE: 138 MMHG | TEMPERATURE: 97.1 F | OXYGEN SATURATION: 99 %

## 2021-03-01 PROCEDURE — 99213 OFFICE O/P EST LOW 20 MIN: CPT

## 2021-03-01 PROCEDURE — 99072 ADDL SUPL MATRL&STAF TM PHE: CPT

## 2021-03-01 NOTE — PHYSICAL EXAM
[Normal Breath Sounds] : Normal breath sounds [Normal Rate and Rhythm] : normal rate and rhythm [No Rash or Lesion] : No rash or lesion [Alert] : alert [Oriented to Person] : oriented to person [Oriented to Place] : oriented to place [Oriented to Time] : oriented to time [Calm] : calm [JVD] : no jugular venous distention  [de-identified] : A/Ox3; NAD. appears comfortable [de-identified] : EOMI; sclera anicteric. Nasal mucosa pink, septum midline. Oral mucosa pink. Tongue midline, Pharynx without exudates. [de-identified] : Abd is soft, nondistended, no rebound or guarding. No abdominal masses. No abdominal tenderness. [de-identified] : Reducible Incarcerated Left Inguinal Hernia  [de-identified] : +ROM, no joint swelling

## 2021-03-01 NOTE — HISTORY OF PRESENT ILLNESS
[de-identified] : Mr. MARIA EUGENIA HERNANDEZ is a 65 year y.o M who presents to the office w the cc of having a lump to the left side of his groin, which was found to be c/w left inguinal hernia. CT of pelvis was done 02/10/21, c/w new fat containing left inguinal hernia. \par Patient states he has some questions regarding his condition. He believes his swelling had gone down and wants to know if he still needs to have surgical repair.

## 2021-03-01 NOTE — PLAN
[FreeTextEntry1] : repair of recurrent incarcerated left inguinal hernia scheduled for 03/19/21\par \par Patient's questions and concerns addressed to their satisfaction, and patient verbalized an understanding of the information discussed.\par

## 2021-03-03 ENCOUNTER — APPOINTMENT (OUTPATIENT)
Dept: UROLOGY | Facility: CLINIC | Age: 66
End: 2021-03-03
Payer: COMMERCIAL

## 2021-03-03 PROCEDURE — 99072 ADDL SUPL MATRL&STAF TM PHE: CPT

## 2021-03-03 PROCEDURE — 99213 OFFICE O/P EST LOW 20 MIN: CPT

## 2021-03-04 ENCOUNTER — APPOINTMENT (OUTPATIENT)
Dept: CARDIOLOGY | Facility: CLINIC | Age: 66
End: 2021-03-04
Payer: COMMERCIAL

## 2021-03-04 ENCOUNTER — NON-APPOINTMENT (OUTPATIENT)
Age: 66
End: 2021-03-04

## 2021-03-04 VITALS
HEIGHT: 78 IN | WEIGHT: 270 LBS | DIASTOLIC BLOOD PRESSURE: 99 MMHG | HEART RATE: 74 BPM | SYSTOLIC BLOOD PRESSURE: 194 MMHG | OXYGEN SATURATION: 99 % | BODY MASS INDEX: 31.24 KG/M2

## 2021-03-04 PROCEDURE — 99072 ADDL SUPL MATRL&STAF TM PHE: CPT

## 2021-03-04 PROCEDURE — 93000 ELECTROCARDIOGRAM COMPLETE: CPT

## 2021-03-04 PROCEDURE — 99214 OFFICE O/P EST MOD 30 MIN: CPT

## 2021-03-04 NOTE — DISCUSSION/SUMMARY
[Procedure Intermediate Risk] : the procedure risk is intermediate [Patient Intermediate Risk] : the patient is an intermediate risk [Optimized for Surgery] : the patient is optimized for surgery [FreeTextEntry1] : The patient is a 65-year-old gentleman GERD, hyperglycemia, hypertension, hyperlipidemia, chronic atrial fibrillation who is  hypertensive secondary to noncompliance\par #1 Afib- rate controlled on metoprolol, continue eliquis 5mg bid\par #2 Htn-  continue chlorthalidone, olmesartan, hydralazine 25mg bid and amlodipine 5mg, compliance emphasized and risk of stroke, dialysis and MI discussed, new rx sent\par #3 LIpids- on atorvastatin\par #4 Hyperglycemia- We discussed adherence to a low fat low cholesterol, ADA diet, weight loss and regular daily exercise.\par #5 GERD- on protonix\par #6 General- There are no cardiac contraindications to hernia repair if blood pressure less than 160 with  medication. He needs to stop eliquis two days prior to surgery.  [___ Week(s)] : [unfilled] week(s)

## 2021-03-05 ENCOUNTER — APPOINTMENT (OUTPATIENT)
Dept: INTERNAL MEDICINE | Facility: CLINIC | Age: 66
End: 2021-03-05
Payer: COMMERCIAL

## 2021-03-05 VITALS
SYSTOLIC BLOOD PRESSURE: 202 MMHG | HEART RATE: 78 BPM | OXYGEN SATURATION: 98 % | WEIGHT: 270 LBS | BODY MASS INDEX: 30.81 KG/M2 | DIASTOLIC BLOOD PRESSURE: 98 MMHG

## 2021-03-05 DIAGNOSIS — E55.9 VITAMIN D DEFICIENCY, UNSPECIFIED: ICD-10-CM

## 2021-03-05 PROCEDURE — 99072 ADDL SUPL MATRL&STAF TM PHE: CPT

## 2021-03-05 PROCEDURE — 99213 OFFICE O/P EST LOW 20 MIN: CPT

## 2021-03-05 RX ORDER — CYCLOBENZAPRINE HYDROCHLORIDE 10 MG/1
10 TABLET, FILM COATED ORAL
Qty: 15 | Refills: 1 | Status: DISCONTINUED | COMMUNITY
Start: 2020-07-09 | End: 2021-03-05

## 2021-03-05 RX ORDER — PANTOPRAZOLE 40 MG/1
40 TABLET, DELAYED RELEASE ORAL DAILY
Qty: 30 | Refills: 3 | Status: DISCONTINUED | COMMUNITY
Start: 2020-11-30 | End: 2021-03-05

## 2021-03-05 RX ORDER — METOPROLOL SUCCINATE 25 MG/1
25 TABLET, EXTENDED RELEASE ORAL DAILY
Qty: 30 | Refills: 3 | Status: DISCONTINUED | COMMUNITY
Start: 2020-12-18 | End: 2021-03-05

## 2021-03-05 RX ORDER — PANTOPRAZOLE 40 MG/1
40 TABLET, DELAYED RELEASE ORAL
Qty: 30 | Refills: 3 | Status: DISCONTINUED | COMMUNITY
Start: 2020-05-06 | End: 2021-03-05

## 2021-03-05 RX ORDER — METRONIDAZOLE 500 MG/1
500 TABLET ORAL 3 TIMES DAILY
Qty: 42 | Refills: 0 | Status: DISCONTINUED | COMMUNITY
Start: 2020-12-24 | End: 2021-03-05

## 2021-03-05 RX ORDER — PANTOPRAZOLE 40 MG/1
40 TABLET, DELAYED RELEASE ORAL DAILY
Qty: 30 | Refills: 3 | Status: DISCONTINUED | COMMUNITY
Start: 2020-12-18 | End: 2021-03-05

## 2021-03-08 ENCOUNTER — EMERGENCY (EMERGENCY)
Facility: HOSPITAL | Age: 66
LOS: 1 days | Discharge: HOME | End: 2021-03-08
Attending: STUDENT IN AN ORGANIZED HEALTH CARE EDUCATION/TRAINING PROGRAM
Payer: COMMERCIAL

## 2021-03-08 ENCOUNTER — NON-APPOINTMENT (OUTPATIENT)
Age: 66
End: 2021-03-08

## 2021-03-08 ENCOUNTER — OUTPATIENT (OUTPATIENT)
Dept: OUTPATIENT SERVICES | Facility: HOSPITAL | Age: 66
LOS: 1 days | End: 2021-03-08
Payer: COMMERCIAL

## 2021-03-08 VITALS
OXYGEN SATURATION: 99 % | DIASTOLIC BLOOD PRESSURE: 127 MMHG | RESPIRATION RATE: 18 BRPM | HEART RATE: 86 BPM | HEIGHT: 78 IN | SYSTOLIC BLOOD PRESSURE: 203 MMHG | WEIGHT: 270.07 LBS | TEMPERATURE: 98 F

## 2021-03-08 VITALS
SYSTOLIC BLOOD PRESSURE: 173 MMHG | DIASTOLIC BLOOD PRESSURE: 98 MMHG | TEMPERATURE: 100 F | OXYGEN SATURATION: 98 % | HEART RATE: 77 BPM | RESPIRATION RATE: 17 BRPM

## 2021-03-08 VITALS
DIASTOLIC BLOOD PRESSURE: 121 MMHG | SYSTOLIC BLOOD PRESSURE: 212 MMHG | TEMPERATURE: 99 F | OXYGEN SATURATION: 99 % | RESPIRATION RATE: 18 BRPM | HEIGHT: 78 IN | WEIGHT: 272.93 LBS | HEART RATE: 79 BPM

## 2021-03-08 DIAGNOSIS — K40.30 UNILATERAL INGUINAL HERNIA, WITH OBSTRUCTION, WITHOUT GANGRENE, NOT SPECIFIED AS RECURRENT: ICD-10-CM

## 2021-03-08 DIAGNOSIS — Z01.818 ENCOUNTER FOR OTHER PREPROCEDURAL EXAMINATION: ICD-10-CM

## 2021-03-08 DIAGNOSIS — G47.33 OBSTRUCTIVE SLEEP APNEA (ADULT) (PEDIATRIC): ICD-10-CM

## 2021-03-08 DIAGNOSIS — Z98.890 OTHER SPECIFIED POSTPROCEDURAL STATES: Chronic | ICD-10-CM

## 2021-03-08 DIAGNOSIS — R12 HEARTBURN: ICD-10-CM

## 2021-03-08 DIAGNOSIS — Z87.898 PERSONAL HISTORY OF OTHER SPECIFIED CONDITIONS: ICD-10-CM

## 2021-03-08 DIAGNOSIS — T14.8 OTHER INJURY OF UNSPECIFIED BODY REGION: Chronic | ICD-10-CM

## 2021-03-08 DIAGNOSIS — K40.90 UNILATERAL INGUINAL HERNIA, WITHOUT OBSTRUCTION OR GANGRENE, NOT SPECIFIED AS RECURRENT: ICD-10-CM

## 2021-03-08 DIAGNOSIS — I10 ESSENTIAL (PRIMARY) HYPERTENSION: ICD-10-CM

## 2021-03-08 PROCEDURE — 99284 EMERGENCY DEPT VISIT MOD MDM: CPT

## 2021-03-08 PROCEDURE — 93005 ELECTROCARDIOGRAM TRACING: CPT

## 2021-03-08 PROCEDURE — 96374 THER/PROPH/DIAG INJ IV PUSH: CPT

## 2021-03-08 PROCEDURE — 99284 EMERGENCY DEPT VISIT MOD MDM: CPT | Mod: 25

## 2021-03-08 PROCEDURE — G0463: CPT

## 2021-03-08 RX ORDER — ATORVASTATIN CALCIUM 80 MG/1
1 TABLET, FILM COATED ORAL
Qty: 0 | Refills: 0 | DISCHARGE

## 2021-03-08 RX ORDER — HYDRALAZINE HCL 50 MG
10 TABLET ORAL ONCE
Refills: 0 | Status: COMPLETED | OUTPATIENT
Start: 2021-03-08 | End: 2021-03-08

## 2021-03-08 RX ORDER — METOPROLOL TARTRATE 50 MG
25 TABLET ORAL ONCE
Refills: 0 | Status: COMPLETED | OUTPATIENT
Start: 2021-03-08 | End: 2021-03-08

## 2021-03-08 RX ORDER — AMLODIPINE BESYLATE 2.5 MG/1
10 TABLET ORAL ONCE
Refills: 0 | Status: COMPLETED | OUTPATIENT
Start: 2021-03-08 | End: 2021-03-08

## 2021-03-08 RX ORDER — HYDRALAZINE HCL 50 MG
50 TABLET ORAL ONCE
Refills: 0 | Status: COMPLETED | OUTPATIENT
Start: 2021-03-08 | End: 2021-03-08

## 2021-03-08 RX ADMIN — Medication 50 MILLIGRAM(S): at 14:23

## 2021-03-08 RX ADMIN — AMLODIPINE BESYLATE 10 MILLIGRAM(S): 2.5 TABLET ORAL at 17:58

## 2021-03-08 RX ADMIN — Medication 10 MILLIGRAM(S): at 17:56

## 2021-03-08 RX ADMIN — Medication 25 MILLIGRAM(S): at 16:27

## 2021-03-08 NOTE — ED PROVIDER NOTE - NSFOLLOWUPINSTRUCTIONS_ED_ALL_ED_FT
You were seen in the emergency department for elevated blood pressure.     Please follow-up with your primary care doctor in the next 24-48 hours.     Please continue to take your medications as prescribed.     If you have any worsening symptoms, severe headache, chest pain or shortness of breath, please return to the emergency department.

## 2021-03-08 NOTE — ED PROVIDER NOTE - PATIENT PORTAL LINK FT
You can access the FollowMyHealth Patient Portal offered by [facility] by registering at the following website: [email or web address]. By joining [employer]’s FollowMyHealth portal, you will also be able to view your health information using other applications (apps) compatible with our system.

## 2021-03-08 NOTE — ED PROVIDER NOTE - CLINICAL SUMMARY MEDICAL DECISION MAKING FREE TEXT BOX
65M presenting with hypertension. no headache, chest pain or shortness of breath. due for afternoon medications. low concern for ACS, hypertensive emergency, dissection. will give dose and reassess.

## 2021-03-08 NOTE — H&P PST ADULT - CARDIOVASCULAR COMMENTS
Hx hypertension, hyperlipidemia, Afib Patient reports not taking BP meds in attempts to arrive on time for pst appt. patient was advised to remain complaint with meds as prescribed. Asymptomatic. Will refer to ER for further management of BP

## 2021-03-08 NOTE — PHYSICAL EXAM
[No Acute Distress] : no acute distress [Well Nourished] : well nourished [Well Developed] : well developed [Well-Appearing] : well-appearing [Clear to Auscultation] : lungs were clear to auscultation bilaterally [Normal Rate] : normal rate

## 2021-03-08 NOTE — ED ADULT TRIAGE NOTE - CHIEF COMPLAINT QUOTE
PT SENT FROM PREADMISSION FOR ELEVATED BP. R 226/130, L 212/ 121. DENIES DIZZINESS, BLURRED VISION, CHEST PAIN

## 2021-03-08 NOTE — ED ADULT NURSE NOTE - OBJECTIVE STATEMENT
Patient present to ED after coming to pre surgical testing and was noted patient had elevated B/P. Patient notes took all his medication

## 2021-03-08 NOTE — ASSESSMENT
[FreeTextEntry1] : 66 yo male with resistant hypertension probably with a component of superimposed white coat HTN.  Med rec done as he is not taking his medications properly.  Today he took two different calcium channel blockers.  i went through and removed all medications that he should no longer be taking.  I advised him to go to the pharmacy today to  the medications that cardiology sent in for him.  We discussed that he needs to get his BP less than 160 to proceed with his surgery.  He understood this and will follow up with me in a few months.  Appreciate cardiolgy and surgery notes.

## 2021-03-08 NOTE — H&P PST ADULT - NSICDXPASTMEDICALHX_GEN_ALL_CORE_FT
PAST MEDICAL HISTORY:  Afib     Heartburn     Hyperlipidemia     Hypertension     ZEYAD (obstructive sleep apnea)      PAST MEDICAL HISTORY:  Afib     Heartburn     History of bilateral inguinal hernias     Hyperlipidemia     Hypertension     ZEYAD (obstructive sleep apnea)

## 2021-03-08 NOTE — H&P PST ADULT - REASON FOR ADMISSION
Repair of recurrence incarcerated left inguinal hernia with mesh Repair of recurrent incarcerated left inguinal hernia with mesh

## 2021-03-08 NOTE — ED PROVIDER NOTE - OBJECTIVE STATEMENT
65M, pmh of Afib, HTN, HLD, GERD, ZEYAD, CKD, presenting with hypertension. patient was at pre-surgical testing for hernia repair and was sent to ED for elevated blood pressure. patient denies any headache, chest pain, shortness of breath, nausea or vomiting. took his morning medications but is due for his afternoon dose.

## 2021-03-08 NOTE — H&P PST ADULT - HISTORY OF PRESENT ILLNESS
65year old male with pmhx of  65year old male with pmhx of hypertension, hyperlipidemia, AFib, ZEYAD on CPAP, obesity, heartburn presents with c/o recurring left inguinal hernia. Patient is here today for presurgical testing for scheduled repair of recurrent incarcerated left inguinal hernia with mesh on 3/19/2021

## 2021-03-08 NOTE — H&P PST ADULT - CARDIOVASCULAR DETAILS
BP today readings today R arm 212/121, L 227/130, R arm manual 226/130 L arm manual 224/126/positive S1/positive S2
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
## 2021-03-08 NOTE — H&P PST ADULT - NSICDXPROBLEM_GEN_ALL_CORE_FT
PROBLEM DIAGNOSES  Problem: Unilateral inguinal hernia without obstruction or gangrene  Assessment and Plan: Patient is scheduled for repair of recurrence incarcerated left inguinal hernia with mesh on 3/19/2021. Written and oral preoperative instruction given to patient with understanding verbalized. Instructions given include maintaining NPO status post-midnight day before surgery and using 4% chlorhexidine wash as directed the morning of surgery    Problem: Hypertension  Assessment and Plan: Patient instructed with understanding verbalized to take amlodipine, hydralazine, metoprolol and losartan with a sip of water the morning of surgery.      Problem: Heartburn  Assessment and Plan: Patient instructed with understanding verbalized to take pantoprazole  with a sip of water the morning of surgery.       PROBLEM DIAGNOSES  Problem: ZEYAD on CPAP  Assessment and Plan: Perioperative ZEYAD precautions to be maintained. OR booking informed     Problem: Unilateral inguinal hernia without obstruction or gangrene  Assessment and Plan: Patient is scheduled for repair of recurrence incarcerated left inguinal hernia with mesh on 3/19/2021. Written and oral preoperative instruction given to patient with understanding verbalized. Instructions given include maintaining NPO status post-midnight day before surgery and using 4% chlorhexidine wash as directed the morning of surgery    Problem: Hypertension  Assessment and Plan: Patient instructed with understanding verbalized to take amlodipine, hydralazine, metoprolol and losartan with a sip of water the morning of surgery.     Elevated BP reading today R arm 212/121, L 227/130, R arm manual 226/130 L arm manual 224/126. Patient remained asymptomatic, was assisted to ER for further management of BP     Problem: Heartburn  Assessment and Plan: Patient instructed with understanding verbalized to take pantoprazole  with a sip of water the morning of surgery.

## 2021-03-08 NOTE — HISTORY OF PRESENT ILLNESS
[de-identified] : 64 yo male with a h/o resistant HTN, HTN and plans for a hernia repair here for a BP check and to go over his medications.  He brought most of his medications in with him for me to reconcile.  He had old bottles mixed with new and appears to have taken both amlodipine and nifedipine today.  He has yet to  his new prescriptions ordered by Cardiology.  His Eliquis was not in the bag and he had 2 different types of hydralazine.\par \par He tells me that he checks his BP at home and it is never as high as it is when he is here.  BP's at home are 150-160.

## 2021-03-14 DIAGNOSIS — Z01.818 ENCOUNTER FOR OTHER PREPROCEDURAL EXAMINATION: ICD-10-CM

## 2021-03-15 NOTE — HISTORY OF PRESENT ILLNESS
[FreeTextEntry1] : \par Mr. MARIA EUGENIA HERNANDEZ is a 65 year y.o M who presents to the office w the cc of having a lump to the left side of his groin, which was found to be c/w left inguinal hernia. CT of pelvis was done 02/10/21, c/w new fat containing left inguinal hernia. \par Patient states he has some questions regarding his condition. He believes his swelling had gone down and wants to know if he still needs to have surgical repair.

## 2021-03-16 ENCOUNTER — APPOINTMENT (OUTPATIENT)
Dept: OTHER | Facility: CLINIC | Age: 66
End: 2021-03-16
Payer: COMMERCIAL

## 2021-03-16 ENCOUNTER — APPOINTMENT (OUTPATIENT)
Dept: DISASTER EMERGENCY | Facility: CLINIC | Age: 66
End: 2021-03-16

## 2021-03-16 VITALS
SYSTOLIC BLOOD PRESSURE: 200 MMHG | WEIGHT: 270 LBS | RESPIRATION RATE: 18 BRPM | HEIGHT: 78 IN | BODY MASS INDEX: 31.24 KG/M2 | TEMPERATURE: 97.4 F | HEART RATE: 78 BPM | DIASTOLIC BLOOD PRESSURE: 80 MMHG | OXYGEN SATURATION: 98 %

## 2021-03-16 PROBLEM — R12 HEARTBURN: Chronic | Status: ACTIVE | Noted: 2021-03-08

## 2021-03-16 PROBLEM — Z87.19 PERSONAL HISTORY OF OTHER DISEASES OF THE DIGESTIVE SYSTEM: Chronic | Status: ACTIVE | Noted: 2021-03-08

## 2021-03-16 PROCEDURE — 99203 OFFICE O/P NEW LOW 30 MIN: CPT

## 2021-03-17 LAB — SARS-COV-2 N GENE NPH QL NAA+PROBE: NOT DETECTED

## 2021-03-18 ENCOUNTER — TRANSCRIPTION ENCOUNTER (OUTPATIENT)
Age: 66
End: 2021-03-18

## 2021-03-19 ENCOUNTER — RESULT REVIEW (OUTPATIENT)
Age: 66
End: 2021-03-19

## 2021-03-19 ENCOUNTER — OUTPATIENT (OUTPATIENT)
Dept: OUTPATIENT SERVICES | Facility: HOSPITAL | Age: 66
LOS: 1 days | End: 2021-03-19
Payer: COMMERCIAL

## 2021-03-19 ENCOUNTER — APPOINTMENT (OUTPATIENT)
Dept: SURGERY | Facility: HOSPITAL | Age: 66
End: 2021-03-19

## 2021-03-19 VITALS
HEART RATE: 67 BPM | RESPIRATION RATE: 18 BRPM | TEMPERATURE: 99 F | WEIGHT: 272.93 LBS | DIASTOLIC BLOOD PRESSURE: 83 MMHG | SYSTOLIC BLOOD PRESSURE: 179 MMHG | HEIGHT: 78 IN | OXYGEN SATURATION: 98 %

## 2021-03-19 VITALS
TEMPERATURE: 98 F | RESPIRATION RATE: 16 BRPM | SYSTOLIC BLOOD PRESSURE: 142 MMHG | OXYGEN SATURATION: 96 % | DIASTOLIC BLOOD PRESSURE: 90 MMHG | HEART RATE: 90 BPM

## 2021-03-19 DIAGNOSIS — T14.8 OTHER INJURY OF UNSPECIFIED BODY REGION: Chronic | ICD-10-CM

## 2021-03-19 DIAGNOSIS — Z98.890 OTHER SPECIFIED POSTPROCEDURAL STATES: Chronic | ICD-10-CM

## 2021-03-19 DIAGNOSIS — K40.30 UNILATERAL INGUINAL HERNIA, WITH OBSTRUCTION, WITHOUT GANGRENE, NOT SPECIFIED AS RECURRENT: ICD-10-CM

## 2021-03-19 LAB — GLUCOSE BLDC GLUCOMTR-MCNC: 102 MG/DL — HIGH (ref 70–99)

## 2021-03-19 PROCEDURE — C1781: CPT

## 2021-03-19 PROCEDURE — 71046 X-RAY EXAM CHEST 2 VIEWS: CPT

## 2021-03-19 PROCEDURE — 88305 TISSUE EXAM BY PATHOLOGIST: CPT

## 2021-03-19 PROCEDURE — 49521 REREPAIR ING HERNIA BLOCKED: CPT

## 2021-03-19 PROCEDURE — 82962 GLUCOSE BLOOD TEST: CPT

## 2021-03-19 PROCEDURE — 49521 REREPAIR ING HERNIA BLOCKED: CPT | Mod: LT

## 2021-03-19 PROCEDURE — 88305 TISSUE EXAM BY PATHOLOGIST: CPT | Mod: 26

## 2021-03-19 PROCEDURE — 71046 X-RAY EXAM CHEST 2 VIEWS: CPT | Mod: 26

## 2021-03-19 RX ORDER — FENTANYL CITRATE 50 UG/ML
25 INJECTION INTRAVENOUS
Refills: 0 | Status: DISCONTINUED | OUTPATIENT
Start: 2021-03-19 | End: 2021-03-21

## 2021-03-19 RX ORDER — HYDROMORPHONE HYDROCHLORIDE 2 MG/ML
0.5 INJECTION INTRAMUSCULAR; INTRAVENOUS; SUBCUTANEOUS
Refills: 0 | Status: DISCONTINUED | OUTPATIENT
Start: 2021-03-19 | End: 2021-03-21

## 2021-03-19 RX ORDER — GABAPENTIN 400 MG/1
1 CAPSULE ORAL
Qty: 0 | Refills: 0 | DISCHARGE

## 2021-03-19 RX ORDER — METOPROLOL TARTRATE 50 MG
1 TABLET ORAL
Qty: 0 | Refills: 0 | DISCHARGE

## 2021-03-19 RX ORDER — TAMSULOSIN HYDROCHLORIDE 0.4 MG/1
1 CAPSULE ORAL
Qty: 3 | Refills: 0
Start: 2021-03-19 | End: 2021-03-21

## 2021-03-19 RX ORDER — LOSARTAN POTASSIUM 100 MG/1
1 TABLET, FILM COATED ORAL
Qty: 0 | Refills: 0 | DISCHARGE

## 2021-03-19 RX ORDER — AMLODIPINE BESYLATE 2.5 MG/1
1 TABLET ORAL
Qty: 0 | Refills: 0 | DISCHARGE

## 2021-03-19 RX ORDER — TRAMADOL HYDROCHLORIDE 50 MG/1
1 TABLET ORAL
Qty: 20 | Refills: 0
Start: 2021-03-19 | End: 2021-03-23

## 2021-03-19 RX ORDER — SODIUM CHLORIDE 9 MG/ML
3 INJECTION INTRAMUSCULAR; INTRAVENOUS; SUBCUTANEOUS EVERY 8 HOURS
Refills: 0 | Status: DISCONTINUED | OUTPATIENT
Start: 2021-03-19 | End: 2021-03-19

## 2021-03-19 RX ORDER — OXYCODONE AND ACETAMINOPHEN 5; 325 MG/1; MG/1
1 TABLET ORAL ONCE
Refills: 0 | Status: DISCONTINUED | OUTPATIENT
Start: 2021-03-19 | End: 2021-03-19

## 2021-03-19 RX ORDER — TAMSULOSIN HYDROCHLORIDE 0.4 MG/1
0.4 CAPSULE ORAL ONCE
Refills: 0 | Status: COMPLETED | OUTPATIENT
Start: 2021-03-19 | End: 2021-03-19

## 2021-03-19 RX ADMIN — TAMSULOSIN HYDROCHLORIDE 0.4 MILLIGRAM(S): 0.4 CAPSULE ORAL at 20:32

## 2021-03-19 RX ADMIN — OXYCODONE AND ACETAMINOPHEN 1 TABLET(S): 5; 325 TABLET ORAL at 22:00

## 2021-03-19 NOTE — ASU PATIENT PROFILE, ADULT - PRO MENTAL HEALTH SX RECENT
Sheath #all: Closed using manual compression. Site secured by Tegaderm. Pressure held for: 10 minutes. none

## 2021-03-19 NOTE — ASU PATIENT PROFILE, ADULT - IS PATIENT PREGNANT?
Bed: 09  Expected date:   Expected time:   Means of arrival: Self  Comments:   not applicable (Male)
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
## 2021-03-19 NOTE — ASU DISCHARGE PLAN (ADULT/PEDIATRIC) - C. MAKE IMPORTANT PERSONAL OR BUSINESS DECISIONS
HEARING AID CHECK    AUDIOLOGY  2013 [employer] Pro mini [employer] aids with #2 85 speaker wire for the right and a #3 for the left with custom micromolds with canal locks.  Size 312 batteries  No Wax filters    Repair warranty until 2017  Replacement warranty until 2017  Services  2017  Supplies  2017    SUBJECTIVE:  Reason for visit: Annual check. Patient requested \"volume increase\" as she is having difficulty especially in crowds.     OBJECTIVE:  Services provided:   Otoscopy:  clear ear canals bilaterally  Cleaned Hearing Aid Parts: casing, microphone,  and earmold  Listening Check: both impressions: In good working order  Electroacoustic analysis: both impressions: Completed on ear speech mapping; results following reprogramming demonstrated satisfactory audibility for soft and average speech.   Significant unaided change: yes: Slight decrease across all frequencies in both ears.   Programming:  Both: Recalculated fit and made slight gain increases to improve audibility on speech mapping.     ASSESSMENT:  Aids in need of programming update due to change in hearing. Aids in good working order.     FOLLOW-UP:  Annually.   
Statement Selected

## 2021-03-19 NOTE — ASU PREOP CHECKLIST - BETA DATE TIME
Problem: Mobility Impaired (Adult and Pediatric)  Goal: *Acute Goals and Plan of Care (Insert Text)  Description: FUNCTIONAL STATUS PRIOR TO ADMISSION: Patient was independent and active without use of DME but poor history due to not being able to communicate. HOME SUPPORT PRIOR TO ADMISSION: Pt lives with family. Weekly re-assessment 5/19/2020  Goals remain appropriate. Physical Therapy Goals  1. Patient will move from supine to sit and sit to supine , scoot up and down and roll side to side in bed with moderate assistance x1 person within 7 day(s). 2.  Patient will sit EOB with supervision (occasional min A), x10 minutes with retracted shoulders, midline posture, and forward gaze within 7 days. 3.  Patient will verbalize and demonstrate 5 exercises she can complete on her own outside of therapy sessions with 7 days. 4.  Patient will participate in scooting to [facility] with control of core and max Ax2 for scooting while sitting EOB within 7 days. 5.  Patient will tolerate sitting in chair x1 hour within 7 days. Initiated 5/4/2020- All goals met 5/12/2020  1. Patient will move from supine to sit and sit to supine , scoot up and down and roll side to side in bed with maximal assistance within 7 day(s). 2.  Patient will sit EOB with moderate assistance of 2 people, x5 minutes with active core initiation to correct sitting balance within 7 days. 3.  Patient will verbalize 3 exercises she can complete on her own outside of therapy sessions with 7 days. 4.  Patient will participate in supine HEP (AAROM as needed) with min assist for AAROM within 7 days. 5.  Patient will tolerate bed in chair position 30 min BID within 7 days. Initiated 4/25/2020  1. Patient will move from supine to sit and sit to supine , scoot up and down and roll side to side in bed with moderate assistance once off vent within 7 day(s).     2.  Patient will participate in sitting with min A for 2 minutes once off vent within 7 day(s). 3.  Patient will perform active supine or sitting TE with min A within 7 day(s). Outcome: Progressing Towards Goal   PHYSICAL THERAPY TREATMENT  Patient: Jh Tilley (89 y.o. female)  Date: 5/20/2020  Diagnosis: COVID-19 virus infection [U07.1]   Sepsis with multi-organ dysfunction ([facility] [address]  Procedure(s) (LRB):  ESOPHAGOGASTRODUODENOSCOPY (EGD) (Left)  ESOPHAGOGASTRODUODENAL (EGD) BIOPSY (N/A) 8 Days Post-Op  Precautions: Fall  Chart, physical therapy assessment, plan of care and goals were reviewed. ASSESSMENT  Patient continues with skilled PT services and is progressing towards goals. Initially tried to get patient up with kris but deferred by  patient at this time due to fear of N/V. She is agreeable to attempt tomorrow. Did work on sitting EOB with much improved balance as well as tolerance sitting up for 11 minutes. Reviewed exercises and educated on how to increase reps. Continue to feel most appropriate for inpatient rehab and patient remains very motivated. Current Level of Function Impacting Discharge (mobility/balance): mod to max assist of 1-2; decreased balance    Other factors to consider for discharge:          PLAN :  Patient continues to benefit from skilled intervention to address the above impairments. Continue treatment per established plan of care. to address goals. Recommendation for discharge: (in order for the patient to meet his/her long term goals)  Therapy 3 hours per day 5-7 days per week    This discharge recommendation:  Has been made in collaboration with the attending provider and/or case management    IF patient discharges home will need the following DME: bedside commode, hospital bed, mechanical lift, transfer bench, and wheelchair       SUBJECTIVE:   Patient stated Larissa Ford made a lot of progress today.     OBJECTIVE DATA SUMMARY:   Critical Behavior:  Neurologic State: Alert  Orientation Level: Oriented X4  Cognition: Follows commands, Appropriate for age attention/concentration  Safety/Judgement: Awareness of environment  Functional Mobility Training:  Bed Mobility:  Rolling: Maximum assistance;Assist x1;Adaptive equipment; Additional time  Supine to Sit: Moderate assistance;Assist x2;Adaptive equipment; Additional time;Bed Modified  Sit to Supine: Moderate assistance;Assist x2;Bed Modified; Adaptive equipment; Additional time            Balance:  Sitting: Impaired; Without support  Sitting - Static: Fair (occasional)  Sitting - Dynamic: Poor (constant support)    Therapeutic Exercises:   Supine: ankle pumps, quad set, isometric glut      After treatment patient left in no apparent distress:   Supine in bed, Heels elevated for pressure relief, Call bell within reach, and Side rails x 3    COMMUNICATION/COLLABORATION:   The patients plan of care was discussed with: Occupational therapy assistant and Registered nurse.      Maki Wray, PT   Time Calculation: 34 mins 19-Mar-2021 06:30

## 2021-03-19 NOTE — ASU DISCHARGE PLAN (ADULT/PEDIATRIC) - ASU DC SPECIAL INSTRUCTIONSFT
- please use Tylenol ( 650 mg Po every 6 hours ) as needed for mild pain . Use Tramadol ( 50 mg every 6 hours ) as needed for moderate-severe pain     - Schedule follow up appointment in doctor's office in 2 weeks    - See above for dressing instruction . Please remember the Sterile Strip needs to be left in place and they will fall on their own .

## 2021-03-19 NOTE — ASU PATIENT PROFILE, ADULT - PMH
Afib    Heartburn    History of bilateral inguinal hernias    Hyperlipidemia    Hypertension    ZEYAD (obstructive sleep apnea)

## 2021-03-19 NOTE — ASU DISCHARGE PLAN (ADULT/PEDIATRIC) - CARE PROVIDER_API CALL
Rad Baird (MD)  Surgery  [address] [place], [place] [id number]  Phone: [phone]  Fax: [phone]  Follow Up Time: 2 weeks

## 2021-03-22 ENCOUNTER — APPOINTMENT (OUTPATIENT)
Dept: SURGERY | Facility: CLINIC | Age: 66
End: 2021-03-22
Payer: COMMERCIAL

## 2021-03-22 PROCEDURE — 99024 POSTOP FOLLOW-UP VISIT: CPT

## 2021-03-22 NOTE — PLAN
[FreeTextEntry1] : urinary cath removed \par Post operative wound care, activity and restrictions/precautions were reinforced. \par Patient was instructed to refrain from any heavy lifting >10-15 lbs for at least 4 weeks post operatively. \par \par Patient's questions and concerns addressed.\par \par patient will follow up if needed. Warning signs, follow up, and restrictions were discussed with the patient.\par \par

## 2021-03-22 NOTE — HISTORY OF PRESENT ILLNESS
[de-identified] : MARIA EUGENIA HERNANDEZ presents to the office for postoperative visit today, he is S/P Repair of recurrent incarcerated left inguinal hernia 03/19/21. Patient states he's feeling some soreness to the incision site. No fevers/chills. He had a urinary catheter placed in the hospital after procedure.

## 2021-03-22 NOTE — REVIEW OF SYSTEMS
[Fever] : no fever [Chills] : no chills [Feeling Poorly] : not feeling poorly [Chest Pain] : no chest pain [Lower Ext Edema] : no extremity edema [Shortness Of Breath] : no shortness of breath [Cough] : no cough [Abdominal Pain] : no abdominal pain [Skin Lesions] : no skin lesions [Skin Wound] : no skin wound [Dizziness] : no dizziness [Anxiety] : no anxiety [Muscle Weakness] : no muscle weakness [Swollen Glands] : no swollen glands [FreeTextEntry8] : has a urinary cath in place, after surgery

## 2021-03-22 NOTE — PHYSICAL EXAM
[Normal Breath Sounds] : Normal breath sounds [Normal Rate and Rhythm] : normal rate and rhythm [No Rash or Lesion] : No rash or lesion [Alert] : alert [Oriented to Person] : oriented to person [Oriented to Place] : oriented to place [Oriented to Time] : oriented to time [Calm] : calm [JVD] : no jugular venous distention  [de-identified] : A/Ox3; NAD. appears comfortable [de-identified] : EOMI; sclera anicteric. Nasal mucosa pink, septum midline. Oral mucosa pink. Tongue midline, Pharynx without exudates. [de-identified] : abd is soft, NT/ND\par  [de-identified] : Wound healing well with no hematoma, erythema or drainage; No evidence of infection. Urinary Catheter intact \par \par  [de-identified] : +ROM, no joint swelling

## 2021-03-22 NOTE — REASON FOR VISIT
[Post Op: _________] : a [unfilled] post op visit [FreeTextEntry1] : S/P Repair of recurrent incarcerated left inguinal hernia 03/19/21

## 2021-03-22 NOTE — ASSESSMENT
[FreeTextEntry1] : MARIA EUGENIA HERNANDEZ is a 64 y/o M,  S/P Repair of recurrent incarcerated left inguinal hernia 03/19/21. Patient states he has some tenderness to incision site and discomfort w catheter. No fevers/chills. \par \par Incision site is healing well and as expected. There is no evidence of infection/complication, and patient is progressing as expected. Post-operative wound care, activities, restrictions and precautions were reinforced. \par

## 2021-03-24 LAB — SURGICAL PATHOLOGY STUDY: SIGNIFICANT CHANGE UP

## 2021-03-29 ENCOUNTER — APPOINTMENT (OUTPATIENT)
Dept: SURGERY | Facility: CLINIC | Age: 66
End: 2021-03-29
Payer: COMMERCIAL

## 2021-03-29 VITALS — TEMPERATURE: 96.3 F

## 2021-03-29 PROCEDURE — 99024 POSTOP FOLLOW-UP VISIT: CPT

## 2021-03-29 NOTE — ASSESSMENT
[FreeTextEntry1] : MARIA EUGENIA DAVID S/P Repair of recurrent incarcerated left inguinal hernia 03/19/21. Patient is here for wound check, stating he has some tenderness to the area. \par \par Incision site is healing well and as expected. There is no evidence of infection/complication, and patient is progressing as expected. Post-operative wound care, activities, restrictions and precautions were reinforced.

## 2021-03-29 NOTE — HISTORY OF PRESENT ILLNESS
[de-identified] : MARIA EUGENIA HERNANDEZ presents to the office for postoperative visit today, he is S/P Repair of recurrent incarcerated left inguinal hernia 03/19/21. Patient is here for wound check, stating he has some tenderness to the area. No urinary complains. No swelling to the area.

## 2021-03-29 NOTE — PHYSICAL EXAM
[Normal Breath Sounds] : Normal breath sounds [Normal Rate and Rhythm] : normal rate and rhythm [No Rash or Lesion] : No rash or lesion [Alert] : alert [Oriented to Person] : oriented to person [Oriented to Place] : oriented to place [Oriented to Time] : oriented to time [Calm] : calm [JVD] : no jugular venous distention  [de-identified] : A/Ox3; NAD. appears comfortable [de-identified] : EOMI; sclera anicteric. Nasal mucosa pink, septum midline. Oral mucosa pink. Tongue midline, Pharynx without exudates. [de-identified] : abd is soft, NT/ND\par  [de-identified] : well healed incision site, no hematoma, erythema or drainage; No evidence of infection.  \par \par  [de-identified] : +ROM, no joint swelling

## 2021-04-08 ENCOUNTER — APPOINTMENT (OUTPATIENT)
Dept: UROLOGY | Facility: CLINIC | Age: 66
End: 2021-04-08
Payer: COMMERCIAL

## 2021-04-08 PROCEDURE — 99072 ADDL SUPL MATRL&STAF TM PHE: CPT

## 2021-04-08 PROCEDURE — 99214 OFFICE O/P EST MOD 30 MIN: CPT

## 2021-04-08 NOTE — HISTORY OF PRESENT ILLNESS
[FreeTextEntry1] : cc retetnion \par pt underwent left hernia repair postop had szymanski placed \par removed by dr byrd in office \par now cvoiding well\par c/o ed last few years \par

## 2021-04-08 NOTE — ASSESSMENT
[FreeTextEntry1] : pvr minimal \par will cont to observe \par \par Will try sildenafil side effects reviewed\par More common reviewed- redness or warmth in your face, neck, or chest; cold symptoms such as stuffy nose, sneezing, or sore throat; headache; memory problems; diarrhea, upset stomach; or muscle pain, back pain.\par Stop immediately and got to ER for changes in vision or sudden vision loss; ringing in your ears, or sudden hearing loss; chest pain or heavy feeling, pain spreading to the arm or shoulder, nausea, sweating, general ill feeling; irregular heartbeat; shortness of breath, swelling in your hands or feet; seizure (convulsions); feeling light-headed, fainting; or penis erection that is painful or lasts 4 hours or longer\par \par \par recent cath and surgery \par will f/u 1 month and repeat

## 2021-04-27 ENCOUNTER — FORM ENCOUNTER (OUTPATIENT)
Age: 66
End: 2021-04-27

## 2021-04-28 ENCOUNTER — APPOINTMENT (OUTPATIENT)
Dept: GASTROENTEROLOGY | Facility: CLINIC | Age: 66
End: 2021-04-28
Payer: COMMERCIAL

## 2021-04-28 VITALS
HEIGHT: 78 IN | TEMPERATURE: 96.8 F | DIASTOLIC BLOOD PRESSURE: 95 MMHG | WEIGHT: 269 LBS | BODY MASS INDEX: 31.12 KG/M2 | OXYGEN SATURATION: 100 % | SYSTOLIC BLOOD PRESSURE: 183 MMHG | HEART RATE: 71 BPM

## 2021-04-28 PROCEDURE — 99214 OFFICE O/P EST MOD 30 MIN: CPT

## 2021-04-28 NOTE — ASSESSMENT
[FreeTextEntry1] : Abdominal Pain: The patient complains of abdominal pain. The patient is to avoid nonsteroidal anti-inflammatory drugs and aspirin. I recommend continue on a trial of Pantoprazole 40 mg once a day for 3 months for the symptoms.  \par Dyspepsia: The patient complains of dyspeptic symptoms.  The patient was advised to abide by an anti-gas diet.  The patient was given a pamphlet for anti-gas.  The patient and I reviewed the anti-gas diet at length. The patient is to start on a trial of Phazyme one tablet 3 times a day p.r.n. abdominal pain and gas.\par GERD: The patient was advised to avoid late-night meals and dietary indiscretions.  The patient was advised to avoid fried and fatty foods.  The patient was advised to abide by an anti-GERD diet. The patient was given a pamphlet for anti-GERD.  The patient and I reviewed the anti-GERD diet at length. I recommend continue on a trial of Pantoprazole 40 mg once a day x 3 months for the symptoms.\par Gastritis: The patient has a history of gastritis. The patient is to avoid nonsteroidal anti-inflammatory drugs and aspirin. I recommend a trial of pantoprazole 40 mg once a day for 3 months for the symptoms. \par Intestinal Metaplasia of the Stomach:  The patient was found to have intestinal metaplasia of the stomach on recent upper endoscopy.  The findings of intestinal metaplasia of the stomach have an increased risk of gastric cancer.  Recent biopsies did not reveal dysplasia.  I recommend a repeat upper endoscopy with mapping in 3 years to reassess for intestinal metaplasia and dysplasia unless symptomatic.  The patient is aware of the increased risk of intestinal metaplasia and progression to stomach cancer.  The patient agrees to follow-up.\par Prostate discomfort: I recommend evaluation with urologist to assess his prostate.\par Follow-up: The patient is to follow-up in the office in 6 months to reassess the symptoms. The patient was told to call the office if any further problems. \par \par \par \par
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
## 2021-05-10 ENCOUNTER — APPOINTMENT (OUTPATIENT)
Dept: INTERNAL MEDICINE | Facility: CLINIC | Age: 66
End: 2021-05-10

## 2021-05-12 ENCOUNTER — APPOINTMENT (OUTPATIENT)
Dept: UROLOGY | Facility: CLINIC | Age: 66
End: 2021-05-12

## 2021-07-10 NOTE — H&P ADULT - ASSESSMENT
64 year old male found to have hypertensive emergency.   called Dr. Lesley Romero office pt was seen last time in Nov 2019 found to have S. blood pressure was >200, non complaint to medications.   as per Nov 2019 pt was supposed to be on amlodipine 10mg, lipitor 20mng, Eliquis 5mg bid, hydralazine 25 bid, losartan 100mg od, metoprolol 50mg od  tried to called Dr. Acuna but Dr. Acuna is not available     Assessment:     Hypertensive emergency   Afib  HLD  GERD    Plan:   likely from non complaint   s/p labetalol 20mg ivpush  will give vasotec 1.25 and amlodipine 10mg stat   monitor blood pressure closely   goal sbp around 180 over next 24 hours   f/u ecg  f/u echo   f/u trop   will do secondary htn r/o   cardiology consulted Dr. Orta        Afib:   CHADVasc score 1 for htn   will hold AC for now given uncontrolled htn    rate controlled currently     HLD   resume lipitor     GERD:   c/w ppi   Dr. Portillo will plan for EGD on Thursday if bp is stable.    PPX:   HSQ dvt ppx   PPI for gi ppx       64 year old male found to have hypertensive emergency.   called Dr. Lesley Romero office pt was seen last time in Nov 2019 found to have S. blood pressure was >200, non complaint to medications.   as per Nov 2019 pt was supposed to be on amlodipine 10mg, lipitor 20mng, Eliquis 5mg bid, hydralazine 25 bid, losartan 100mg od, metoprolol 50mg od  tried to called Dr. Acuna but Dr. Acuna is not available     Assessment:     Hypertensive emergency   Afib  HLD  GERD    Plan:   likely from non complaint   s/p labetalol 20mg ivpush  will give vasotec 1.25 and amlodipine 10mg stat   monitor blood pressure closely   goal sbp around 180 over next 24 hours   f/u ecg  f/u echo   f/u trop   will consider secondary htn work if pt is found to have malignant htn after starting bp meds   cardiology consulted Dr. Orta        Afib:   CHADVasc score 1 for htn   will hold AC for now given uncontrolled htn    rate controlled currently     HLD   resume lipitor     GERD:   c/w ppi   Dr. Portillo will plan for EGD on Thursday if bp is stable.    CKD:   Likely from HTN   baseline cr 1.4  monitor Cr   BMP daily     PPX:   HSQ dvt ppx   PPI for gi ppx       65 year old male found to have hypertensive emergency.   called Dr. Lesley Romero office pt was seen last time in Nov 2019 found to have S. blood pressure was >200, non complaint to medications.   as per Nov 2019 pt was supposed to be on amlodipine 10mg, lipitor 20mng, Eliquis 5mg bid, hydralazine 25 bid, losartan 100mg od, metoprolol 50mg od  tried to called Dr. Acuna but Dr. Acuna is not available     Assessment:     Hypertensive emergency   Afib  HLD  GERD    Plan:   likely from non complaint   s/p labetalol 20mg ivpush  will give vasotec 1.25 and amlodipine 10mg stat   monitor blood pressure closely   goal sbp around 180 over next 24 hours   f/u ecg  f/u echo   f/u trop   will consider secondary htn work if pt is found to have malignant htn after starting bp meds   cardiology consulted Dr. Orta        Afib:   CHADVasc score 1 for htn   will hold AC for now given uncontrolled htn    rate controlled currently     HLD   resume lipitor     GERD:   c/w ppi   Dr. Portillo will plan for EGD on Thursday if bp is stable.    CKD:   Likely from HTN   baseline cr 1.4  monitor Cr   BMP daily     PPX:   HSQ dvt ppx   PPI for gi ppx       0

## 2021-07-26 ENCOUNTER — NON-APPOINTMENT (OUTPATIENT)
Age: 66
End: 2021-07-26

## 2021-07-27 ENCOUNTER — APPOINTMENT (OUTPATIENT)
Dept: INTERNAL MEDICINE | Facility: CLINIC | Age: 66
End: 2021-07-27
Payer: COMMERCIAL

## 2021-07-27 VITALS
OXYGEN SATURATION: 97 % | SYSTOLIC BLOOD PRESSURE: 165 MMHG | WEIGHT: 277 LBS | HEART RATE: 76 BPM | BODY MASS INDEX: 32.05 KG/M2 | DIASTOLIC BLOOD PRESSURE: 100 MMHG | HEIGHT: 78 IN

## 2021-07-27 DIAGNOSIS — M54.31 SCIATICA, RIGHT SIDE: ICD-10-CM

## 2021-07-27 DIAGNOSIS — H92.03 OTALGIA, BILATERAL: ICD-10-CM

## 2021-07-27 DIAGNOSIS — H66.90 OTITIS MEDIA, UNSPECIFIED, UNSPECIFIED EAR: ICD-10-CM

## 2021-07-27 PROCEDURE — 99213 OFFICE O/P EST LOW 20 MIN: CPT

## 2021-07-27 RX ORDER — AMOXICILLIN 875 MG/1
875 TABLET, FILM COATED ORAL
Qty: 14 | Refills: 0 | Status: COMPLETED | COMMUNITY
Start: 2021-07-27 | End: 2021-08-03

## 2021-07-28 NOTE — ASSESSMENT
[FreeTextEntry1] : 1.  Complaining of bilateral ear pain with mild erythema noted on the left TM.  Will start a course of amoxicillin.  If no improvement can see ENT.\par 2.  Hypertension with improved blood pressures but still above goal.  We will increase the metoprolol to 100 mg daily.  A printed medication list was given to patient along with instructions for the change.\par 3.  Sciatica of the right side with probable radiculopathy.  Will refer to physical therapy and obtain some low back x-rays.\par 4.  Follow-up in 6 to 8 weeks or as needed

## 2021-07-28 NOTE — HISTORY OF PRESENT ILLNESS
[FreeTextEntry8] : C/O b/l ear pain x 2 weeks and LBP x 3 weeks.\par \par BP has been good at home - 140/85 today and 140/80 yesterday in the morning and at noon it was 160/85.  Still not sure what meds he is supposed to be taking and did not bring in his medications today.  tells me that he is doing OK except for the back pain.  States the pain radiates down the right leg.

## 2021-08-09 ENCOUNTER — RX RENEWAL (OUTPATIENT)
Age: 66
End: 2021-08-09

## 2021-08-11 ENCOUNTER — FORM ENCOUNTER (OUTPATIENT)
Age: 66
End: 2021-08-11

## 2021-08-11 ENCOUNTER — APPOINTMENT (OUTPATIENT)
Dept: GASTROENTEROLOGY | Facility: CLINIC | Age: 66
End: 2021-08-11
Payer: COMMERCIAL

## 2021-08-11 ENCOUNTER — RX RENEWAL (OUTPATIENT)
Age: 66
End: 2021-08-11

## 2021-08-11 VITALS
OXYGEN SATURATION: 97 % | TEMPERATURE: 98.1 F | BODY MASS INDEX: 32.17 KG/M2 | HEIGHT: 78 IN | WEIGHT: 278 LBS | DIASTOLIC BLOOD PRESSURE: 109 MMHG | SYSTOLIC BLOOD PRESSURE: 215 MMHG | HEART RATE: 79 BPM

## 2021-08-11 PROCEDURE — 99214 OFFICE O/P EST MOD 30 MIN: CPT

## 2021-08-11 NOTE — ASSESSMENT
[FreeTextEntry1] : Dyspepsia: The patient complains of dyspeptic symptoms.  The patient was advised to abide by an anti-gas diet.  The patient was given a pamphlet for anti-gas.  The patient and I reviewed the anti-gas diet at length. The patient is to start on a trial of Phazyme one tablet 3 times a day p.r.n. abdominal pain and gas.\par GERD: The patient was advised to avoid late-night meals and dietary indiscretions.  The patient was advised to avoid fried and fatty foods.  The patient was advised to abide by an anti-GERD diet. The patient was previously given a pamphlet for anti-GERD.  The patient and I reviewed the anti-GERD diet at length. I recommend continue on a trial of Pantoprazole 40 mg once a day x 3 months for the symptoms.\par Gastritis: The patient has a history of gastritis. The patient is to avoid nonsteroidal anti-inflammatory drugs and aspirin. I recommend a trial of pantoprazole 40 mg once a day. \par Intestinal Metaplasia of the Stomach: The patient was found to have intestinal metaplasia of the stomach on recent upper endoscopy. The findings of intestinal metaplasia of the stomach have an increased risk of gastric cancer. Recent biopsies did not reveal dysplasia. I recommend a repeat upper endoscopy with mapping in 2 years to reassess for intestinal metaplasia and dysplasia unless symptomatic. The patient is aware of the increased risk of intestinal metaplasia and progression to stomach cancer. The patient agrees to follow-up.\par Prostate discomfort: I recommend evaluation with urologist to assess his prostate.\par Follow-up: The patient is to follow-up in the office in 6 months to reassess the symptoms. The patient was told to call the office if any further problems. \par \par
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
## 2021-08-27 ENCOUNTER — APPOINTMENT (OUTPATIENT)
Dept: OTOLARYNGOLOGY | Facility: CLINIC | Age: 66
End: 2021-08-27
Payer: COMMERCIAL

## 2021-08-27 VITALS
DIASTOLIC BLOOD PRESSURE: 104 MMHG | BODY MASS INDEX: 32.82 KG/M2 | SYSTOLIC BLOOD PRESSURE: 200 MMHG | WEIGHT: 278 LBS | HEART RATE: 49 BPM | HEIGHT: 77 IN | TEMPERATURE: 98 F

## 2021-08-27 DIAGNOSIS — H61.23 IMPACTED CERUMEN, BILATERAL: ICD-10-CM

## 2021-08-27 DIAGNOSIS — H90.3 SENSORINEURAL HEARING LOSS, BILATERAL: ICD-10-CM

## 2021-08-27 DIAGNOSIS — H93.13 TINNITUS, BILATERAL: ICD-10-CM

## 2021-08-27 PROCEDURE — 92557 COMPREHENSIVE HEARING TEST: CPT

## 2021-08-27 PROCEDURE — 99213 OFFICE O/P EST LOW 20 MIN: CPT | Mod: 25

## 2021-08-27 PROCEDURE — G0268 REMOVAL OF IMPACTED WAX MD: CPT

## 2021-08-27 PROCEDURE — 92567 TYMPANOMETRY: CPT

## 2021-08-27 PROCEDURE — 92588 EVOKED AUDITORY TST COMPLETE: CPT

## 2021-08-28 NOTE — PHYSICAL EXAM
[Midline] : trachea located in midline position [Normal] : no rashes [de-identified] : b/l pamelan

## 2021-08-28 NOTE — ASSESSMENT
[FreeTextEntry1] : Tinnitus and hearing loss:\par - Audiogram today\par bilateral sensorineural hearing loss-cleared for hearing aids\par avoid loud noises\par stress redxn\par \par Cerumen:\par - Removed in office today\par wax-\par After informed verbal consent is obtained, cerumen is removed from the right and left  ear canal with a curette and suction.\par Rx: \par Debrox was prescribed and  is to be placed in both ears on a routine basis to keep them free of wax.\par Routine debridement suggested to keep the ears free of wax.\par \par HTN\par - F/U with PMD\par

## 2021-08-28 NOTE — HISTORY OF PRESENT ILLNESS
[de-identified] : 64 y/o M with b/l tinnitus.  Feels hearing is muffled.  No pain, no d/c.  No Vertigo.  No nasal congestion.  Pos loud noise exposure at work.

## 2021-08-28 NOTE — DATA REVIEWED
[de-identified] : Hearing Test performed to evaluate the extent of hearing loss and  to explain pt's symptoms\par today's hearing test was personally reviewed and revealed\par Moderate essentially sensorineural hearing loss, bilaterally.

## 2021-08-28 NOTE — CONSULT LETTER
[Dear  ___] : Dear  [unfilled], [Consult Letter:] : I had the pleasure of evaluating your patient, [unfilled]. [Please see my note below.] : Please see my note below. [Consult Closing:] : Thank you very much for allowing me to participate in the care of this patient.  If you have any questions, please do not hesitate to contact me. [Sincerely,] : Sincerely, [FreeTextEntry3] : Carlitos Conner MD

## 2021-09-03 ENCOUNTER — NON-APPOINTMENT (OUTPATIENT)
Age: 66
End: 2021-09-03

## 2021-09-03 ENCOUNTER — APPOINTMENT (OUTPATIENT)
Dept: OTHER | Facility: CLINIC | Age: 66
End: 2021-09-03
Payer: COMMERCIAL

## 2021-09-03 PROCEDURE — 99443: CPT | Mod: 95

## 2021-09-08 ENCOUNTER — NON-APPOINTMENT (OUTPATIENT)
Age: 66
End: 2021-09-08

## 2021-09-09 ENCOUNTER — RX RENEWAL (OUTPATIENT)
Age: 66
End: 2021-09-09

## 2021-09-10 ENCOUNTER — APPOINTMENT (OUTPATIENT)
Dept: UROLOGY | Facility: CLINIC | Age: 66
End: 2021-09-10
Payer: COMMERCIAL

## 2021-09-10 PROCEDURE — 99214 OFFICE O/P EST MOD 30 MIN: CPT

## 2021-09-13 ENCOUNTER — RX RENEWAL (OUTPATIENT)
Age: 66
End: 2021-09-13

## 2021-09-14 LAB
ANION GAP SERPL CALC-SCNC: 13 MMOL/L
BUN SERPL-MCNC: 15 MG/DL
CALCIUM SERPL-MCNC: 9.4 MG/DL
CHLORIDE SERPL-SCNC: 102 MMOL/L
CO2 SERPL-SCNC: 23 MMOL/L
CREAT SERPL-MCNC: 1.35 MG/DL
GLUCOSE SERPL-MCNC: 101 MG/DL
POTASSIUM SERPL-SCNC: 3.6 MMOL/L
PSA SERPL-MCNC: 5.14 NG/ML
SODIUM SERPL-SCNC: 137 MMOL/L

## 2021-09-14 NOTE — HISTORY OF PRESENT ILLNESS
[FreeTextEntry1] : cc f/u retention /psa\par pt underwent left hernia repair postop had szymanski placed \par removed by dr byrd in office \par now cvoiding well\par c/o ed last few years \par psa was high but after cath\par

## 2021-09-14 NOTE — ASSESSMENT
[FreeTextEntry1] : pvr low \par \par cont sildenafil \par \par \par check psa \par if still high will do mri

## 2021-10-17 ENCOUNTER — FORM ENCOUNTER (OUTPATIENT)
Age: 66
End: 2021-10-17

## 2021-10-23 ENCOUNTER — APPOINTMENT (OUTPATIENT)
Dept: MRI IMAGING | Facility: IMAGING CENTER | Age: 66
End: 2021-10-23
Payer: COMMERCIAL

## 2021-10-23 ENCOUNTER — OUTPATIENT (OUTPATIENT)
Dept: OUTPATIENT SERVICES | Facility: HOSPITAL | Age: 66
LOS: 1 days | End: 2021-10-23
Payer: COMMERCIAL

## 2021-10-23 ENCOUNTER — RESULT REVIEW (OUTPATIENT)
Age: 66
End: 2021-10-23

## 2021-10-23 DIAGNOSIS — T14.8 OTHER INJURY OF UNSPECIFIED BODY REGION: Chronic | ICD-10-CM

## 2021-10-23 DIAGNOSIS — Z98.890 OTHER SPECIFIED POSTPROCEDURAL STATES: Chronic | ICD-10-CM

## 2021-10-23 DIAGNOSIS — R97.20 ELEVATED PROSTATE SPECIFIC ANTIGEN [PSA]: ICD-10-CM

## 2021-10-23 PROCEDURE — A9585: CPT

## 2021-10-23 PROCEDURE — 76498P: CUSTOM | Mod: 26

## 2021-10-23 PROCEDURE — 72197 MRI PELVIS W/O & W/DYE: CPT | Mod: 26

## 2021-10-23 PROCEDURE — 72197 MRI PELVIS W/O & W/DYE: CPT

## 2021-10-23 PROCEDURE — 76498 UNLISTED MR PROCEDURE: CPT

## 2021-10-27 ENCOUNTER — APPOINTMENT (OUTPATIENT)
Dept: INTERNAL MEDICINE | Facility: CLINIC | Age: 66
End: 2021-10-27
Payer: COMMERCIAL

## 2021-10-27 VITALS
RESPIRATION RATE: 16 BRPM | OXYGEN SATURATION: 98 % | HEART RATE: 53 BPM | HEIGHT: 77 IN | WEIGHT: 278 LBS | SYSTOLIC BLOOD PRESSURE: 206 MMHG | DIASTOLIC BLOOD PRESSURE: 120 MMHG | BODY MASS INDEX: 32.82 KG/M2

## 2021-10-27 VITALS — DIASTOLIC BLOOD PRESSURE: 110 MMHG | SYSTOLIC BLOOD PRESSURE: 210 MMHG

## 2021-10-27 DIAGNOSIS — K40.90 UNILATERAL INGUINAL HERNIA, W/OUT OBSTRUCTION OR GANGRENE, NOT SPECIFIED AS RECURRENT: ICD-10-CM

## 2021-10-27 DIAGNOSIS — R68.83 CHILLS (WITHOUT FEVER): ICD-10-CM

## 2021-10-27 LAB
GLUCOSE BLDC GLUCOMTR-MCNC: 104
HBA1C MFR BLD HPLC: 6.2

## 2021-10-27 PROCEDURE — 82962 GLUCOSE BLOOD TEST: CPT

## 2021-10-27 PROCEDURE — G0442 ANNUAL ALCOHOL SCREEN 15 MIN: CPT

## 2021-10-27 PROCEDURE — 83036 HEMOGLOBIN GLYCOSYLATED A1C: CPT | Mod: QW

## 2021-10-27 PROCEDURE — 99396 PREV VISIT EST AGE 40-64: CPT | Mod: 25

## 2021-10-27 PROCEDURE — G0444 DEPRESSION SCREEN ANNUAL: CPT | Mod: 59

## 2021-10-27 RX ORDER — PANTOPRAZOLE 40 MG/1
40 TABLET, DELAYED RELEASE ORAL DAILY
Qty: 30 | Refills: 0 | Status: DISCONTINUED | COMMUNITY
Start: 2021-04-28 | End: 2021-10-27

## 2021-10-27 RX ORDER — PANTOPRAZOLE 40 MG/1
40 TABLET, DELAYED RELEASE ORAL
Qty: 30 | Refills: 3 | Status: DISCONTINUED | COMMUNITY
Start: 2021-01-26 | End: 2021-10-27

## 2021-10-27 RX ORDER — ACETAMINOPHEN 325 MG/1
325 TABLET ORAL
Qty: 0 | Refills: 0 | Status: COMPLETED | OUTPATIENT
Start: 2021-10-27

## 2021-10-27 RX ORDER — CYCLOBENZAPRINE HYDROCHLORIDE 10 MG/1
10 TABLET, FILM COATED ORAL
Qty: 30 | Refills: 1 | Status: DISCONTINUED | COMMUNITY
Start: 2020-09-16 | End: 2021-10-27

## 2021-10-27 RX ORDER — CHOLECALCIFEROL (VITAMIN D3) 50 MCG
50 MCG TABLET ORAL
Qty: 90 | Refills: 3 | Status: DISCONTINUED | COMMUNITY
Start: 2021-10-27 | End: 2021-10-27

## 2021-10-27 RX ADMIN — ACETAMINOPHEN 0 MG: 325 TABLET ORAL at 00:00

## 2021-10-27 NOTE — ASSESSMENT
[FreeTextEntry1] : # A-fib\par non compliant to medication including Eliquis and multiple BP med. \par Discussed with patient on target organ damange including stroke, heart attack, blindness, kidney failure due to the poorly controlled BP and his current 10 yr ASCVD  risk was extremely high as 50%. \par rate was  55-60s. \par Cardiology recommended him to take metoprolol 100mg po qd and Eliquis 5mg po bid but pt seemed with low health literacy. \par Reminded him to f/u with Dr. Elder. Last visit was 3/4/21. ECG on the day was A-fib, HR 70, possilble LVH.\par Pt denied CP, SOB, dizziness, vision changes. \par \par # HTN\par BP is uncontrolled as 206/120, 210/110 secondary to medication non-compliance. \par He said he did not take medications since yesterday morning due to COVID vaccination and later said he did not take multiple medications since they ran out of. Sent refill to his pharmacy today. He promised to take meds as directed and make home BP log then RTC in 2 weeks. I printed out his current medication list for him. \par continue Low sodium diet, take medications every day and regularly exercise\par Continue take chlorthalidone 25mg qd, olmesartan 40mg qd, hydralazine 50mg tid, Metoprolol 100mg qd amlodipine 10mg qd.\par Compliance emphasized and risk of stroke, dialysis and MI discussed again.\par \par # HLD\par Did not report muscle pain at this time and not sure about medication compliance. \par Continue take Atorvastatin 20mg po qd and low fat diet.\par Check lipid panel .\par \par # T2DM\par POCT HbA1C 6.2%   Fingerstick  104 today.\par Diet controlled.\par Well-controlled at this time. Denied polyuria and polydipsia.\par To continue to check sugars as directed, low fat low cholesterol, ADA diet, weight loss and regular daily exercise.\par Reminded of the need for a yearly dilated eye exam. Foot care and daily inspection of feet reiterated.\par Referred to our ophthalmologist for retina exam. \par \par # GERD\par stable. tolerating food without issues.\par - on protonix 40mg qd and pepcid 20mg po qd prn.\par \par # Health maintenance\par -Colonoscopy:  ?11/18 done with some polyps.  Pt was told it's the due. Pt will f/u with his current GI.\par \par -COVID vaccine: received 1st Pfizer vaccine yesterday.\par -Flu vaccine: offered but declined.\par -Tdap vaccine:offered but declined.\par -Pneumovax:offered but declined.\par -Shingrix: offered but declined.\par \par To draw lab as planned.\par Follow up 2 weeks later for uncontrolled BP.\par Pt should go to ED if he develops CP, headache, SOB, leg swelling, fever. Pt expressed understood. \par

## 2021-10-27 NOTE — END OF VISIT
[FreeTextEntry3] : Patient seen and examined in conjunction with Radha Russell. NP acting as practitioner and scribe.  I agree with the history and plan as outlined with modifications documented as appropriate.\par

## 2021-10-27 NOTE — HISTORY OF PRESENT ILLNESS
[FreeTextEntry1] : CPE [de-identified] : MARIA EUGENIA HERNANDEZ  is a 65 year old male  with history of DM, HTN, hyperlipidemia and a-fib presented today for comprehensive evaluation.\par RE lifestyle: stable. Working for [employer] in evening shift.  Lives with wife. 4 Children have their own families. \par RE GI: After left inguinal hernia surgery in March, he felt okay. Eating 2 meals including breakfast and dinner a day and sometimes lunch. \par RE COVID: so far no infection . Got Pfizer vaccine yesterday for his job made it mandatory. He c/o chills and body ache today. Tylenol was given. MD note for excusing from work for today was given. \par RE CV: denied CP, SOB, dizziness, vision change  but acknowledged that he did not take heart medications including eliquis and BP medications for 1-2 months. Hx of heavy smoking 2 packs daily for over 20 years. Quit smoking 15 years ago. \par RE ear: chronic tinnitus. ear wax was removed by JOSESITO 2 months ago.  Stable.\par RE Uro: seen by Dr. Monte on 9/14/21 for elevated PSA 5.14 after Tapia cath removal. Took MRI pelvis a few days ago and will f/u. Denied hematuria, retention, worsening of ED. \par \par Denies headache, dizziness.\par Denies rash, fatigue, fever, weight loss, anorexia.\par Denies cough, wheezing.\par Denies CP, SOB, WHITESIDE, edema, palpitations.\par Denies abdominal pain, N/V/D/C. Denies BRBPR, melena, dysphagia.\par Denies dysuria, frequency, hematuria, hesitancy.\par Denies weakness, numbness, gait instability.\par

## 2021-10-27 NOTE — HEALTH RISK ASSESSMENT
[Good] : ~his/her~  mood as  good [Yes] : Yes [Monthly or less (1 pt)] : Monthly or less (1 point) [1 or 2 (0 pts)] : 1 or 2 (0 points) [Less than monthly (1 pt)] : Less than monthly (1 point) [No] : In the past 12 months have you used drugs other than those required for medical reasons? No [No falls in past year] : Patient reported no falls in the past year [0] : 1) Little interest or pleasure doing things: Not at all (0) [1] : 2) Feeling down, depressed, or hopeless for several days (1) [PHQ-2 Negative - No further assessment needed] : PHQ-2 Negative - No further assessment needed [Patient reported colonoscopy was abnormal] : Patient reported colonoscopy was abnormal [No Retinopathy] : No retinopathy [Health Literacy] : health literacy [With Family] : lives with family [Employed] : employed [] :  [Sexually Active] : sexually active [Feels Safe at Home] : Feels safe at home [Fully functional (bathing, dressing, toileting, transferring, walking, feeding)] : Fully functional (bathing, dressing, toileting, transferring, walking, feeding) [Fully functional (using the telephone, shopping, preparing meals, housekeeping, doing laundry, using] : Fully functional and needs no help or supervision to perform IADLs (using the telephone, shopping, preparing meals, housekeeping, doing laundry, using transportation, managing medications and managing finances) [Patient/Caregiver not ready to engage] : , patient/caregiver not ready to engage [] : No [de-identified] : 2packs a dayX 20years [YearQuit] : 2005 [de-identified] : bear, starch, sake [Audit-CScore] : 2 [de-identified] : basketball, swimming, walking hiking, fishing [de-identified] : cut down salt, sweet [CMP6Rromj] : 1 [EyeExamDate] : 1 [Change in mental status noted] : No change in mental status noted [Language] : denies difficulty with language [High Risk Behavior] : no high risk behavior [Reports changes in hearing] : Reports no changes in hearing [Reports changes in vision] : Reports no changes in vision [Reports changes in dental health] : Reports no changes in dental health [ColonoscopyDate] : 11/18 [ColonoscopyComments] : some polyps noted. to f/u with current GI Dr. 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 SN[facility], pt did not want hearing aids [de-identified] : seen by ophthalmologist long time ago [de-identified] : teeth broken recently. to see dentist [AdvancecareDate] : 10/21

## 2021-10-27 NOTE — PHYSICAL EXAM
[No Acute Distress] : no acute distress [Well Nourished] : well nourished [Normal Sclera/Conjunctiva] : normal sclera/conjunctiva [PERRL] : pupils equal round and reactive to light [EOMI] : extraocular movements intact [Normal Outer Ear/Nose] : the outer ears and nose were normal in appearance [Normal Oropharynx] : the oropharynx was normal [No JVD] : no jugular venous distention [No Lymphadenopathy] : no lymphadenopathy [Supple] : supple [Thyroid Normal, No Nodules] : the thyroid was normal and there were no nodules present [No Respiratory Distress] : no respiratory distress  [No Accessory Muscle Use] : no accessory muscle use [Clear to Auscultation] : lungs were clear to auscultation bilaterally [Normal Rate] : normal rate  [Normal S1, S2] : normal S1 and S2 [No Carotid Bruits] : no carotid bruits [No Abdominal Bruit] : a ~M bruit was not heard ~T in the abdomen [Pedal Pulses Present] : the pedal pulses are present [Soft] : abdomen soft [Non Tender] : non-tender [Non-distended] : non-distended [No Masses] : no abdominal mass palpated [No HSM] : no HSM [Normal Bowel Sounds] : normal bowel sounds [Normal Posterior Cervical Nodes] : no posterior cervical lymphadenopathy [Normal Anterior Cervical Nodes] : no anterior cervical lymphadenopathy [No CVA Tenderness] : no CVA  tenderness [No Spinal Tenderness] : no spinal tenderness [No Joint Swelling] : no joint swelling [Grossly Normal Strength/Tone] : grossly normal strength/tone [No Rash] : no rash [Coordination Grossly Intact] : coordination grossly intact [No Focal Deficits] : no focal deficits [Normal Gait] : normal gait [Deep Tendon Reflexes (DTR)] : deep tendon reflexes were 2+ and symmetric [Normal Affect] : the affect was normal [Normal Insight/Judgement] : insight and judgment were intact [de-identified] : irregular heart beat known A-fib and medication non-complinace [de-identified] : Grade1+ b/l pitting edema at ankles. Pt did not take water pills.  [de-identified] : Left lower inguinal area healed with clean surgical scar.

## 2021-10-28 LAB
25(OH)D3 SERPL-MCNC: 26.8 NG/ML
ALBUMIN SERPL ELPH-MCNC: 4.3 G/DL
ALP BLD-CCNC: 72 U/L
ALT SERPL-CCNC: 14 U/L
ANION GAP SERPL CALC-SCNC: 15 MMOL/L
AST SERPL-CCNC: 20 U/L
BASOPHILS # BLD AUTO: 0.06 K/UL
BASOPHILS NFR BLD AUTO: 0.8 %
BILIRUB SERPL-MCNC: 1 MG/DL
BUN SERPL-MCNC: 15 MG/DL
CALCIUM SERPL-MCNC: 9.6 MG/DL
CHLORIDE SERPL-SCNC: 98 MMOL/L
CHOLEST SERPL-MCNC: 217 MG/DL
CO2 SERPL-SCNC: 24 MMOL/L
CREAT SERPL-MCNC: 1.29 MG/DL
EOSINOPHIL # BLD AUTO: 0.03 K/UL
EOSINOPHIL NFR BLD AUTO: 0.4 %
ESTIMATED AVERAGE GLUCOSE: 134 MG/DL
GLUCOSE SERPL-MCNC: 97 MG/DL
HBA1C MFR BLD HPLC: 6.3 %
HCT VFR BLD CALC: 52.1 %
HDLC SERPL-MCNC: 76 MG/DL
HGB BLD-MCNC: 16.7 G/DL
IMM GRANULOCYTES NFR BLD AUTO: 0.4 %
LDLC SERPL CALC-MCNC: 124 MG/DL
LYMPHOCYTES # BLD AUTO: 0.75 K/UL
LYMPHOCYTES NFR BLD AUTO: 10 %
MAN DIFF?: NORMAL
MCHC RBC-ENTMCNC: 26.4 PG
MCHC RBC-ENTMCNC: 32.1 GM/DL
MCV RBC AUTO: 82.3 FL
MONOCYTES # BLD AUTO: 0.77 K/UL
MONOCYTES NFR BLD AUTO: 10.3 %
NEUTROPHILS # BLD AUTO: 5.86 K/UL
NEUTROPHILS NFR BLD AUTO: 78.1 %
NONHDLC SERPL-MCNC: 141 MG/DL
PLATELET # BLD AUTO: 261 K/UL
POTASSIUM SERPL-SCNC: 4 MMOL/L
PROT SERPL-MCNC: 7.2 G/DL
RBC # BLD: 6.33 M/UL
RBC # FLD: 14.6 %
SODIUM SERPL-SCNC: 136 MMOL/L
TRIGL SERPL-MCNC: 87 MG/DL
TSH SERPL-ACNC: 0.67 UIU/ML
WBC # FLD AUTO: 7.5 K/UL

## 2021-10-29 ENCOUNTER — APPOINTMENT (OUTPATIENT)
Dept: GASTROENTEROLOGY | Facility: CLINIC | Age: 66
End: 2021-10-29

## 2021-11-09 ENCOUNTER — FORM ENCOUNTER (OUTPATIENT)
Age: 66
End: 2021-11-09

## 2021-11-10 ENCOUNTER — APPOINTMENT (OUTPATIENT)
Dept: UROLOGY | Facility: CLINIC | Age: 66
End: 2021-11-10
Payer: COMMERCIAL

## 2021-11-10 PROCEDURE — 99213 OFFICE O/P EST LOW 20 MIN: CPT

## 2021-11-11 NOTE — ASSESSMENT
[FreeTextEntry1] : mri images reviewed\par The risks of the biopsy procedure including but not limited to sepsis, hemorrhage,hematuria, hematochezia rectal pain, hematospermia, false positives andfalse negatives, possible need of repeat biopsies, and rectal injury were all discussed, understood, and accepted by the patient.\par \par Will schedule for fusion  biopsy\par

## 2021-11-11 NOTE — HISTORY OF PRESENT ILLNESS
[FreeTextEntry1] : cc f/u retention /psa\par pt underwent left hernia repair postop had szymanski placed \par removed by dr byrd in office \par now cvoiding well\par c/o ed last few years \par psa was high but after cath\par \par \par mri 8 mm pirad4 lesion

## 2021-11-15 LAB — BACTERIA UR CULT: NORMAL

## 2021-12-12 ENCOUNTER — FORM ENCOUNTER (OUTPATIENT)
Age: 66
End: 2021-12-12

## 2022-01-27 ENCOUNTER — NON-APPOINTMENT (OUTPATIENT)
Age: 67
End: 2022-01-27

## 2022-02-09 ENCOUNTER — APPOINTMENT (OUTPATIENT)
Dept: GASTROENTEROLOGY | Facility: CLINIC | Age: 67
End: 2022-02-09
Payer: COMMERCIAL

## 2022-02-09 VITALS
HEIGHT: 77 IN | OXYGEN SATURATION: 99 % | WEIGHT: 267 LBS | SYSTOLIC BLOOD PRESSURE: 244 MMHG | HEART RATE: 96 BPM | TEMPERATURE: 97.4 F | BODY MASS INDEX: 31.53 KG/M2 | DIASTOLIC BLOOD PRESSURE: 135 MMHG

## 2022-02-09 DIAGNOSIS — R10.13 EPIGASTRIC PAIN: ICD-10-CM

## 2022-02-09 DIAGNOSIS — A04.8 OTHER SPECIFIED BACTERIAL INTESTINAL INFECTIONS: ICD-10-CM

## 2022-02-09 PROCEDURE — 99214 OFFICE O/P EST MOD 30 MIN: CPT
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
## 2022-02-09 NOTE — ASSESSMENT
[FreeTextEntry1] : Abdominal Pain: The patient complains of abdominal pain. The patient is to avoid nonsteroidal anti-inflammatory drugs and aspirin. I recommend a trial of Pantoprazole 40 mg once a day for 3 months for the symptoms.  \par Dyspepsia: The patient complains of dyspeptic symptoms.  The patient was advised to continue to abide by an anti-gas (low FOD-MAP) diet.  The patient was previously given a pamphlet for anti-gas (low FOD-MAP).  The patient and I reviewed the anti-gas (low FOD-MAP)diet at length again. The patient is to continue on a trial of Simethicone one tablet 4 times a day p.r.n. abdominal pain and gas.\par GERD: The patient was advised to avoid late-night meals and dietary indiscretions.  The patient was advised to avoid fried and fatty foods.  The patient was advised to abide by an anti-GERD diet. The patient was given a pamphlet for anti-GERD.  The patient and I reviewed the anti-GERD diet at length. I recommend a trial of Pantoprazole 40 mg once a day x 3 months for the symptoms.\par Atypical Chest Pain: The patient complains of atypical chest pain of unclear etiology.  The patient was advised to follow up with the PMD and cardiologist regarding evaluation for the atypical chest pain. The patient was told of possible etiologies such as cardiac, pulmonary, GI, musculoskeletal, stress and other causes for the atypical chest pain.  The patient agrees and will follow-up with the PMD and cardiologist. \par Gastritis: The patient has a history of gastritis. The patient is to avoid nonsteroidal anti-inflammatory drugs and aspirin. I recommend continue on a trial of pantoprazole 40 mg once a day. \par Intestinal Metaplasia of the Stomach: The patient was previously found to have intestinal metaplasia of the stomach on prior upper endoscopy. The findings of intestinal metaplasia of the stomach have an increased risk of gastric cancer. Recent biopsies did not reveal dysplasia. I recommend a repeat upper endoscopy with mapping in 2 years to reassess for intestinal metaplasia and dysplasia unless symptomatic. The patient is aware of the increased risk of intestinal metaplasia and progression to stomach cancer. The patient agrees to follow-up.\par H. pylori Gastritis: The patient was previously found to have H. pylori gastritis on prior upper endoscopy.  The patient  completed a trial of Clarithromycin 500 mg 2 times a day, Amoxicillin 500mg 2 tablets twice a day and Omeprazole 40 mg twice a day for 14 days for H. pylori eradication.  I recommend an H. pylori breath test to assess for eradication of the bacteria. \par Prostate discomfort: I recommend continue followup with urologist, Dr. Osmel Monte to assess his prostate and possible prostate biopsy.\par Follow-up: The patient is to follow-up in the office in 6 months to reassess the symptoms. The patient was told to call the office if any further problems. \par

## 2022-02-10 ENCOUNTER — APPOINTMENT (OUTPATIENT)
Dept: OTHER | Facility: CLINIC | Age: 67
End: 2022-02-10
Payer: COMMERCIAL

## 2022-02-10 VITALS
SYSTOLIC BLOOD PRESSURE: 207 MMHG | OXYGEN SATURATION: 99 % | TEMPERATURE: 97.4 F | DIASTOLIC BLOOD PRESSURE: 143 MMHG | HEART RATE: 75 BPM | BODY MASS INDEX: 30.2 KG/M2 | HEIGHT: 78 IN | WEIGHT: 261 LBS | RESPIRATION RATE: 16 BRPM

## 2022-02-10 PROCEDURE — 99396 PREV VISIT EST AGE 40-64: CPT | Mod: 25

## 2022-02-10 PROCEDURE — 99214 OFFICE O/P EST MOD 30 MIN: CPT | Mod: 25

## 2022-02-10 RX ORDER — PANTOPRAZOLE 40 MG/1
40 TABLET, DELAYED RELEASE ORAL DAILY
Qty: 30 | Refills: 3 | Status: COMPLETED | COMMUNITY
Start: 2021-08-11 | End: 2022-02-10

## 2022-02-11 ENCOUNTER — NON-APPOINTMENT (OUTPATIENT)
Age: 67
End: 2022-02-11

## 2022-02-11 LAB — UREA BREATH TEST QL: NEGATIVE

## 2022-02-15 ENCOUNTER — FORM ENCOUNTER (OUTPATIENT)
Age: 67
End: 2022-02-15

## 2022-02-16 ENCOUNTER — APPOINTMENT (OUTPATIENT)
Dept: UROLOGY | Facility: CLINIC | Age: 67
End: 2022-02-16
Payer: COMMERCIAL

## 2022-02-16 PROCEDURE — 99214 OFFICE O/P EST MOD 30 MIN: CPT

## 2022-02-17 LAB — PSA SERPL-MCNC: 6.97 NG/ML

## 2022-02-21 LAB — BACTERIA UR CULT: NORMAL

## 2022-02-21 NOTE — ASSESSMENT
[FreeTextEntry1] : repeat psa \par reviewed importance of bx for pirad4 lesion \par check ua cx\par sildenafil as needed\par

## 2022-02-21 NOTE — PHYSICAL EXAM
[General Appearance - Well Developed] : well developed [General Appearance - Well Nourished] : well nourished [Normal Appearance] : normal appearance [Well Groomed] : well groomed [General Appearance - In No Acute Distress] : no acute distress [Abdomen Soft] : soft [Abdomen Tenderness] : non-tender [Costovertebral Angle Tenderness] : no ~M costovertebral angle tenderness [Urethral Meatus] : meatus normal [Urinary Bladder Findings] : the bladder was normal on palpation [Scrotum] : the scrotum was normal [Testes Mass (___cm)] : there were no testicular masses [No Prostate Nodules] : no prostate nodules [Edema] : no peripheral edema [] : no respiratory distress [Respiration, Rhythm And Depth] : normal respiratory rhythm and effort [Exaggerated Use Of Accessory Muscles For Inspiration] : no accessory muscle use [Oriented To Time, Place, And Person] : oriented to person, place, and time [Affect] : the affect was normal [Mood] : the mood was normal [Not Anxious] : not anxious [No Focal Deficits] : no focal deficits [Normal Station and Gait] : the gait and station were normal for the patient's age [No Palpable Adenopathy] : no palpable adenopathy

## 2022-02-21 NOTE — HISTORY OF PRESENT ILLNESS
[FreeTextEntry1] : cc f/u retention /psa\par pt underwent left hernia repair postop had szymanski placed \par removed by dr byrd in office \par now voiding well\par c/o ed last few years \par psa was high but after cath\par \par \par mri 8 mm pirad4 lesion was advised bx but did not f/u

## 2022-03-08 ENCOUNTER — NON-APPOINTMENT (OUTPATIENT)
Age: 67
End: 2022-03-08

## 2022-03-11 ENCOUNTER — APPOINTMENT (OUTPATIENT)
Dept: OTHER | Facility: CLINIC | Age: 67
End: 2022-03-11
Payer: COMMERCIAL

## 2022-03-11 DIAGNOSIS — Z12.9 ENCOUNTER FOR SCREENING FOR MALIGNANT NEOPLASM, SITE UNSPECIFIED: ICD-10-CM

## 2022-03-11 PROCEDURE — 99442: CPT | Mod: 95

## 2022-03-15 ENCOUNTER — NON-APPOINTMENT (OUTPATIENT)
Age: 67
End: 2022-03-15

## 2022-03-22 ENCOUNTER — APPOINTMENT (OUTPATIENT)
Dept: UROLOGY | Facility: CLINIC | Age: 67
End: 2022-03-22

## 2022-04-13 ENCOUNTER — FORM ENCOUNTER (OUTPATIENT)
Age: 67
End: 2022-04-13

## 2022-04-18 ENCOUNTER — NON-APPOINTMENT (OUTPATIENT)
Age: 67
End: 2022-04-18

## 2022-04-19 ENCOUNTER — APPOINTMENT (OUTPATIENT)
Dept: UROLOGY | Facility: CLINIC | Age: 67
End: 2022-04-19

## 2022-04-19 VITALS — SYSTOLIC BLOOD PRESSURE: 239 MMHG | HEART RATE: 84 BPM | DIASTOLIC BLOOD PRESSURE: 151 MMHG

## 2022-04-19 VITALS — SYSTOLIC BLOOD PRESSURE: 196 MMHG | DIASTOLIC BLOOD PRESSURE: 140 MMHG

## 2022-04-27 ENCOUNTER — FORM ENCOUNTER (OUTPATIENT)
Age: 67
End: 2022-04-27

## 2022-05-09 ENCOUNTER — RESULT CHARGE (OUTPATIENT)
Age: 67
End: 2022-05-09

## 2022-05-10 ENCOUNTER — APPOINTMENT (OUTPATIENT)
Dept: CARDIOLOGY | Facility: CLINIC | Age: 67
End: 2022-05-10
Payer: COMMERCIAL

## 2022-05-10 ENCOUNTER — NON-APPOINTMENT (OUTPATIENT)
Age: 67
End: 2022-05-10

## 2022-05-10 VITALS
HEIGHT: 78 IN | BODY MASS INDEX: 31.47 KG/M2 | OXYGEN SATURATION: 99 % | DIASTOLIC BLOOD PRESSURE: 104 MMHG | WEIGHT: 272 LBS | HEART RATE: 57 BPM | SYSTOLIC BLOOD PRESSURE: 182 MMHG

## 2022-05-10 PROCEDURE — 99214 OFFICE O/P EST MOD 30 MIN: CPT

## 2022-05-10 PROCEDURE — 93000 ELECTROCARDIOGRAM COMPLETE: CPT

## 2022-05-19 ENCOUNTER — APPOINTMENT (OUTPATIENT)
Dept: CARDIOLOGY | Facility: CLINIC | Age: 67
End: 2022-05-19
Payer: COMMERCIAL

## 2022-05-19 VITALS
OXYGEN SATURATION: 99 % | BODY MASS INDEX: 31.43 KG/M2 | DIASTOLIC BLOOD PRESSURE: 90 MMHG | HEART RATE: 63 BPM | SYSTOLIC BLOOD PRESSURE: 179 MMHG | WEIGHT: 272 LBS

## 2022-05-19 PROCEDURE — 99214 OFFICE O/P EST MOD 30 MIN: CPT

## 2022-05-19 PROCEDURE — 93000 ELECTROCARDIOGRAM COMPLETE: CPT

## 2022-05-19 RX ORDER — HYDRALAZINE HYDROCHLORIDE 100 MG/1
100 TABLET ORAL
Qty: 180 | Refills: 1 | Status: ACTIVE | COMMUNITY
Start: 2020-12-18 | End: 1900-01-01

## 2022-05-20 ENCOUNTER — NON-APPOINTMENT (OUTPATIENT)
Age: 67
End: 2022-05-20

## 2022-05-20 NOTE — HISTORY OF PRESENT ILLNESS
[Preoperative Visit] : for a medical evaluation prior to surgery [Scheduled Procedure ___] : a [unfilled] [Date of Surgery ___] : on [unfilled] [Surgeon Name ___] : surgeon: [unfilled] [FreeTextEntry1] : Chad has not been perfect about BP meds but taking them the last few days. Now going for biopsy but BP has to be better. No HA, blurry vision or dizziness.

## 2022-05-20 NOTE — HISTORY OF PRESENT ILLNESS
[Preoperative Visit] : for a medical evaluation prior to surgery [Scheduled Procedure ___] : a [unfilled] [Date of Surgery ___] : on [unfilled] [Surgeon Name ___] : surgeon: [unfilled] [FreeTextEntry1] : Chad has been compliant with all medications. No HA, blurry vision or dizziness. Biopsy scheduled for next Thursday.

## 2022-05-20 NOTE — DISCUSSION/SUMMARY
[Patient Intermediate Risk] : the patient is an intermediate risk [Optimized for Surgery] : the patient is optimized for surgery [Procedure Low Risk] : the procedure risk is low [FreeTextEntry1] : The patient is a 66-year-old gentleman GERD, prediabetes, HTN, HLD, Afib awaiting prostate biopsy but hypertensive.\par  #1 Afib- c/w metoprolol, eliquis 5mg bid\par  #2 Htn-  resume chlorthalidone, olmesartan, hydralazine 25mg bid and amlodipine 5mg, compliance emphasized and risk of stroke, dialysis and MI discussed, new rx sent \par  #3 LIpids- c/w atorvastatin\par  #4 Hyperglycemia- We discussed adherence to a low fat low cholesterol, ADA diet, weight loss and regular daily exercise. \par . #5 GERD- on protonix \par  #6 - prostate biopsy 5/23/22, clearance pending improvement in BP.\par 5/20/22 Addendum: There are no cardiac contraindications to proceeding with prostate biopsy off eliquis for two days prior.

## 2022-05-20 NOTE — DISCUSSION/SUMMARY
[Procedure Low Risk] : the procedure risk is low [Patient Intermediate Risk] : the patient is an intermediate risk [Optimized for Surgery] : the patient is optimized for surgery [FreeTextEntry1] : The patient is a 66-year-old gentleman GERD, prediabetes, HTN, HLD, Afib awaiting prostate biopsy who remains hypertensive.\par  #1 Afib- c/w metoprolol, eliquis 5mg bid\par  #2 Htn-  c/w chlorthalidone, olmesartan, amlodipine 5mg and increase hydralazine to 50mg bid, unable to comply with tid dosing\par ,#3 LIpids- c/w atorvastatin\par  #4 Hyperglycemia- We discussed adherence to a low fat low cholesterol, ADA diet, weight loss and regular daily exercise. \par #5 GERD- on protonix \par  #6 - prostate biopsy 5/23/22, clearance pending improvement in BP.\par 5/20/22 Addendum: There are no cardiac contraindications to proceeding with prostate biopsy off eliquis for two days prior.

## 2022-05-23 ENCOUNTER — APPOINTMENT (OUTPATIENT)
Dept: UROLOGY | Facility: CLINIC | Age: 67
End: 2022-05-23

## 2022-05-23 VITALS
HEIGHT: 78 IN | DIASTOLIC BLOOD PRESSURE: 111 MMHG | HEART RATE: 82 BPM | RESPIRATION RATE: 16 BRPM | WEIGHT: 272 LBS | TEMPERATURE: 97.6 F | BODY MASS INDEX: 31.47 KG/M2 | SYSTOLIC BLOOD PRESSURE: 186 MMHG

## 2022-05-23 VITALS
HEART RATE: 74 BPM | BODY MASS INDEX: 31.47 KG/M2 | WEIGHT: 272 LBS | OXYGEN SATURATION: 99 % | HEIGHT: 78 IN | SYSTOLIC BLOOD PRESSURE: 187 MMHG | TEMPERATURE: 97.6 F | RESPIRATION RATE: 16 BRPM | DIASTOLIC BLOOD PRESSURE: 122 MMHG

## 2022-05-24 ENCOUNTER — APPOINTMENT (OUTPATIENT)
Dept: CARDIOLOGY | Facility: CLINIC | Age: 67
End: 2022-05-24
Payer: COMMERCIAL

## 2022-05-24 VITALS
OXYGEN SATURATION: 95 % | SYSTOLIC BLOOD PRESSURE: 182 MMHG | DIASTOLIC BLOOD PRESSURE: 86 MMHG | HEART RATE: 85 BPM | HEIGHT: 78 IN

## 2022-05-24 PROCEDURE — 99213 OFFICE O/P EST LOW 20 MIN: CPT

## 2022-05-27 NOTE — DISCUSSION/SUMMARY
[Procedure Low Risk] : the procedure risk is low [Patient Intermediate Risk] : the patient is an intermediate risk [Optimized for Surgery] : the patient is optimized for surgery [FreeTextEntry1] : The patient is a 66-year-old gentleman GERD, prediabetes, HTN, HLD, Afib awaiting prostate biopsy who remains hypertensive.\par  #1 Afib- c/w metoprolol, eliquis 5mg bid\par  #2 Htn-  c/w chlorthalidone, olmesartan, amlodipine 5mg and increase hydralazine to 100mg bid and add spironolactone\par ,#3 LIpids- c/w atorvastatin\par  #4 Hyperglycemia- We discussed adherence to a low fat low cholesterol, ADA diet, weight loss and regular daily exercise. \par #5 GERD- on protonix \par  #6 - prostate biopsy 5/23/22, clearance pending improvement in BP.\par 5/20/22 Addendum: There are no cardiac contraindications to proceeding with prostate biopsy off eliquis for two days prior.

## 2022-05-27 NOTE — HISTORY OF PRESENT ILLNESS
[Preoperative Visit] : for a medical evaluation prior to surgery [Scheduled Procedure ___] : a [unfilled] [Date of Surgery ___] : on [unfilled] [Surgeon Name ___] : surgeon: [unfilled] [FreeTextEntry1] : Chad has been compliant with all medications. No HA, blurry vision or dizziness. Biopsy cancelled because htn. He gets nervous and thinks that is part of the problem.

## 2022-05-31 ENCOUNTER — APPOINTMENT (OUTPATIENT)
Dept: CARDIOLOGY | Facility: CLINIC | Age: 67
End: 2022-05-31
Payer: COMMERCIAL

## 2022-05-31 VITALS
BODY MASS INDEX: 31.47 KG/M2 | WEIGHT: 272 LBS | HEART RATE: 76 BPM | OXYGEN SATURATION: 97 % | SYSTOLIC BLOOD PRESSURE: 227 MMHG | DIASTOLIC BLOOD PRESSURE: 126 MMHG | HEIGHT: 78 IN

## 2022-05-31 PROCEDURE — 99213 OFFICE O/P EST LOW 20 MIN: CPT

## 2022-05-31 NOTE — HISTORY OF PRESENT ILLNESS
[FreeTextEntry1] : Chad has been taking his BP at home and 140/90. Had a beer yesterday when he went fishing. No HA, blurry vision or dizziness.

## 2022-05-31 NOTE — DISCUSSION/SUMMARY
[___ Week(s)] : in [unfilled] week(s) [FreeTextEntry1] : The patient is a 66-year-old gentleman GERD, prediabetes, HTN, HLD, Afib awaiting prostate biopsy who remains hypertensive possible anxiety component.\par #1 Afib- c/w metoprolol, eliquis 5mg bid\par  #2 Htn-  c/w chlorthalidone, olmesartan, amlodipine 5mg and hydralazine 100mg bid and spironolactone, add xanax before visit, renal evaluation\par #3 LIpids- c/w atorvastatin\par  #4 Hyperglycemia- We discussed adherence to a low fat low cholesterol, ADA diet, weight loss and regular daily exercise. \par #5 GERD- on protonix \par  #6 - prostate biopsy pending

## 2022-06-07 ENCOUNTER — APPOINTMENT (OUTPATIENT)
Dept: CARDIOLOGY | Facility: CLINIC | Age: 67
End: 2022-06-07
Payer: COMMERCIAL

## 2022-06-07 ENCOUNTER — NON-APPOINTMENT (OUTPATIENT)
Age: 67
End: 2022-06-07

## 2022-06-07 VITALS
HEART RATE: 67 BPM | HEIGHT: 78 IN | SYSTOLIC BLOOD PRESSURE: 219 MMHG | DIASTOLIC BLOOD PRESSURE: 119 MMHG | OXYGEN SATURATION: 97 % | WEIGHT: 270 LBS | BODY MASS INDEX: 31.24 KG/M2

## 2022-06-07 PROCEDURE — 93000 ELECTROCARDIOGRAM COMPLETE: CPT

## 2022-06-07 PROCEDURE — 99213 OFFICE O/P EST LOW 20 MIN: CPT

## 2022-06-08 LAB
CREAT SPEC-SCNC: 30 MG/DL
CREAT/PROT UR: 1.8 RATIO
PROT UR-MCNC: 54 MG/DL

## 2022-06-08 NOTE — DISCUSSION/SUMMARY
[___ Week(s)] : in [unfilled] week(s) [FreeTextEntry1] : The patient is a 66-year-old gentleman GERD, prediabetes, HTN, HLD, Afib awaiting prostate biopsy who remains hypertensive possible anxiety component.\par #1 Afib- c/w metoprolol, eliquis 5mg bid\par  #2 Htn-  c/w chlorthalidone, olmesartan, amlodipine 10 mg and hydralazine 100mg bid and spironolactone, start minoxidil, add xanax before visit, renal evaluation again recommended\par #3 LIpids- c/w atorvastatin\par  #4 Hyperglycemia- We discussed adherence to a low fat low cholesterol, ADA diet, weight loss and regular daily exercise. \par #5 GERD- on protonix \par  #6 - prostate biopsy pending

## 2022-06-08 NOTE — HISTORY OF PRESENT ILLNESS
[FreeTextEntry1] : Chad did not take the xanax as prescribed but review of his home monitor still demonstrates elevated readings. Unable to tolerate clonidine in the past.

## 2022-06-21 ENCOUNTER — APPOINTMENT (OUTPATIENT)
Dept: CARDIOLOGY | Facility: CLINIC | Age: 67
End: 2022-06-21

## 2022-06-21 LAB
CREAT UR-MCNC: 29.3 MG/DL
METANEPHS 24H UR-MCNC: 19 UG/L
METANEPHS/CREAT UR: 0.3
NORMETANEPHRINE 24H UR-MCNC: 53 UG/L

## 2022-06-24 ENCOUNTER — APPOINTMENT (OUTPATIENT)
Dept: ULTRASOUND IMAGING | Facility: CLINIC | Age: 67
End: 2022-06-24

## 2022-06-24 PROCEDURE — 76775 US EXAM ABDO BACK WALL LIM: CPT

## 2022-06-27 ENCOUNTER — APPOINTMENT (OUTPATIENT)
Dept: NEPHROLOGY | Facility: CLINIC | Age: 67
End: 2022-06-27
Payer: COMMERCIAL

## 2022-06-27 ENCOUNTER — LABORATORY RESULT (OUTPATIENT)
Age: 67
End: 2022-06-27

## 2022-06-27 VITALS — DIASTOLIC BLOOD PRESSURE: 110 MMHG | SYSTOLIC BLOOD PRESSURE: 190 MMHG

## 2022-06-27 VITALS
HEIGHT: 78 IN | WEIGHT: 268.96 LBS | OXYGEN SATURATION: 98 % | SYSTOLIC BLOOD PRESSURE: 100 MMHG | BODY MASS INDEX: 31.12 KG/M2 | DIASTOLIC BLOOD PRESSURE: 73 MMHG | HEART RATE: 72 BPM | TEMPERATURE: 98.2 F

## 2022-06-27 DIAGNOSIS — Z87.891 PERSONAL HISTORY OF NICOTINE DEPENDENCE: ICD-10-CM

## 2022-06-27 DIAGNOSIS — Z78.9 OTHER SPECIFIED HEALTH STATUS: ICD-10-CM

## 2022-06-27 DIAGNOSIS — I10 ESSENTIAL (PRIMARY) HYPERTENSION: ICD-10-CM

## 2022-06-27 DIAGNOSIS — R73.03 PREDIABETES.: ICD-10-CM

## 2022-06-27 DIAGNOSIS — Z86.69 PERSONAL HISTORY OF OTHER DISEASES OF THE NERVOUS SYSTEM AND SENSE ORGANS: ICD-10-CM

## 2022-06-27 DIAGNOSIS — I51.7 CARDIOMEGALY: ICD-10-CM

## 2022-06-27 LAB
25(OH)D3 SERPL-MCNC: 32.3 NG/ML
ALBUMIN SERPL ELPH-MCNC: 4.3 G/DL
ANION GAP SERPL CALC-SCNC: 12 MMOL/L
BASOPHILS # BLD AUTO: 0.06 K/UL
BASOPHILS NFR BLD AUTO: 1.1 %
BUN SERPL-MCNC: 17 MG/DL
CALCIUM SERPL-MCNC: 9.5 MG/DL
CALCIUM SERPL-MCNC: 9.5 MG/DL
CHLORIDE SERPL-SCNC: 104 MMOL/L
CO2 SERPL-SCNC: 24 MMOL/L
CREAT SERPL-MCNC: 1.57 MG/DL
EGFR: 49 ML/MIN/1.73M2
EOSINOPHIL # BLD AUTO: 0.04 K/UL
EOSINOPHIL NFR BLD AUTO: 0.7 %
ESTIMATED AVERAGE GLUCOSE: 131 MG/DL
FERRITIN SERPL-MCNC: 89 NG/ML
GLUCOSE SERPL-MCNC: 112 MG/DL
HAV IGM SER QL: NONREACTIVE
HBA1C MFR BLD HPLC: 6.2 %
HBV CORE IGM SER QL: NONREACTIVE
HBV SURFACE AG SER QL: NONREACTIVE
HCT VFR BLD CALC: 50.3 %
HCV AB SER QL: NONREACTIVE
HCV S/CO RATIO: 0.15 S/CO
HGB BLD-MCNC: 16.9 G/DL
HIV1+2 AB SPEC QL IA.RAPID: NONREACTIVE
IMM GRANULOCYTES NFR BLD AUTO: 0.2 %
IRON SATN MFR SERPL: 35 %
IRON SERPL-MCNC: 116 UG/DL
LYMPHOCYTES # BLD AUTO: 1.29 K/UL
LYMPHOCYTES NFR BLD AUTO: 23.5 %
MAGNESIUM SERPL-MCNC: 2 MG/DL
MAN DIFF?: NORMAL
MCHC RBC-ENTMCNC: 27.3 PG
MCHC RBC-ENTMCNC: 33.6 GM/DL
MCV RBC AUTO: 81.1 FL
MONOCYTES # BLD AUTO: 0.57 K/UL
MONOCYTES NFR BLD AUTO: 10.4 %
NEUTROPHILS # BLD AUTO: 3.52 K/UL
NEUTROPHILS NFR BLD AUTO: 64.1 %
PARATHYROID HORMONE INTACT: 148 PG/ML
PHOSPHATE SERPL-MCNC: 3.2 MG/DL
PLATELET # BLD AUTO: 193 K/UL
POTASSIUM SERPL-SCNC: 3.7 MMOL/L
RBC # BLD: 6.2 M/UL
RBC # FLD: 17.6 %
SODIUM SERPL-SCNC: 139 MMOL/L
TIBC SERPL-MCNC: 336 UG/DL
UIBC SERPL-MCNC: 220 UG/DL
WBC # FLD AUTO: 5.49 K/UL

## 2022-06-27 PROCEDURE — 99204 OFFICE O/P NEW MOD 45 MIN: CPT

## 2022-06-27 RX ORDER — SIMETHICONE 180 MG
180 CAPSULE ORAL 4 TIMES DAILY
Qty: 120 | Refills: 3 | Status: DISCONTINUED | COMMUNITY
Start: 2021-08-11 | End: 2022-06-27

## 2022-06-27 NOTE — REVIEW OF SYSTEMS
[Loss Of Hearing] : hearing loss [As Noted in HPI] : as noted in HPI [SOB on Exertion] : shortness of breath during exertion [Joint Swelling] : joint swelling [Joint Stiffness] : joint stiffness [Itching] : itching [Anxiety] : anxiety [Depression] : depression [Negative] : Heme/Lymph [Recent Weight Gain (___ Lbs)] : no recent weight gain [Recent Weight Loss (___ Lbs)] : no recent weight loss [Cough] : no cough [Orthopnea] : no orthopnea [Skin Wound] : no skin wound [Suicidal] : not suicidal [Muscle Weakness] : no muscle weakness [Feelings Of Weakness] : no feelings of weakness [FreeTextEntry9] : knees [FreeTextEntry4] : +tinnitus

## 2022-06-27 NOTE — ASSESSMENT
[FreeTextEntry1] : Mr. Dan is a 67 y/o male with h/o pre diabetes, uncontrolled HTN and proteinuria here for evaluation and management. \par \par Resistant HTN: His BP remains uncontrolled however he is not taking most of the medications. \par We went over all his medications: He is not taking Minoxidil as well as metoprolol\par He ran out of olmesartan a few weeks back so has not been taking that either\par He thinks he takes amlodipine/chlorthalidone: but not regularly. \par \par His only BP medications are: Hydralazine 100 BID and spironolactone 25 mg Daily\par Told him to increase the spironolactone to 100 mg daily\par Ideally would also like to stop the hydralazine and restart amlodipine daily. Will d/w Dr. Elder\par \par Also check PRA/aldosterone level today\par \par CKD/Proteinuria: This could be in the setting of uncontrolled HTN\par -BP control is imperative. His proteinuria should improve with BP control\par -Slight bump in creatinine noted. \par -Genetic testing done today \par -Also did al limited serological w/u to r//o secondary causes of proteinuria. Would ideally like to stop hydralazine as it is associated with vasculitis/drug induce lupus etc\par -Reinforced to him that he take no NSAIDS\par -Low sodium diet \par \par \par  Time spent 45 min\par F/U in August\par \par

## 2022-06-27 NOTE — HISTORY OF PRESENT ILLNESS
[FreeTextEntry1] : Mr. Dan is a 67 y/o male with long standing H/O HTN, pre diabetes here for evaluation of uncontrolled HTN as well as proteinuria. \par \par Per patient, he has a long standing H/O HTN for many years. For the most part, BP has been uncontrolled, although there was a period in between when his BP was well controlled on a certain regimen which he is unable to recall. He states that he takes his BP at home, and is pressure is usually high. \par He is unable to recall the medications that he is taking for his BP and states that the "medications are on his file"\par \par He also has a h/o Af ib and is supposed to be taking Eliquis which he stopped on his own for unclear reasons. \par \par He does admit to taking NSAIDS occasionally although most of the time he takes tylenol. \par  \par \par No COVID , Has taken covid shot\par \par He needs a prostate biopsy however has not been able to get it done due to uncontrolled HTN. \par \par Recently, also noted to have proteinuria of 1.8 gms. He  has had proteinuria on dipstick for many years, however it has not been quantified in the past \par \par He admits to enjoy drinking and usually ends up drinking on the weekends. \par \par Also, uses CPAP at night\par

## 2022-06-27 NOTE — PHYSICAL EXAM
[General Appearance - Alert] : alert [General Appearance - In No Acute Distress] : in no acute distress [Neck Appearance] : the appearance of the neck was normal [Respiration, Rhythm And Depth] : normal respiratory rhythm and effort [Exaggerated Use Of Accessory Muscles For Inspiration] : no accessory muscle use [Auscultation Breath Sounds / Voice Sounds] : lungs were clear to auscultation bilaterally [Apical Impulse] : the apical impulse was normal [Heart Rate And Rhythm] : heart rate was normal and rhythm regular [Heart Sounds] : normal S1 and S2 [Heart Sounds Gallop] : no gallops [Edema] : there was no peripheral edema [Bowel Sounds] : normal bowel sounds [Abdomen Soft] : soft [Abdomen Tenderness] : non-tender [Abnormal Walk] : normal gait [Involuntary Movements] : no involuntary movements were seen [No Focal Deficits] : no focal deficits [] : no rash [Impaired Insight] : insight and judgment were intact [Oriented To Time, Place, And Person] : oriented to person, place, and time [Affect] : the affect was normal [Mood] : the mood was normal

## 2022-06-28 ENCOUNTER — APPOINTMENT (OUTPATIENT)
Dept: CARDIOLOGY | Facility: CLINIC | Age: 67
End: 2022-06-28
Payer: COMMERCIAL

## 2022-06-28 VITALS
OXYGEN SATURATION: 100 % | HEIGHT: 78 IN | WEIGHT: 268 LBS | DIASTOLIC BLOOD PRESSURE: 133 MMHG | BODY MASS INDEX: 31.01 KG/M2 | DIASTOLIC BLOOD PRESSURE: 100 MMHG | HEART RATE: 90 BPM | SYSTOLIC BLOOD PRESSURE: 170 MMHG | SYSTOLIC BLOOD PRESSURE: 217 MMHG

## 2022-06-28 LAB
ALDOSTERONE SERUM: 18.8 NG/DL
APPEARANCE: CLEAR
BACTERIA: NEGATIVE
BILIRUBIN URINE: NEGATIVE
BLOOD URINE: NEGATIVE
C3 SERPL-MCNC: 118 MG/DL
C4 SERPL-MCNC: 29 MG/DL
COLOR: NORMAL
CREAT SPEC-SCNC: 118 MG/DL
CREAT/PROT UR: 1.4 RATIO
DEPRECATED KAPPA LC FREE/LAMBDA SER: 1.94 RATIO
ENA RNP AB SER IA-ACNC: 0.5 AL
ENA SM AB SER IA-ACNC: <0.2 AL
GLUCOSE QUALITATIVE U: NEGATIVE
HYALINE CASTS: 0 /LPF
KAPPA LC CSF-MCNC: 1.74 MG/DL
KAPPA LC SERPL-MCNC: 3.38 MG/DL
KETONES URINE: NEGATIVE
LEUKOCYTE ESTERASE URINE: NEGATIVE
MICROSCOPIC-UA: NORMAL
NITRITE URINE: NEGATIVE
PH URINE: 6.5
PROT UR-MCNC: 162 MG/DL
PROTEIN URINE: ABNORMAL
RED BLOOD CELLS URINE: 1 /HPF
SPECIFIC GRAVITY URINE: 1.01
SQUAMOUS EPITHELIAL CELLS: 0 /HPF
UROBILINOGEN URINE: NORMAL
WHITE BLOOD CELLS URINE: 1 /HPF

## 2022-06-28 PROCEDURE — 99213 OFFICE O/P EST LOW 20 MIN: CPT

## 2022-06-29 ENCOUNTER — NON-APPOINTMENT (OUTPATIENT)
Age: 67
End: 2022-06-29

## 2022-06-29 LAB — DSDNA AB SER-ACNC: 13 IU/ML

## 2022-06-29 NOTE — DISCUSSION/SUMMARY
[___ Week(s)] : in [unfilled] week(s) [FreeTextEntry1] : The patient is a 66-year-old gentleman GERD, prediabetes, HTN, HLD, Afib awaiting prostate biopsy who remains hypertensive possible anxiety and compliance component.\par #1 Afib- c/w metoprolol, eliquis 5mg bid\par  #2 Htn-  c/w chlorthalidone,  amlodipine 10 mg, hydralazine 100mg bid and spironolactone at 100mg, minoxidil, add xanax before visit, renal sono normal, w/u in progress, f/u Dr. Pride as well, labs next visit for renal fxn and K\par #3 LIpids- c/w atorvastatin\par  #4 Hyperglycemia- We discussed adherence to a low fat low cholesterol, ADA diet, weight loss and regular daily exercise. \par #5 GERD- on protonix \par  #6 - prostate biopsy pending

## 2022-06-29 NOTE — HISTORY OF PRESENT ILLNESS
[FreeTextEntry1] : Chad saw Dr. Pride yesterday. Unclear what medications was taking. Brought a print out from the pharmacy. Concerned that the hydralazine will be stopped. Not taking olmesartan. States he is taking amlodipine even though it is not on the print out.

## 2022-07-01 LAB
HISTONE AB SER QL: 0.5 UNITS
M PROTEIN SPEC IFE-MCNC: NORMAL

## 2022-07-05 LAB — RENIN ACTIVITY, PLASMA: 0.23 NG/ML/HR

## 2022-07-06 ENCOUNTER — RX RENEWAL (OUTPATIENT)
Age: 67
End: 2022-07-06

## 2022-07-11 LAB
ANA PAT FLD IF-IMP: ABNORMAL
ANA SER IF-ACNC: ABNORMAL

## 2022-07-12 ENCOUNTER — APPOINTMENT (OUTPATIENT)
Dept: CARDIOLOGY | Facility: CLINIC | Age: 67
End: 2022-07-12

## 2022-07-12 VITALS
HEART RATE: 77 BPM | SYSTOLIC BLOOD PRESSURE: 177 MMHG | WEIGHT: 268 LBS | DIASTOLIC BLOOD PRESSURE: 89 MMHG | HEIGHT: 78 IN | BODY MASS INDEX: 31.01 KG/M2 | OXYGEN SATURATION: 98 %

## 2022-07-12 PROCEDURE — 99213 OFFICE O/P EST LOW 20 MIN: CPT

## 2022-07-12 RX ORDER — OLMESARTAN MEDOXOMIL 40 MG/1
40 TABLET, FILM COATED ORAL
Qty: 90 | Refills: 3 | Status: DISCONTINUED | COMMUNITY
Start: 2021-01-21 | End: 2022-07-12

## 2022-07-12 NOTE — HISTORY OF PRESENT ILLNESS
[FreeTextEntry1] : Chad has not taken the hydralazine in a few days. No olmesartan because too expensive. Took all other meds. No beer or alcohol.Appears calmer.

## 2022-07-12 NOTE — DISCUSSION/SUMMARY
[___ Month(s)] : in [unfilled] month(s) [FreeTextEntry1] : The patient is a 66-year-old gentleman GERD, prediabetes, HTN, HLD, Afib awaiting prostate biopsy whose BP markedly improved.\par #1 Afib- c/w metoprolol, eliquis 5mg bid\par  #2 Htn-  c/w chlorthalidone,  amlodipine 10 mg,and spironolactone 25mg, minoxidil, start losartan 100mg and hold hydralazine, add xanax before visit, renal sono normal, w/u in progress, f/u Dr. Pride as well, labs next visit for renal fxn and K\par #3 LIpids- c/w atorvastatin\par  #4 Hyperglycemia- We discussed adherence to a low fat low cholesterol, ADA diet, weight loss and regular daily exercise. \par #5 GERD- on protonix \par  #6 - prostate biopsy pending

## 2022-07-19 NOTE — ASSESSMENT
[FreeTextEntry1] : Abdominal Pain: The patient complains of abdominal pain. The patient is to avoid nonsteroidal anti-inflammatory drugs and aspirin. I recommend a trial of Pantoprazole 40 mg once a day for 3 months for the symptoms.  \par Dyspepsia: The patient complains of dyspeptic symptoms.  The patient was advised to abide by an anti-gas diet.  The patient was given a pamphlet for anti-gas.  The patient and I reviewed the anti-gas diet at length. The patient is to start on a trial of Phazyme one tablet 3 times a day p.r.n. abdominal pain and gas.\par GERD: The patient was advised to avoid late-night meals and dietary indiscretions.  The patient was advised to avoid fried and fatty foods.  The patient was advised to abide by an anti-GERD diet. The patient was given a pamphlet for anti-GERD.  The patient and I reviewed the anti-GERD diet at length. I recommend a trial of Pantoprazole 40 mg once a day x 3 months for the symptoms.\par Upper Endoscopy: I recommend an upper endoscopy  to assess the symptoms for peptic ulcer disease versus esophagitis.  The patient was told of the risks and benefits of the procedure.  The patient was told of the risks of perforation, emergency surgery, bleeding, infections and missed lesions. The patient agreed and will schedule for procedure. The patient is to be n.p.o. after midnight.  The patient is to return for the procedure. \par Prior Endoscopic Procedures: The patient had a prior colonoscopy performed by another gastroenterologist, Dr. Beltre.  I will try to obtain the prior colonoscopy reports.  The patient is to sign a record release to obtain those records.\par Follow-up: The patient is to follow-up in the office in 4 weeks to reassess the symptoms. The patient was told to call the office if any further problems.\par \par \par \par 
yes

## 2022-07-25 ENCOUNTER — RX RENEWAL (OUTPATIENT)
Age: 67
End: 2022-07-25

## 2022-08-09 ENCOUNTER — FORM ENCOUNTER (OUTPATIENT)
Age: 67
End: 2022-08-09

## 2022-08-10 ENCOUNTER — APPOINTMENT (OUTPATIENT)
Dept: GASTROENTEROLOGY | Facility: CLINIC | Age: 67
End: 2022-08-10

## 2022-08-10 VITALS
HEIGHT: 78 IN | OXYGEN SATURATION: 99 % | SYSTOLIC BLOOD PRESSURE: 187 MMHG | TEMPERATURE: 98.4 F | HEART RATE: 97 BPM | DIASTOLIC BLOOD PRESSURE: 104 MMHG | BODY MASS INDEX: 31.24 KG/M2 | WEIGHT: 270 LBS

## 2022-08-10 DIAGNOSIS — R10.84 GENERALIZED ABDOMINAL PAIN: ICD-10-CM

## 2022-08-10 PROCEDURE — 99213 OFFICE O/P EST LOW 20 MIN: CPT
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
## 2022-08-10 NOTE — ASSESSMENT
Called and schedule today   [FreeTextEntry1] : Abdominal Pain: The patient complains of abdominal pain. The patient is to avoid nonsteroidal anti-inflammatory drugs and aspirin. I recommend a trial of Pantoprazole 40 mg once a day for 3 months for the symptoms.  \par Dyspepsia: The patient complains of dyspeptic symptoms.  The patient was advised to continue to abide by an anti-gas (low FOD-MAP) diet.  The patient was previously given a pamphlet for anti-gas (low FOD-MAP).  The patient and I reviewed the anti-gas (low FOD-MAP)diet at length again. The patient is to continue on a trial of Simethicone one tablet 4 times a day p.r.n. abdominal pain and gas.\par GERD: The patient was advised to avoid late-night meals and dietary indiscretions.  The patient was advised to avoid fried and fatty foods.  The patient was advised to abide by an anti-GERD diet. The patient was given a pamphlet for anti-GERD.  The patient and I reviewed the anti-GERD diet at length. I recommend a trial of Pantoprazole 40 mg once a day x 3 months for the symptoms.\par Gastritis: The patient has a history of gastritis. The patient is to avoid nonsteroidal anti-inflammatory drugs and aspirin. I recommend continue on a trial of pantoprazole 40 mg once a day. \par Intestinal Metaplasia of the Stomach: The patient was previously found to have intestinal metaplasia of the stomach on prior upper endoscopy. The findings of intestinal metaplasia of the stomach have an increased risk of gastric cancer. Recent biopsies did not reveal dysplasia. I recommend a repeat upper endoscopy with mapping in 2 years to reassess for intestinal metaplasia and dysplasia unless symptomatic. The patient is aware of the increased risk of intestinal metaplasia and progression to stomach cancer. The patient agrees to follow-up.\par H. pylori Gastritis: The patient was previously found to have H. pylori gastritis on prior upper endoscopy. The patient completed a trial of Clarithromycin 500 mg 2 times a day, Amoxicillin 500mg 2 tablets twice a day and Omeprazole 40 mg twice a day for 14 days for H. pylori eradication. The H. pylori breath test performed on February 9, 2021 was not detected.\par Prostate discomfort: I recommend continue followup with urologist, Dr. Osmel Monte to assess his prostate and possible prostate biopsy.\par Follow-up: The patient is to follow-up in the office in 6 months to reassess the symptoms. The patient was told to call the office if any further problems. \par  \par

## 2022-08-16 ENCOUNTER — APPOINTMENT (OUTPATIENT)
Dept: UROLOGY | Facility: CLINIC | Age: 67
End: 2022-08-16

## 2022-08-16 ENCOUNTER — OUTPATIENT (OUTPATIENT)
Dept: OUTPATIENT SERVICES | Facility: HOSPITAL | Age: 67
LOS: 1 days | End: 2022-08-16
Payer: COMMERCIAL

## 2022-08-16 VITALS — SYSTOLIC BLOOD PRESSURE: 152 MMHG | HEART RATE: 96 BPM | DIASTOLIC BLOOD PRESSURE: 85 MMHG

## 2022-08-16 VITALS
RESPIRATION RATE: 18 BRPM | HEART RATE: 93 BPM | SYSTOLIC BLOOD PRESSURE: 197 MMHG | OXYGEN SATURATION: 98 % | DIASTOLIC BLOOD PRESSURE: 106 MMHG

## 2022-08-16 VITALS — SYSTOLIC BLOOD PRESSURE: 178 MMHG | DIASTOLIC BLOOD PRESSURE: 96 MMHG

## 2022-08-16 DIAGNOSIS — Z98.890 OTHER SPECIFIED POSTPROCEDURAL STATES: Chronic | ICD-10-CM

## 2022-08-16 DIAGNOSIS — T14.8 OTHER INJURY OF UNSPECIFIED BODY REGION: Chronic | ICD-10-CM

## 2022-08-16 DIAGNOSIS — R35.0 FREQUENCY OF MICTURITION: ICD-10-CM

## 2022-08-16 PROCEDURE — 76872 US TRANSRECTAL: CPT | Mod: 26

## 2022-08-16 PROCEDURE — 76377 3D RENDER W/INTRP POSTPROCES: CPT | Mod: 26

## 2022-08-16 PROCEDURE — 55700: CPT

## 2022-08-16 PROCEDURE — 76942 ECHO GUIDE FOR BIOPSY: CPT | Mod: 59

## 2022-08-22 ENCOUNTER — NON-APPOINTMENT (OUTPATIENT)
Age: 67
End: 2022-08-22

## 2022-08-23 ENCOUNTER — FORM ENCOUNTER (OUTPATIENT)
Age: 67
End: 2022-08-23

## 2022-08-25 ENCOUNTER — APPOINTMENT (OUTPATIENT)
Dept: CARDIOLOGY | Facility: CLINIC | Age: 67
End: 2022-08-25
Payer: COMMERCIAL

## 2022-08-25 VITALS
WEIGHT: 270 LBS | DIASTOLIC BLOOD PRESSURE: 89 MMHG | HEIGHT: 78 IN | BODY MASS INDEX: 31.24 KG/M2 | SYSTOLIC BLOOD PRESSURE: 160 MMHG | HEART RATE: 77 BPM | OXYGEN SATURATION: 96 %

## 2022-08-25 DIAGNOSIS — R94.31 ABNORMAL ELECTROCARDIOGRAM [ECG] [EKG]: ICD-10-CM

## 2022-08-25 PROCEDURE — 99215 OFFICE O/P EST HI 40 MIN: CPT

## 2022-08-25 PROCEDURE — 93000 ELECTROCARDIOGRAM COMPLETE: CPT

## 2022-08-25 RX ORDER — METOPROLOL SUCCINATE 100 MG/1
100 TABLET, EXTENDED RELEASE ORAL DAILY
Qty: 90 | Refills: 3 | Status: DISCONTINUED | COMMUNITY
Start: 2018-08-28 | End: 2022-08-25

## 2022-08-26 DIAGNOSIS — R97.20 ELEVATED PROSTATE SPECIFIC ANTIGEN [PSA]: ICD-10-CM

## 2022-08-26 PROBLEM — R94.31 ABNORMAL ECG: Status: ACTIVE | Noted: 2017-04-11

## 2022-08-26 NOTE — HISTORY OF PRESENT ILLNESS
[FreeTextEntry1] : Chad had his biopsy and then had blood in his semen. Waiting for results. Brought all his bottle of medication today.  what he is taking and what he is not. Not taking any ARB, chlorthalidone or atorvastatin or eliquis.

## 2022-08-26 NOTE — DISCUSSION/SUMMARY
[FreeTextEntry1] : The patient is a 66-year-old gentleman GERD, prediabetes, HTN, HLD, Afib awaiting prostate biopsy whose BP markedly improved despite his self medication.\par #1 Afib- c/w metoprolol, eliquis 5mg bid - not taking but should\par #2 Htn-  resume chlorthalidone,  c/w amlodipine 10 mg, spironolactone 25mg, minoxidil,  hydralazine, (not taking losartan either) renal sono normal, w/u in progress, f/u Dr. Pride as well, labs next visit for renal fxn and K\par #3 LIpids- c/w atorvastatin\par  #4 Hyperglycemia- We discussed adherence to a low fat low cholesterol, ADA diet, weight loss and regular daily exercise. \par #5 GERD- on protonix \par  #6 - prostate biopsy results pending [EKG obtained to assist in diagnosis and management of assessed problem(s)] : EKG obtained to assist in diagnosis and management of assessed problem(s)

## 2022-09-02 ENCOUNTER — APPOINTMENT (OUTPATIENT)
Dept: UROLOGY | Facility: CLINIC | Age: 67
End: 2022-09-02

## 2022-09-02 VITALS
WEIGHT: 270 LBS | BODY MASS INDEX: 31.24 KG/M2 | HEART RATE: 81 BPM | RESPIRATION RATE: 15 BRPM | DIASTOLIC BLOOD PRESSURE: 88 MMHG | SYSTOLIC BLOOD PRESSURE: 160 MMHG | HEIGHT: 78 IN

## 2022-09-02 VITALS
HEART RATE: 86 BPM | DIASTOLIC BLOOD PRESSURE: 87 MMHG | WEIGHT: 270 LBS | BODY MASS INDEX: 31.24 KG/M2 | HEIGHT: 78 IN | SYSTOLIC BLOOD PRESSURE: 155 MMHG | TEMPERATURE: 98.3 F

## 2022-09-02 LAB — CORE LAB BIOPSY: NORMAL

## 2022-09-02 PROCEDURE — 99214 OFFICE O/P EST MOD 30 MIN: CPT

## 2022-09-08 ENCOUNTER — APPOINTMENT (OUTPATIENT)
Dept: OTHER | Facility: CLINIC | Age: 67
End: 2022-09-08

## 2022-09-08 DIAGNOSIS — Z03.89 ENCOUNTER FOR OBSERVATION FOR OTHER SUSPECTED DISEASES AND CONDITIONS RULED OUT: ICD-10-CM

## 2022-09-08 PROCEDURE — 99442: CPT | Mod: 95

## 2022-09-11 ENCOUNTER — FORM ENCOUNTER (OUTPATIENT)
Age: 67
End: 2022-09-11

## 2022-09-13 ENCOUNTER — APPOINTMENT (OUTPATIENT)
Dept: UROLOGY | Facility: CLINIC | Age: 67
End: 2022-09-13

## 2022-09-13 ENCOUNTER — FORM ENCOUNTER (OUTPATIENT)
Age: 67
End: 2022-09-13

## 2022-09-13 VITALS
HEIGHT: 78 IN | WEIGHT: 270 LBS | DIASTOLIC BLOOD PRESSURE: 100 MMHG | TEMPERATURE: 98 F | RESPIRATION RATE: 15 BRPM | BODY MASS INDEX: 31.24 KG/M2 | HEART RATE: 63 BPM | SYSTOLIC BLOOD PRESSURE: 170 MMHG

## 2022-09-13 PROCEDURE — 99214 OFFICE O/P EST MOD 30 MIN: CPT

## 2022-09-13 NOTE — HISTORY OF PRESENT ILLNESS
[FreeTextEntry1] : Very pleasant 66-year-old gentleman who presents for follow-up of prostate cancer.  He recently underwent a transperineal prostate biopsy which demonstrated Stoddard group grade 2 and Sulaiman group grade 1 prostate cancer in multiple cores on the right side of the prostate.  He presents today to discuss additional treatment options.  He reports blood in his semen.  This was very bothersome to him.  He reports that it is becoming less with ejaculate.

## 2022-09-13 NOTE — ASSESSMENT
[FreeTextEntry1] : We had a long discussion with the patient explaining the diagnosis of prostate cancer and the meaning of his Sulaiman Grade. We discussed all options of therapy, including observation, radiation therapy, endocrine therapy, and radical surgical extirpation.\par We discussed tumor progression and the natural course of prostate cancer, including metastases, especially to bone, and that observation would allow his prostate cancer to progress. We discussed radiation therapy, both external beam and interstitial radioactive seeds, and the morbidity involved, including cystitis, proctitis, urinary and incontinence and erectile dysfunction. We discussed endocrine therapy, that it would likely slow but not stop cancer progression, and the results of androgen ablation, including but not limited to bone density loss, muscle mass loss, changes in libido, mood, energy and depression. We discussed surgical therapy, including surgical techniques, outcomes and potential need for subsequent additional radiation therapy if positive surgical margins, and complications including infection and blood loss, and long term complications including urinary incontinence and erectile dysfunction\par  \par Explained to the patient that based on his grade of prostate cancer and overall health status would recommend either radiation therapy or surgery. \par  \par Gave him material from [facility] for review. Appropriate referral were made. reviewed importance of following up with all referrals and exams\par  \par Greater than 50% of the encounter time was spent counseling the patient and I have spent 30 minutes face to face time with the patient. All questions were answered.\par

## 2022-09-13 NOTE — ASSESSMENT
[FreeTextEntry1] : Very pleasant 66-year-old gentleman who presents for follow-up of prostate cancer, hematospermia\par -We discussed the usual benign and self-limited nature of hematospermia\par -Biopsy results reviewed with patient in detail demonstrating [place] group grade 2 initially reported 1 prostate cancer in multiple cores in the right side of prostate\par -Bone scan to evaluate for metastatic disease\par -CT scan to evaluate for metastatic disease\par -We had an extensive discussion regarding different treatment options for prostate cancer.  We will further discuss this after results of bone scan and CT scan

## 2022-09-15 ENCOUNTER — APPOINTMENT (OUTPATIENT)
Dept: UROLOGY | Facility: CLINIC | Age: 67
End: 2022-09-15

## 2022-09-15 ENCOUNTER — RESULT REVIEW (OUTPATIENT)
Age: 67
End: 2022-09-15

## 2022-09-26 ENCOUNTER — APPOINTMENT (OUTPATIENT)
Dept: NUCLEAR MEDICINE | Facility: IMAGING CENTER | Age: 67
End: 2022-09-26

## 2022-09-26 ENCOUNTER — APPOINTMENT (OUTPATIENT)
Dept: CT IMAGING | Facility: IMAGING CENTER | Age: 67
End: 2022-09-26

## 2022-09-26 ENCOUNTER — NON-APPOINTMENT (OUTPATIENT)
Age: 67
End: 2022-09-26

## 2022-09-26 ENCOUNTER — OUTPATIENT (OUTPATIENT)
Dept: OUTPATIENT SERVICES | Facility: HOSPITAL | Age: 67
LOS: 1 days | End: 2022-09-26
Payer: COMMERCIAL

## 2022-09-26 DIAGNOSIS — Z98.890 OTHER SPECIFIED POSTPROCEDURAL STATES: Chronic | ICD-10-CM

## 2022-09-26 DIAGNOSIS — T14.8 OTHER INJURY OF UNSPECIFIED BODY REGION: Chronic | ICD-10-CM

## 2022-09-26 DIAGNOSIS — C61 MALIGNANT NEOPLASM OF PROSTATE: ICD-10-CM

## 2022-09-26 PROCEDURE — 74177 CT ABD & PELVIS W/CONTRAST: CPT

## 2022-09-26 PROCEDURE — 74177 CT ABD & PELVIS W/CONTRAST: CPT | Mod: 26

## 2022-09-26 PROCEDURE — A9561: CPT

## 2022-09-26 PROCEDURE — 78306 BONE IMAGING WHOLE BODY: CPT | Mod: 26

## 2022-09-26 PROCEDURE — 78306 BONE IMAGING WHOLE BODY: CPT

## 2022-09-27 ENCOUNTER — APPOINTMENT (OUTPATIENT)
Dept: CARDIOLOGY | Facility: CLINIC | Age: 67
End: 2022-09-27
Payer: COMMERCIAL

## 2022-09-27 VITALS
DIASTOLIC BLOOD PRESSURE: 77 MMHG | BODY MASS INDEX: 31.24 KG/M2 | HEART RATE: 83 BPM | OXYGEN SATURATION: 98 % | SYSTOLIC BLOOD PRESSURE: 134 MMHG | HEIGHT: 78 IN | WEIGHT: 270 LBS

## 2022-09-27 PROCEDURE — 99213 OFFICE O/P EST LOW 20 MIN: CPT

## 2022-09-28 ENCOUNTER — NON-APPOINTMENT (OUTPATIENT)
Age: 67
End: 2022-09-28

## 2022-09-30 NOTE — DISCUSSION/SUMMARY
[FreeTextEntry1] : The patient is a 66-year-old gentleman GERD, prediabetes, HTN, HLD, Afib diagnosed with prostate cancer, BP finally under control.\par #1 Afib- c/w metoprolol, eliquis 5mg bid - not taking but should\par #2 Htn-  c/w chlorthalidone,  amlodipine 10 mg, spironolactone 25mg, minoxidil,  hydralazine, (not taking losartan either) renal sono normal, w/u in progress, f/u Dr. Pride as wel\par #3 LIpids- c/w atorvastatin\par  #4 Hyperglycemia- We discussed adherence to a low fat low cholesterol, ADA diet, weight loss and regular daily exercise. \par #5 GERD- on protonix \par  #6 - prostate cancer, treatment option pending

## 2022-10-12 ENCOUNTER — APPOINTMENT (OUTPATIENT)
Dept: UROLOGY | Facility: CLINIC | Age: 67
End: 2022-10-12

## 2022-10-12 VITALS
BODY MASS INDEX: 31.24 KG/M2 | OXYGEN SATURATION: 98 % | TEMPERATURE: 97.9 F | HEIGHT: 78 IN | SYSTOLIC BLOOD PRESSURE: 167 MMHG | HEART RATE: 76 BPM | DIASTOLIC BLOOD PRESSURE: 90 MMHG | WEIGHT: 270 LBS

## 2022-10-12 PROCEDURE — 99214 OFFICE O/P EST MOD 30 MIN: CPT

## 2022-10-12 NOTE — DISEASE MANAGEMENT
[1] : T1 [c] : c [0] : M0 [0-10] : 0 -10 ng/mL [Biopsy with Fusion] : Patient had a biopsy with fusion on [7(3+4)] : Fusion Biopsy [place] Score: 7(3+4) [] : Patient had a bone scan [4] : 4 [BiopsyDate] : 08/22 [TotalCores] : 1 [TotalPositiveCores] : 6 [MaxCoreInvolvement] : 60 [CTresults] : Negative [BoneScanResults] : Negative [IIB] : IIB

## 2022-10-12 NOTE — HISTORY OF PRESENT ILLNESS
[FreeTextEntry1] : Very pleasant 66-year-old gentleman presents for follow-up of prostate cancer.  He previously underwent a prostate biopsy demonstrating Sulaiman group grade 2 prostate cancer in multiple cores.  Because of this he underwent a CT scan and a bone scan which demonstrated no evidence of metastatic disease.  He presents today to discuss these results as well as further management options.

## 2022-10-13 ENCOUNTER — APPOINTMENT (OUTPATIENT)
Dept: SURGERY | Facility: CLINIC | Age: 67
End: 2022-10-13

## 2022-10-13 VITALS
BODY MASS INDEX: 31.24 KG/M2 | WEIGHT: 270 LBS | DIASTOLIC BLOOD PRESSURE: 84 MMHG | SYSTOLIC BLOOD PRESSURE: 153 MMHG | HEART RATE: 50 BPM | HEIGHT: 78 IN

## 2022-10-13 VITALS — TEMPERATURE: 97.3 F

## 2022-10-13 DIAGNOSIS — L91.0 HYPERTROPHIC SCAR: ICD-10-CM

## 2022-10-13 PROCEDURE — 11900 INJECT SKIN LESIONS </W 7: CPT

## 2022-10-13 PROCEDURE — 99213 OFFICE O/P EST LOW 20 MIN: CPT | Mod: 25

## 2022-10-13 NOTE — REASON FOR VISIT
[Follow-Up: _____] : a [unfilled] follow-up visit [FreeTextEntry1] : S/P Repair of recurrent incarcerated left inguinal hernia 03/19/21

## 2022-10-13 NOTE — PLAN
[FreeTextEntry1] : Intralesional Injection with Triamcinolone - 40 mg/1 ML, Lidocaine 1% to affected area \par Patient tolerated procedure well\par \par patient will follow up if needed. Warning signs, follow up, and restrictions were discussed with the patient.\par \par Patient's questions and concerns addressed to their satisfaction, and patient verbalized an understanding of the information discussed.\par

## 2022-10-13 NOTE — ASSESSMENT
[FreeTextEntry1] : Mr. HERNANDEZ is a 66 year y/o M with history of left inguinal hernia repair, and had developed a thickened, hypertrophic scar to the area which is giving him symptoms.

## 2022-10-13 NOTE — HISTORY OF PRESENT ILLNESS
[de-identified] : Mr. MARIA EUGENIA HERNANDEZ is a 66 year y.o M who presents to the office w the cc of having a skin lesion/scar to previous surgical site. He is S/P Repair of recurrent incarcerated left inguinal hernia 03/19/21. Patient wants to have the site checked, stating he has a lot of itching. \par Patient states he is recently diagnosed with Prostate CA and follows up with Dr. Matson.

## 2022-10-27 ENCOUNTER — FORM ENCOUNTER (OUTPATIENT)
Age: 67
End: 2022-10-27

## 2022-10-30 ENCOUNTER — FORM ENCOUNTER (OUTPATIENT)
Age: 67
End: 2022-10-30

## 2022-11-01 ENCOUNTER — APPOINTMENT (OUTPATIENT)
Dept: RADIATION ONCOLOGY | Facility: CLINIC | Age: 67
End: 2022-11-01

## 2022-11-01 ENCOUNTER — NON-APPOINTMENT (OUTPATIENT)
Age: 67
End: 2022-11-01

## 2022-11-01 DIAGNOSIS — Z85.46 PERSONAL HISTORY OF MALIGNANT NEOPLASM OF PROSTATE: ICD-10-CM

## 2022-11-01 PROCEDURE — 99204 OFFICE O/P NEW MOD 45 MIN: CPT | Mod: 25

## 2022-11-06 NOTE — DISEASE MANAGEMENT
[1] : T1 [c] : c [0] : M0 [0-10] : 0 -10 ng/mL [Biopsy with Fusion] : Patient had a biopsy with fusion on [7(3+4)] : Fusion Biopsy [place] Score: 7(3+4) [] : Patient had a Prostate MRI [4] : 4 [IIB] : IIB [BiopsyDate] : 08/22 [TotalCores] : 13 [TotalPositiveCores] : 6 [MaxCoreInvolvement] : 60 [CTresults] : Negative [BoneScanResults] : Negative

## 2022-11-06 NOTE — END OF VISIT
[] : Resident [FreeTextEntry3] : Agree with above. Side effects discussed and include but not limited to necrosis, dysuria, GI and  toxicity, erectile dysfunction, bleeding, fatigue. Patient understood and will let us know what he decides.  [Time Spent: ___ minutes] : I have spent [unfilled] minutes of time on the encounter. [>50% of the face to face encounter time was spent on counseling and/or coordination of care for ___] : Greater than 50% of the face to face encounter time was spent on counseling and/or coordination of care for [unfilled]

## 2022-11-06 NOTE — HISTORY OF PRESENT ILLNESS
[FreeTextEntry1] : Very pleasant 66-year-old gentleman presents for follow-up of prostate cancer, had PSA 6.97 in 2/22. \par -Prostate biopsy demonstrates Sulaiman group grade 2 prostate cancer in multiple cores ( 3+4=7 in 2 Us guided and one targetted core, 3+3=6 in 3 cores, total 12 Us guided and onetargetted cores biopsied. total 6/13 cores.\par -Prostate MRI demonstrates an overall suspicion score of PI-RADS 4 in the setting of a 58 g prostate gland, no RUBEN/SVI\par -Bone scan negative\par -CT images reviewed-negative\par -We had an extensive discussion regarding potential treatment options for prostate cancer including radiation, surgery, active surveillance, focal therapies, and at this time he is interested in either surgery or radiation\par -We discussed the differences between surgery and radiation, including the treatment as well as prognosis and long-term as well as short-term effects from the treatments\par

## 2022-11-09 ENCOUNTER — APPOINTMENT (OUTPATIENT)
Dept: UROLOGY | Facility: CLINIC | Age: 67
End: 2022-11-09

## 2022-11-09 PROCEDURE — 99215 OFFICE O/P EST HI 40 MIN: CPT

## 2022-11-09 NOTE — DISEASE MANAGEMENT
[1] : T1 [c] : c [X] : MX [10-20] : 10 - 20 ng/mL [7(3+4)] : Fusion Biopsy [place] Score: 7(3+4) [] : Patient had a Prostate MRI [4] : 4 [IIB] : IIB [BiopsyDate] : 8/16/2022 [MeasuredProstateVolume] : 58 [TotalCores] : 16 [TotalPositiveCores] : 7 [MaxCoreInvolvement] : 60

## 2022-11-09 NOTE — HISTORY OF PRESENT ILLNESS
[FreeTextEntry1] : Today we discussed the natural history of localized prostate cancer, and the heterogeneous biology of this malignancy.  We discussed the fact that many prostate cancers are slow growing and unlikely to metastasize or cause death, whereas others can be life threatening.  We reviewed risk stratification, staging, T[place]on scoring, and PSA levels as they pertain to risk.  \par \par PSA = 6\par Clinical Stage = T1\par Gleasons 3+4 6/13 areas mainly on the right side\par Prostate 50 grams no nodules or induration\par Low Risk prostate cancer AS candidate\par Bone Scan Negative \par MRI No extension to SV or nodes \par \par All treatment options were reviewed.  This included active surveillance, androgen deprivation, emerging options such as focal therapy/HIFU/cryotherapy, radiation options (including IMRT, SBRT, brachytherapy) and surgical options (open vs. robotic surgery, nerve vs. non-nerve sparing).  Oncologic outcomes were compared and contrasted.  Risks of biochemical and clinical recurrence discussed.  Risks of needing adjuvant or salvage treatments reviewed.  We discussed the risks of secondary malignancy after radiation.  We discussed the opportunity for pathological staging with surgery vs. other options.  We discussed the possibility of positive margins, treatment failure, cancer recurrence and cancer-related death even with treatment. \par \par All potential side effects of treatment were reviewed including, but not limited to: short term or permanent stress urinary incontinence and/or short term or permanent erectile dysfunction, penile shortening, rectal symptoms/pain, perineal pain, and other side effects. \par \par All potential complications of treatment and surgery were reviewed including, but not limited to: risks of conversion from MIS to open surgery discussed, bleeding//life-threatening hemorrhage, rectal injury requiring colostomy or delayed recognition leading to fistula, vascular/bowel/adjacent visceral organ injury, trocar/access injury, the possibility of recognized vs. unrecognized/delayed-recognition injury, risks of thermal/blunt/sharp/retraction injury, risks of DVT, PE, MI, death, risks of cardiopulmonary/anesthesia related complications, positional injury, infection/collection/abscess, wound complications/dehiscence/seroma/cellulitis, urinoma/fistula, ureteral injury/obstruction, bladder neck contracture, penile shortening, meatal stenosis, urethral stricture, lymphocele, obturator nerve injury, prolonged anastomotic leak, and other complications. \par \par \par \par \par \par \par \par \par \par \par

## 2022-11-10 ENCOUNTER — NON-APPOINTMENT (OUTPATIENT)
Age: 67
End: 2022-11-10

## 2022-11-10 LAB — PSA SERPL-MCNC: 6.67 NG/ML

## 2022-11-11 ENCOUNTER — APPOINTMENT (OUTPATIENT)
Dept: UROLOGY | Facility: CLINIC | Age: 67
End: 2022-11-11

## 2022-11-11 VITALS
HEIGHT: 78 IN | HEART RATE: 95 BPM | TEMPERATURE: 97.7 F | DIASTOLIC BLOOD PRESSURE: 84 MMHG | BODY MASS INDEX: 31.24 KG/M2 | WEIGHT: 270 LBS | OXYGEN SATURATION: 99 % | SYSTOLIC BLOOD PRESSURE: 150 MMHG

## 2022-11-11 PROCEDURE — 99214 OFFICE O/P EST MOD 30 MIN: CPT

## 2022-11-11 NOTE — HISTORY OF PRESENT ILLNESS
[FreeTextEntry1] : Mr. Dan is a very pleasant 66 year old man here today for prostate cancer.\par He reports feeling well for the most part.\par Denies any hematuria, dysuria, or urinary retention.\par Is here to discuss surgery vs. radiation.

## 2022-11-11 NOTE — DISEASE MANAGEMENT
[1] : T1 [c] : c [0] : M0 [0-10] : 0 -10 ng/mL [Biopsy with Fusion] : Patient had a biopsy with fusion on [7(3+4)] : Fusion Biopsy [place] Score: 7(3+4) [] : Patient had a bone scan [4] : 4 [IIB] : IIB [BiopsyDate] : 08/22 [TotalCores] : 1 [TotalPositiveCores] : 6 [MaxCoreInvolvement] : 60 [CTresults] : Negative [BoneScanResults] : Negative

## 2022-11-11 NOTE — DATA REVIEWED
[FreeTextEntry1] : Mr. Dan is a very pleasant 66 year old man here today for prostate cancer.\par He reports feeling well for the most part.\par Denies any hematuria, dysuria, or urinary retention.\par Reports clear ejaculate at this time; no longer hematospermia.\par PSA from 11/9/22 6.67.\par Is leaning towards radiation at this time but is still not 100% sure.\par

## 2022-11-30 ENCOUNTER — APPOINTMENT (OUTPATIENT)
Dept: UROLOGY | Facility: CLINIC | Age: 67
End: 2022-11-30

## 2022-11-30 ENCOUNTER — APPOINTMENT (OUTPATIENT)
Dept: RADIATION ONCOLOGY | Facility: CLINIC | Age: 67
End: 2022-11-30

## 2022-11-30 VITALS
OXYGEN SATURATION: 99 % | DIASTOLIC BLOOD PRESSURE: 80 MMHG | TEMPERATURE: 98 F | SYSTOLIC BLOOD PRESSURE: 149 MMHG | BODY MASS INDEX: 31.47 KG/M2 | HEART RATE: 96 BPM | WEIGHT: 272 LBS | HEIGHT: 78 IN

## 2022-11-30 VITALS — RESPIRATION RATE: 18 BRPM | BODY MASS INDEX: 31.24 KG/M2 | WEIGHT: 270 LBS | HEIGHT: 78 IN

## 2022-11-30 PROCEDURE — 99214 OFFICE O/P EST MOD 30 MIN: CPT

## 2022-11-30 NOTE — DISEASE MANAGEMENT
[1] : T1 [c] : c [0] : M0 [0-10] : 0 -10 ng/mL [Biopsy with Fusion] : Patient had a biopsy with fusion on [7(3+4)] : Fusion Biopsy [place] Score: 7(3+4) [] : Patient had a bone scan [4] : 4 [BiopsyDate] : 08/22 [TotalCores] : 1 [TotalPositiveCores] : 6 [MaxCoreInvolvement] : 60 [CTresults] : Negative [BoneScanResults] : Negative [IIB] : IIB

## 2022-11-30 NOTE — HISTORY OF PRESENT ILLNESS
[FreeTextEntry1] : Very pleasant 66-year-old gentleman who presents for follow-up of prostate cancer.  He feels well.  He denies dysuria.  No hematuria.  No flank pain or suprapubic pain.  He previously underwent a prostate biopsy demonstrating Sulaiman group grade 2 prostate cancer in multiple cores.  Bone scan was negative and MRI demonstrated overall suspicion score of PI-RADS 4.  He has seen both a surgeon and radiation oncologist and wishes to proceed with radiation.  He presents today to discuss this.

## 2022-12-01 ENCOUNTER — FORM ENCOUNTER (OUTPATIENT)
Age: 67
End: 2022-12-01

## 2022-12-06 NOTE — REVIEW OF SYSTEMS
[Negative] : Allergic/Immunologic [FreeTextEntry5] : HTN [FreeTextEntry8] : prostate cancer [FreeTextEntry9] : h/o surgery to left knee, left shoulder, hernia.
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
## 2022-12-07 ENCOUNTER — TRANSCRIPTION ENCOUNTER (OUTPATIENT)
Age: 67
End: 2022-12-07

## 2023-01-18 ENCOUNTER — OUTPATIENT (OUTPATIENT)
Dept: OUTPATIENT SERVICES | Facility: HOSPITAL | Age: 68
LOS: 1 days | Discharge: HOME | End: 2023-01-18
Payer: COMMERCIAL

## 2023-01-18 DIAGNOSIS — T14.8 OTHER INJURY OF UNSPECIFIED BODY REGION: Chronic | ICD-10-CM

## 2023-01-18 DIAGNOSIS — Z98.890 OTHER SPECIFIED POSTPROCEDURAL STATES: Chronic | ICD-10-CM

## 2023-01-18 PROCEDURE — 77301 RADIOTHERAPY DOSE PLAN IMRT: CPT | Mod: 26

## 2023-01-18 PROCEDURE — 77338 DESIGN MLC DEVICE FOR IMRT: CPT | Mod: 26

## 2023-01-18 PROCEDURE — 77300 RADIATION THERAPY DOSE PLAN: CPT | Mod: 26

## 2023-01-25 PROCEDURE — 77014: CPT | Mod: 26

## 2023-01-26 ENCOUNTER — NON-APPOINTMENT (OUTPATIENT)
Age: 68
End: 2023-01-26

## 2023-01-26 ENCOUNTER — APPOINTMENT (OUTPATIENT)
Dept: CARDIOLOGY | Facility: CLINIC | Age: 68
End: 2023-01-26

## 2023-01-26 PROCEDURE — 77014: CPT | Mod: 26

## 2023-01-26 NOTE — PHYSICAL EXAM
[Sclera] : the sclera and conjunctiva were normal [Extraocular Movements] : extraocular movements were intact [Outer Ear] : the ears and nose were normal in appearance [Hearing Threshold Finger Rub Not [place]] : hearing was normal [Respiration, Rhythm And Depth] : normal respiratory rhythm and effort [Exaggerated Use Of Accessory Muscles For Inspiration] : no accessory muscle use [Abdomen Soft] : soft [Nondistended] : nondistended [Nail Clubbing] : no clubbing  or cyanosis of the fingernails [Range of Motion to Joints] : range of motion to joints [Skin Color & Pigmentation] : normal skin color and pigmentation [] : no rash [No Focal Deficits] : no focal deficits [Motor Exam] : the motor exam was normal [Oriented To Time, Place, And Person] : oriented to person, place, and time [Affect] : the affect was normal [General Appearance - Well Developed] : well developed [General Appearance - Well Nourished] : well nourished [General Appearance - Alert] : alert [General Appearance - In No Acute Distress] : in no acute distress [Arterial Pulses Normal] : the arterial pulses were normal

## 2023-01-26 NOTE — DISEASE MANAGEMENT
[Clinical] : TNM Stage: c [IIB] : IIB [TTNM] : 1c [NTNM] : 0 [MTNM] : 0 [de-identified] : 110 [de-identified] : 8237 [de-identified] : prostate/seminal vesicles
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
## 2023-01-26 NOTE — REVIEW OF SYSTEMS
[Constipation: Grade 0] : Constipation: Grade 0 [Diarrhea: Grade 0] : Diarrhea: Grade 0 [Fatigue: Grade 0] : Fatigue: Grade 0 [Hematuria: Grade 0] : Hematuria: Grade 0 [Urinary Incontinence: Grade 0] : Urinary Incontinence: Grade 0  [Urinary Tract Pain: Grade 0] : Urinary Tract Pain: Grade 0 [Dermatitis Radiation: Grade 0] : Dermatitis Radiation: Grade 0 [Urinary Urgency: Grade 1 - Present] : Urinary Urgency: Grade 1 - Present [Urinary Frequency: Grade 1 - Present] : Urinary Frequency: Grade 1 - Present

## 2023-01-27 PROCEDURE — 77014: CPT | Mod: 26

## 2023-01-30 PROCEDURE — 77014: CPT | Mod: 26

## 2023-01-31 PROCEDURE — 77014: CPT | Mod: 26

## 2023-01-31 PROCEDURE — 77427 RADIATION TX MANAGEMENT X5: CPT

## 2023-02-01 PROCEDURE — 77014: CPT | Mod: 26

## 2023-02-02 ENCOUNTER — RX RENEWAL (OUTPATIENT)
Age: 68
End: 2023-02-02

## 2023-02-02 ENCOUNTER — NON-APPOINTMENT (OUTPATIENT)
Age: 68
End: 2023-02-02

## 2023-02-02 PROCEDURE — 77014: CPT | Mod: 26

## 2023-02-02 NOTE — PHYSICAL EXAM
[Sclera] : the sclera and conjunctiva were normal [Extraocular Movements] : extraocular movements were intact [Outer Ear] : the ears and nose were normal in appearance [Hearing Threshold Finger Rub Not [place]] : hearing was normal [Respiration, Rhythm And Depth] : normal respiratory rhythm and effort [Exaggerated Use Of Accessory Muscles For Inspiration] : no accessory muscle use [Abdomen Soft] : soft [Nondistended] : nondistended [Nail Clubbing] : no clubbing  or cyanosis of the fingernails [Range of Motion to Joints] : range of motion to joints [Skin Color & Pigmentation] : normal skin color and pigmentation [] : no rash [No Focal Deficits] : no focal deficits [Motor Exam] : the motor exam was normal [Oriented To Time, Place, And Person] : oriented to person, place, and time [Affect] : the affect was normal [Normal] : well developed, well nourished, in no acute distress [Arterial Pulses Normal] : the arterial pulses were normal [Edema] : no peripheral edema present
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
## 2023-02-02 NOTE — DISEASE MANAGEMENT
[Clinical] : TNM Stage: c [IIB] : IIB [TTNM] : 1c [NTNM] : 0 [MTNM] : 0 [de-identified] : 0176 [de-identified] : 9810 [de-identified] : prostate/seminal vesicles

## 2023-02-02 NOTE — REVIEW OF SYSTEMS
[Diarrhea: Grade 0] : Diarrhea: Grade 0 [Fatigue: Grade 0] : Fatigue: Grade 0 [Hematuria: Grade 0] : Hematuria: Grade 0 [Urinary Incontinence: Grade 0] : Urinary Incontinence: Grade 0  [Urinary Urgency: Grade 1 - Present] : Urinary Urgency: Grade 1 - Present [Urinary Frequency: Grade 1 - Present] : Urinary Frequency: Grade 1 - Present [Dermatitis Radiation: Grade 0] : Dermatitis Radiation: Grade 0 [Urinary Tract Pain: Grade 1 - Mild pain] : Urinary Tract Pain: Grade 1 - Mild pain [Constipation: Grade 1 - Occasional or intermittent symptoms; occasional use of stool softeners, laxatives, dietary modification, or enema] : Constipation: Grade 1 - Occasional or intermittent symptoms; occasional use of stool softeners, laxatives, dietary modification, or enema

## 2023-02-03 PROCEDURE — 77014: CPT | Mod: 26

## 2023-02-06 ENCOUNTER — APPOINTMENT (OUTPATIENT)
Dept: GASTROENTEROLOGY | Facility: CLINIC | Age: 68
End: 2023-02-06
Payer: COMMERCIAL

## 2023-02-06 VITALS
DIASTOLIC BLOOD PRESSURE: 128 MMHG | OXYGEN SATURATION: 99 % | TEMPERATURE: 97.3 F | HEART RATE: 133 BPM | WEIGHT: 272 LBS | BODY MASS INDEX: 31.47 KG/M2 | HEIGHT: 78 IN | SYSTOLIC BLOOD PRESSURE: 229 MMHG

## 2023-02-06 DIAGNOSIS — K31.A0 GASTRIC INTESTINAL METAPLASIA, UNSPECIFIED: ICD-10-CM

## 2023-02-06 PROCEDURE — 77014: CPT | Mod: 26

## 2023-02-06 PROCEDURE — 99213 OFFICE O/P EST LOW 20 MIN: CPT
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
## 2023-02-06 NOTE — ASSESSMENT
[FreeTextEntry1] : Dyspepsia: The patient complains of dyspeptic symptoms.  The patient was advised to continue to abide by an anti-gas (low FOD-MAP) diet.  The patient was previously given a pamphlet for anti-gas (low FOD-MAP).  The patient and I reviewed the anti-gas (low FOD-MAP)diet at length again. The patient is to continue on a trial of Simethicone one tablet 4 times a day p.r.n. abdominal pain and gas.\par GERD: The patient was advised to avoid late-night meals and dietary indiscretions.  The patient was advised to avoid fried and fatty foods.  The patient was advised to abide by an anti-GERD diet. The patient was given a pamphlet for anti-GERD.  The patient and I reviewed the anti-GERD diet at length. I recommend a trial of Pantoprazole 40 mg once a day x 3 months for the symptoms.\par Gastritis: The patient has a history of gastritis. The patient is to avoid nonsteroidal anti-inflammatory drugs and aspirin. I recommend continue on a trial of pantoprazole 40 mg once a day. \par Intestinal Metaplasia of the Stomach: The patient was previously found to have intestinal metaplasia of the stomach on prior upper endoscopy. The findings of intestinal metaplasia of the stomach have an increased risk of gastric cancer. Prior biopsies did not reveal dysplasia. I recommend a repeat upper endoscopy with mapping in 2 years to reassess for intestinal metaplasia and dysplasia unless symptomatic. The patient is aware of the increased risk of intestinal metaplasia and progression to stomach cancer. The patient agrees to follow-up.\par H. pylori Gastritis: The patient was previously found to have H. pylori gastritis on prior upper endoscopy. The patient completed a trial of Clarithromycin 500 mg 2 times a day, Amoxicillin 500mg 2 tablets twice a day and Omeprazole 40 mg twice a day for 14 days for H. pylori eradication. The H. pylori breath test performed on February 9, 2021 was not detected.\par Prostate Cancer: I recommend continue followup with urologist, Dr. Mal Matson to assess and treat the prostate cancer.  The patient is to continue radiation therapy week 2 of 4 weeks.\par Follow-up: The patient is to follow-up in the office in 6 months to reassess the symptoms. The patient was told to call the office if any further problems. \par

## 2023-02-07 PROCEDURE — 77014: CPT | Mod: 26

## 2023-02-07 PROCEDURE — 77427 RADIATION TX MANAGEMENT X5: CPT

## 2023-02-08 PROCEDURE — 77014: CPT | Mod: 26

## 2023-02-09 ENCOUNTER — NON-APPOINTMENT (OUTPATIENT)
Age: 68
End: 2023-02-09

## 2023-02-09 PROCEDURE — 77014: CPT | Mod: 26

## 2023-02-09 NOTE — REVIEW OF SYSTEMS
[Diarrhea: Grade 0] : Diarrhea: Grade 0 [Hematuria: Grade 0] : Hematuria: Grade 0 [Urinary Incontinence: Grade 0] : Urinary Incontinence: Grade 0  [Urinary Tract Pain: Grade 1 - Mild pain] : Urinary Tract Pain: Grade 1 - Mild pain [Urinary Urgency: Grade 1 - Present] : Urinary Urgency: Grade 1 - Present [Urinary Frequency: Grade 1 - Present] : Urinary Frequency: Grade 1 - Present [Dermatitis Radiation: Grade 0] : Dermatitis Radiation: Grade 0 [Constipation: Grade 0] : Constipation: Grade 0 [Rectal Pain: Grade 1 - Mild pain] : Rectal Pain: Grade 1 - Mild pain [Fatigue: Grade 1 - Fatigue relieved by rest] : Fatigue: Grade 1 - Fatigue relieved by rest
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
## 2023-02-09 NOTE — DISEASE MANAGEMENT
[Clinical] : TNM Stage: c [IIB] : IIB [TTNM] : 1c [NTNM] : 0 [MTNM] : 0 [de-identified] : 3000 cGy [de-identified] : 4414 [de-identified] : prostate/seminal vesicles

## 2023-02-09 NOTE — PHYSICAL EXAM
[Normal] : well developed, well nourished, in no acute distress [Sclera] : the sclera and conjunctiva were normal [Extraocular Movements] : extraocular movements were intact [Outer Ear] : the ears and nose were normal in appearance [Hearing Threshold Finger Rub Not [place]] : hearing was normal [Respiration, Rhythm And Depth] : normal respiratory rhythm and effort [Exaggerated Use Of Accessory Muscles For Inspiration] : no accessory muscle use [Arterial Pulses Normal] : the arterial pulses were normal [Edema] : no peripheral edema present [Abdomen Soft] : soft [Nondistended] : nondistended [Nail Clubbing] : no clubbing  or cyanosis of the fingernails [Range of Motion to Joints] : range of motion to joints [Skin Color & Pigmentation] : normal skin color and pigmentation [] : no rash [No Focal Deficits] : no focal deficits [Motor Exam] : the motor exam was normal [Oriented To Time, Place, And Person] : oriented to person, place, and time [Affect] : the affect was normal

## 2023-02-10 PROCEDURE — 77014: CPT | Mod: 26

## 2023-02-13 PROCEDURE — 77014: CPT | Mod: 26

## 2023-02-14 PROCEDURE — 77014: CPT | Mod: 26

## 2023-02-14 PROCEDURE — 77427 RADIATION TX MANAGEMENT X5: CPT

## 2023-02-15 PROCEDURE — 77014: CPT | Mod: 26

## 2023-02-16 ENCOUNTER — NON-APPOINTMENT (OUTPATIENT)
Age: 68
End: 2023-02-16

## 2023-02-16 PROCEDURE — 77014: CPT | Mod: 26

## 2023-02-16 NOTE — REVIEW OF SYSTEMS
[Constipation: Grade 0] : Constipation: Grade 0 [Diarrhea: Grade 0] : Diarrhea: Grade 0 [Rectal Pain: Grade 1 - Mild pain] : Rectal Pain: Grade 1 - Mild pain [Fatigue: Grade 1 - Fatigue relieved by rest] : Fatigue: Grade 1 - Fatigue relieved by rest [Hematuria: Grade 0] : Hematuria: Grade 0 [Urinary Incontinence: Grade 0] : Urinary Incontinence: Grade 0  [Urinary Tract Pain: Grade 1 - Mild pain] : Urinary Tract Pain: Grade 1 - Mild pain [Urinary Urgency: Grade 2 - Limiting instrumental ADL; medical management indicated] : Urinary Urgency: Grade 2 - Limiting instrumental ADL; medical management indicated [Urinary Frequency: Grade 2 - Limiting instrumental ADL; medical management indicated] : Urinary Frequency: Grade 2 - Limiting instrumental ADL; medical management indicated [Dermatitis Radiation: Grade 1 - Faint erythema or dry desquamation] : Dermatitis Radiation: Grade 1 - Faint erythema or dry desquamation
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
## 2023-02-16 NOTE — DISEASE MANAGEMENT
[Clinical] : TNM Stage: c [IIB] : IIB [TTNM] : 1c [NTNM] : 0 [MTNM] : 0 [de-identified] : 4250 cGy [de-identified] : 0940 [de-identified] : prostate/seminal vesicles

## 2023-02-16 NOTE — PHYSICAL EXAM
[Normal] : well developed, well nourished, in no acute distress [Sclera] : the sclera and conjunctiva were normal [Extraocular Movements] : extraocular movements were intact [Outer Ear] : the ears and nose were normal in appearance [Hearing Threshold Finger Rub Not [place]] : hearing was normal [] : no respiratory distress [Respiration, Rhythm And Depth] : normal respiratory rhythm and effort [Exaggerated Use Of Accessory Muscles For Inspiration] : no accessory muscle use [Arterial Pulses Normal] : the arterial pulses were normal [Edema] : no peripheral edema present [Abdomen Soft] : soft [Nondistended] : nondistended [Nail Clubbing] : no clubbing  or cyanosis of the fingernails [Range of Motion to Joints] : range of motion to joints [No Focal Deficits] : no focal deficits [Motor Exam] : the motor exam was normal [Oriented To Time, Place, And Person] : oriented to person, place, and time [Affect] : the affect was normal [de-identified] : patch of hypopigmentation left pubic area approx 1 cm; some keloid nearby

## 2023-02-17 PROCEDURE — 77014: CPT | Mod: 26

## 2023-02-21 PROCEDURE — 77014: CPT | Mod: 26

## 2023-02-22 PROCEDURE — 77014: CPT | Mod: 26

## 2023-02-22 PROCEDURE — 77427 RADIATION TX MANAGEMENT X5: CPT

## 2023-02-23 ENCOUNTER — NON-APPOINTMENT (OUTPATIENT)
Age: 68
End: 2023-02-23

## 2023-02-23 PROCEDURE — 77014: CPT | Mod: 26
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
## 2023-02-23 NOTE — REVIEW OF SYSTEMS
[Constipation: Grade 0] : Constipation: Grade 0 [Diarrhea: Grade 0] : Diarrhea: Grade 0 [Fatigue: Grade 1 - Fatigue relieved by rest] : Fatigue: Grade 1 - Fatigue relieved by rest [Hematuria: Grade 0] : Hematuria: Grade 0 [Urinary Incontinence: Grade 0] : Urinary Incontinence: Grade 0  [Urinary Tract Pain: Grade 1 - Mild pain] : Urinary Tract Pain: Grade 1 - Mild pain [Dermatitis Radiation: Grade 1 - Faint erythema or dry desquamation] : Dermatitis Radiation: Grade 1 - Faint erythema or dry desquamation [Rectal Pain: Grade 0] : Rectal Pain: Grade 0 [Urinary Urgency: Grade 2 - Limiting instrumental ADL; medical management indicated] : Urinary Urgency: Grade 2 - Limiting instrumental ADL; medical management indicated [Urinary Frequency: Grade 2 - Limiting instrumental ADL; medical management indicated] : Urinary Frequency: Grade 2 - Limiting instrumental ADL; medical management indicated

## 2023-02-23 NOTE — PHYSICAL EXAM
[Normal] : well developed, well nourished, in no acute distress [Sclera] : the sclera and conjunctiva were normal [Extraocular Movements] : extraocular movements were intact [Outer Ear] : the ears and nose were normal in appearance [Hearing Threshold Finger Rub Not [place]] : hearing was normal [] : no respiratory distress [Respiration, Rhythm And Depth] : normal respiratory rhythm and effort [Exaggerated Use Of Accessory Muscles For Inspiration] : no accessory muscle use [Arterial Pulses Normal] : the arterial pulses were normal [Edema] : no peripheral edema present [Abdomen Soft] : soft [Nondistended] : nondistended [Nail Clubbing] : no clubbing  or cyanosis of the fingernails [Range of Motion to Joints] : range of motion to joints [No Focal Deficits] : no focal deficits [Motor Exam] : the motor exam was normal [Oriented To Time, Place, And Person] : oriented to person, place, and time [Affect] : the affect was normal

## 2023-02-23 NOTE — DISEASE MANAGEMENT
[Clinical] : TNM Stage: c [IIB] : IIB [TTNM] : 1c [NTNM] : 0 [MTNM] : 0 [de-identified] :  5250 cGy [de-identified] : 7991 [de-identified] : prostate/seminal vesicles

## 2023-02-24 PROCEDURE — 77014: CPT | Mod: 26

## 2023-02-27 PROCEDURE — 77014: CPT | Mod: 26

## 2023-02-28 PROCEDURE — 77014: CPT | Mod: 26

## 2023-03-01 ENCOUNTER — APPOINTMENT (OUTPATIENT)
Dept: UROLOGY | Facility: CLINIC | Age: 68
End: 2023-03-01
Payer: COMMERCIAL

## 2023-03-01 VITALS
DIASTOLIC BLOOD PRESSURE: 127 MMHG | SYSTOLIC BLOOD PRESSURE: 210 MMHG | BODY MASS INDEX: 31.47 KG/M2 | HEIGHT: 78 IN | HEART RATE: 51 BPM | WEIGHT: 272 LBS | OXYGEN SATURATION: 98 % | TEMPERATURE: 94.1 F

## 2023-03-01 DIAGNOSIS — R97.20 ELEVATED PROSTATE, SPECIFIC ANTIGEN [PSA]: ICD-10-CM

## 2023-03-01 PROCEDURE — 99214 OFFICE O/P EST MOD 30 MIN: CPT

## 2023-03-01 PROCEDURE — 77014: CPT | Mod: 26

## 2023-03-01 PROCEDURE — 77427 RADIATION TX MANAGEMENT X5: CPT
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
## 2023-03-02 ENCOUNTER — NON-APPOINTMENT (OUTPATIENT)
Age: 68
End: 2023-03-02

## 2023-03-02 PROCEDURE — 77014: CPT | Mod: 26

## 2023-03-02 NOTE — DISEASE MANAGEMENT
[Clinical] : TNM Stage: c [IIB] : IIB [TTNM] : 1c [NTNM] : 0 [MTNM] : 0 [de-identified] : 6500 cGy [de-identified] : 5440 [de-identified] : prostate/seminal vesicles

## 2023-03-02 NOTE — REVIEW OF SYSTEMS
[Constipation: Grade 0] : Constipation: Grade 0 [Diarrhea: Grade 0] : Diarrhea: Grade 0 [Rectal Pain: Grade 0] : Rectal Pain: Grade 0 [Fatigue: Grade 1 - Fatigue relieved by rest] : Fatigue: Grade 1 - Fatigue relieved by rest [Hematuria: Grade 0] : Hematuria: Grade 0 [Urinary Incontinence: Grade 0] : Urinary Incontinence: Grade 0  [Urinary Tract Pain: Grade 1 - Mild pain] : Urinary Tract Pain: Grade 1 - Mild pain [Urinary Urgency: Grade 2 - Limiting instrumental ADL; medical management indicated] : Urinary Urgency: Grade 2 - Limiting instrumental ADL; medical management indicated [Urinary Frequency: Grade 2 - Limiting instrumental ADL; medical management indicated] : Urinary Frequency: Grade 2 - Limiting instrumental ADL; medical management indicated [Dermatitis Radiation: Grade 1 - Faint erythema or dry desquamation] : Dermatitis Radiation: Grade 1 - Faint erythema or dry desquamation
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
## 2023-03-02 NOTE — PHYSICAL EXAM
[Normal] : well developed, well nourished, in no acute distress [Sclera] : the sclera and conjunctiva were normal [Extraocular Movements] : extraocular movements were intact [Outer Ear] : the ears and nose were normal in appearance [Hearing Threshold Finger Rub Not [place]] : hearing was normal [] : no respiratory distress [Respiration, Rhythm And Depth] : normal respiratory rhythm and effort [Exaggerated Use Of Accessory Muscles For Inspiration] : no accessory muscle use [Arterial Pulses Normal] : the arterial pulses were normal [Edema] : no peripheral edema present [Abdomen Soft] : soft [Nondistended] : nondistended [Nail Clubbing] : no clubbing  or cyanosis of the fingernails [Range of Motion to Joints] : range of motion to joints [No Focal Deficits] : no focal deficits [Motor Exam] : the motor exam was normal [Oriented To Time, Place, And Person] : oriented to person, place, and time [Affect] : the affect was normal [de-identified] : hypopigmented patch left suprapubic area, extensive keloid

## 2023-03-03 PROCEDURE — 77014: CPT | Mod: 26

## 2023-03-06 ENCOUNTER — APPOINTMENT (OUTPATIENT)
Dept: OTHER | Facility: CLINIC | Age: 68
End: 2023-03-06
Payer: COMMERCIAL

## 2023-03-06 VITALS
WEIGHT: 272 LBS | SYSTOLIC BLOOD PRESSURE: 249 MMHG | TEMPERATURE: 98.5 F | HEIGHT: 78 IN | RESPIRATION RATE: 15 BRPM | BODY MASS INDEX: 31.47 KG/M2 | OXYGEN SATURATION: 98 % | DIASTOLIC BLOOD PRESSURE: 152 MMHG | HEART RATE: 82 BPM

## 2023-03-06 PROCEDURE — 77014: CPT | Mod: 26

## 2023-03-06 PROCEDURE — 77427 RADIATION TX MANAGEMENT X5: CPT

## 2023-03-06 PROCEDURE — 99214 OFFICE O/P EST MOD 30 MIN: CPT | Mod: 25

## 2023-03-06 PROCEDURE — 99397 PER PM REEVAL EST PAT 65+ YR: CPT | Mod: 25

## 2023-03-06 RX ORDER — PANTOPRAZOLE 40 MG/1
40 TABLET, DELAYED RELEASE ORAL
Qty: 90 | Refills: 3 | Status: COMPLETED | COMMUNITY
Start: 2022-07-28 | End: 2023-03-06

## 2023-03-06 RX ORDER — PANTOPRAZOLE 40 MG/1
40 TABLET, DELAYED RELEASE ORAL DAILY
Qty: 30 | Refills: 3 | Status: COMPLETED | COMMUNITY
Start: 2022-08-10 | End: 2023-03-06

## 2023-03-06 RX ORDER — SIMETHICONE 180 MG
180 CAPSULE ORAL 4 TIMES DAILY
Qty: 120 | Refills: 3 | Status: COMPLETED | COMMUNITY
Start: 2022-02-09 | End: 2023-03-06

## 2023-03-06 RX ORDER — SIMETHICONE 180 MG
180 CAPSULE ORAL 4 TIMES DAILY
Qty: 120 | Refills: 3 | Status: COMPLETED | COMMUNITY
Start: 2022-08-10 | End: 2023-03-06

## 2023-03-06 RX ORDER — PANTOPRAZOLE 40 MG/1
40 TABLET, DELAYED RELEASE ORAL
Qty: 90 | Refills: 0 | Status: COMPLETED | COMMUNITY
Start: 2022-02-09 | End: 2023-03-06

## 2023-03-07 LAB
ALBUMIN SERPL ELPH-MCNC: 4.5 G/DL
ALP BLD-CCNC: 68 U/L
ALT SERPL-CCNC: 22 U/L
ANION GAP SERPL CALC-SCNC: 15 MMOL/L
APPEARANCE: CLEAR
AST SERPL-CCNC: 27 U/L
BACTERIA: NEGATIVE
BASOPHILS # BLD AUTO: 0.05 K/UL
BASOPHILS NFR BLD AUTO: 0.9 %
BILIRUB SERPL-MCNC: 0.9 MG/DL
BILIRUBIN URINE: NEGATIVE
BLOOD URINE: NEGATIVE
BUN SERPL-MCNC: 16 MG/DL
CALCIUM SERPL-MCNC: 9.6 MG/DL
CHLORIDE SERPL-SCNC: 105 MMOL/L
CHOLEST SERPL-MCNC: 218 MG/DL
CO2 SERPL-SCNC: 23 MMOL/L
COLOR: NORMAL
CREAT SERPL-MCNC: 1.33 MG/DL
EGFR: 59 ML/MIN/1.73M2
EOSINOPHIL # BLD AUTO: 0.07 K/UL
EOSINOPHIL NFR BLD AUTO: 1.2 %
GLUCOSE QUALITATIVE U: NEGATIVE
GLUCOSE SERPL-MCNC: 91 MG/DL
HCT VFR BLD CALC: 51.7 %
HDLC SERPL-MCNC: 84 MG/DL
HGB BLD-MCNC: 16.6 G/DL
HYALINE CASTS: 0 /LPF
IMM GRANULOCYTES NFR BLD AUTO: 0.2 %
KETONES URINE: NEGATIVE
LDLC SERPL CALC-MCNC: 110 MG/DL
LEUKOCYTE ESTERASE URINE: NEGATIVE
LYMPHOCYTES # BLD AUTO: 0.76 K/UL
LYMPHOCYTES NFR BLD AUTO: 13.5 %
MAN DIFF?: NORMAL
MCHC RBC-ENTMCNC: 26.7 PG
MCHC RBC-ENTMCNC: 32.1 GM/DL
MCV RBC AUTO: 83.1 FL
MICROSCOPIC-UA: NORMAL
MONOCYTES # BLD AUTO: 0.71 K/UL
MONOCYTES NFR BLD AUTO: 12.6 %
NEUTROPHILS # BLD AUTO: 4.05 K/UL
NEUTROPHILS NFR BLD AUTO: 71.6 %
NITRITE URINE: NEGATIVE
NONHDLC SERPL-MCNC: 134 MG/DL
PH URINE: 6.5
PLATELET # BLD AUTO: 197 K/UL
POTASSIUM SERPL-SCNC: 4 MMOL/L
PROT SERPL-MCNC: 7.1 G/DL
PROTEIN URINE: ABNORMAL
RBC # BLD: 6.22 M/UL
RBC # FLD: 15.4 %
RED BLOOD CELLS URINE: 1 /HPF
SODIUM SERPL-SCNC: 142 MMOL/L
SPECIFIC GRAVITY URINE: 1.01
SQUAMOUS EPITHELIAL CELLS: 0 /HPF
TRIGL SERPL-MCNC: 122 MG/DL
UROBILINOGEN URINE: NORMAL
WBC # FLD AUTO: 5.65 K/UL
WHITE BLOOD CELLS URINE: 1 /HPF

## 2023-03-24 ENCOUNTER — NON-APPOINTMENT (OUTPATIENT)
Age: 68
End: 2023-03-24

## 2023-04-05 ENCOUNTER — APPOINTMENT (OUTPATIENT)
Dept: INTERNAL MEDICINE | Facility: CLINIC | Age: 68
End: 2023-04-05
Payer: COMMERCIAL

## 2023-04-05 VITALS
HEART RATE: 78 BPM | HEIGHT: 78 IN | BODY MASS INDEX: 31.7 KG/M2 | WEIGHT: 274 LBS | DIASTOLIC BLOOD PRESSURE: 140 MMHG | OXYGEN SATURATION: 98 % | SYSTOLIC BLOOD PRESSURE: 202 MMHG

## 2023-04-05 DIAGNOSIS — Z91.199 PATIENT'S NONCOMPLIANCE WITH OTHER MEDICAL TREATMENT AND REGIMEN DUE TO UNSPECIFIED REASON: ICD-10-CM

## 2023-04-05 DIAGNOSIS — G62.9 POLYNEUROPATHY, UNSPECIFIED: ICD-10-CM

## 2023-04-05 DIAGNOSIS — E78.00 PURE HYPERCHOLESTEROLEMIA, UNSPECIFIED: ICD-10-CM

## 2023-04-05 LAB — HBA1C MFR BLD HPLC: 5.8

## 2023-04-05 PROCEDURE — 99397 PER PM REEVAL EST PAT 65+ YR: CPT | Mod: 25

## 2023-04-05 PROCEDURE — 83036 HEMOGLOBIN GLYCOSYLATED A1C: CPT | Mod: QW

## 2023-04-05 RX ORDER — SILDENAFIL 50 MG/1
50 TABLET ORAL
Qty: 10 | Refills: 0 | Status: DISCONTINUED | COMMUNITY
Start: 2022-11-09 | End: 2023-04-05

## 2023-04-05 RX ORDER — SILDENAFIL 100 MG/1
100 TABLET, FILM COATED ORAL
Qty: 30 | Refills: 1 | Status: DISCONTINUED | COMMUNITY
Start: 2021-04-08 | End: 2023-04-05

## 2023-04-05 RX ORDER — IBUPROFEN 200 MG/1
200 TABLET ORAL
Qty: 90 | Refills: 0 | Status: DISCONTINUED | COMMUNITY
Start: 2021-10-27 | End: 2023-04-05

## 2023-04-13 ENCOUNTER — APPOINTMENT (OUTPATIENT)
Dept: CARDIOLOGY | Facility: CLINIC | Age: 68
End: 2023-04-13
Payer: COMMERCIAL

## 2023-04-13 ENCOUNTER — NON-APPOINTMENT (OUTPATIENT)
Age: 68
End: 2023-04-13

## 2023-04-13 VITALS
BODY MASS INDEX: 31.7 KG/M2 | HEIGHT: 78 IN | OXYGEN SATURATION: 99 % | DIASTOLIC BLOOD PRESSURE: 141 MMHG | WEIGHT: 274 LBS | SYSTOLIC BLOOD PRESSURE: 210 MMHG | HEART RATE: 72 BPM

## 2023-04-13 VITALS — DIASTOLIC BLOOD PRESSURE: 121 MMHG | SYSTOLIC BLOOD PRESSURE: 187 MMHG

## 2023-04-13 PROCEDURE — 99214 OFFICE O/P EST MOD 30 MIN: CPT | Mod: 25

## 2023-04-13 PROCEDURE — 93000 ELECTROCARDIOGRAM COMPLETE: CPT

## 2023-04-14 NOTE — DISCUSSION/SUMMARY
[FreeTextEntry1] : The patient is a 67-year-old gentleman GERD, prediabetes, HTN, HLD, Afib,prostate cancer s/p radiation whose BP is not under control.\par #1 Afib- c/w metoprolol, eliquis 5mg bid , compliant\par #2 Htn-  restart chlorthalidone,  amlodipine 10 mg, hold spironolactone 25mg, c/w minoxidil, c/w hydralazine, (not taking losartan either) renal sono normal,  f/u Dr. Pride as well, compliance with meds and f/u discussed.\par #3 LIpids- c/w atorvastatin\par  #4 Hyperglycemia- We discussed adherence to a low fat low cholesterol, ADA diet, weight loss and regular daily exercise. \par #5 GERD- on protonix \par  #6 - prostate cancer, s/p radiation therapy [EKG obtained to assist in diagnosis and management of assessed problem(s)] : EKG obtained to assist in diagnosis and management of assessed problem(s)

## 2023-04-14 NOTE — HISTORY OF PRESENT ILLNESS
[FreeTextEntry1] : Chad was late today. Brought his medications. States he ran out of several. Just got the hydralazine but no chlorthalidone or spironolactone. He was focused on radiation for prostate cancer. Now completed.

## 2023-04-15 PROBLEM — E78.00 HYPERCHOLESTEROLEMIA: Status: ACTIVE | Noted: 2017-04-11

## 2023-04-15 PROBLEM — Z91.199 NON-COMPLIANCE WITH TREATMENT: Status: ACTIVE | Noted: 2023-04-15

## 2023-04-15 NOTE — PHYSICAL EXAM
[No Acute Distress] : no acute distress [Well Nourished] : well nourished [Normal Sclera/Conjunctiva] : normal sclera/conjunctiva [PERRL] : pupils equal round and reactive to light [EOMI] : extraocular movements intact [Normal Outer Ear/Nose] : the outer ears and nose were normal in appearance [Normal Oropharynx] : the oropharynx was normal [No JVD] : no jugular venous distention [No Lymphadenopathy] : no lymphadenopathy [Supple] : supple [Thyroid Normal, No Nodules] : the thyroid was normal and there were no nodules present [No Respiratory Distress] : no respiratory distress  [No Accessory Muscle Use] : no accessory muscle use [Clear to Auscultation] : lungs were clear to auscultation bilaterally [Normal Rate] : normal rate  [Normal S1, S2] : normal S1 and S2 [No Carotid Bruits] : no carotid bruits [No Abdominal Bruit] : a ~M bruit was not heard ~T in the abdomen [Pedal Pulses Present] : the pedal pulses are present [Soft] : abdomen soft [Non Tender] : non-tender [Non-distended] : non-distended [No Masses] : no abdominal mass palpated [No HSM] : no HSM [Normal Bowel Sounds] : normal bowel sounds [No CVA Tenderness] : no CVA  tenderness [No Spinal Tenderness] : no spinal tenderness [No Joint Swelling] : no joint swelling [Grossly Normal Strength/Tone] : grossly normal strength/tone [No Rash] : no rash [Coordination Grossly Intact] : coordination grossly intact [No Focal Deficits] : no focal deficits [Normal Gait] : normal gait [Deep Tendon Reflexes (DTR)] : deep tendon reflexes were 2+ and symmetric [Normal Affect] : the affect was normal [Normal Insight/Judgement] : insight and judgment were intact [de-identified] : irregular heart beat known A-fib and medication non-complinace [de-identified] : Grade1+ b/l pitting edema at ankles. Pt did not take water pills.  [de-identified] : Left lower inguinal area healed with clean surgical scar.

## 2023-04-15 NOTE — HEALTH RISK ASSESSMENT
[Good] : ~his/her~  mood as  good [Yes] : Yes [Monthly or less (1 pt)] : Monthly or less (1 point) [1 or 2 (0 pts)] : 1 or 2 (0 points) [Less than monthly (1 pt)] : Less than monthly (1 point) [No] : In the past 12 months have you used drugs other than those required for medical reasons? No [No falls in past year] : Patient reported no falls in the past year [0] : 1) Little interest or pleasure doing things: Not at all (0) [1] : 2) Feeling down, depressed, or hopeless for several days (1) [PHQ-2 Negative - No further assessment needed] : PHQ-2 Negative - No further assessment needed [de-identified] : bear, starch, sake [Audit-CScore] : 2 [de-identified] : basketball, swimming, walking hiking, fishing [de-identified] : cut down salt, sweet [[id number]] : 1 [No Retinopathy] : No retinopathy [Patient reported colonoscopy was abnormal] : Patient reported colonoscopy was abnormal [Change in mental status noted] : No change in mental status noted [Language] : denies difficulty with language [Health Literacy] : health literacy [With Family] : lives with family [Employed] : employed [] :  [Sexually Active] : sexually active [High Risk Behavior] : no high risk behavior [Feels Safe at Home] : Feels safe at home [Fully functional (bathing, dressing, toileting, transferring, walking, feeding)] : Fully functional (bathing, dressing, toileting, transferring, walking, feeding) [Fully functional (using the telephone, shopping, preparing meals, housekeeping, doing laundry, using] : Fully functional and needs no help or supervision to perform IADLs (using the telephone, shopping, preparing meals, housekeeping, doing laundry, using transportation, managing medications and managing finances) [Reports changes in hearing] : Reports no changes in hearing [Reports changes in vision] : Reports no changes in vision [Reports changes in dental health] : Reports no changes in dental health [ColonoscopyDate] : 11/18 [ColonoscopyComments] : some polyps noted. to f/u with current GI Dr. 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 SN[facility], pt did not want hearing aids [de-identified] : seen by ophthalmologist long time ago [de-identified] : teeth broken recently. to see dentist [Patient/Caregiver not ready to engage] : , patient/caregiver not ready to engage [AdvancecareDate] : 10/21

## 2023-04-15 NOTE — HISTORY OF PRESENT ILLNESS
[FreeTextEntry1] : CPE [de-identified] : MARIA EUGENIA HERNANDEZ  is a 67 year old male  with history of DM, HTN, hyperlipidemia, prostate cancer and a-fib presented today for comprehensive evaluation.\par Is non-compliant with his medications.  He was last seen by internal medicine in 10/21.  Does follow with the [facility] program.\par \par Denies headache, dizziness.\par Denies rash, fatigue, fever, weight loss, anorexia.\par Denies cough, wheezing.\par Denies CP, SOB, WHITESIDE, edema, palpitations.\par Denies abdominal pain, N/V/D/C. Denies BRBPR, melena, dysphagia.\par Denies dysuria, frequency, hematuria, hesitancy.\par Denies weakness, numbness, gait instability.\par

## 2023-04-19 ENCOUNTER — APPOINTMENT (OUTPATIENT)
Dept: RADIATION ONCOLOGY | Facility: CLINIC | Age: 68
End: 2023-04-19
Payer: COMMERCIAL

## 2023-04-19 VITALS
RESPIRATION RATE: 16 BRPM | OXYGEN SATURATION: 99 % | HEART RATE: 82 BPM | DIASTOLIC BLOOD PRESSURE: 95 MMHG | SYSTOLIC BLOOD PRESSURE: 193 MMHG

## 2023-04-19 VITALS
HEIGHT: 78 IN | WEIGHT: 286 LBS | HEART RATE: 92 BPM | OXYGEN SATURATION: 97 % | BODY MASS INDEX: 33.09 KG/M2 | TEMPERATURE: 97.7 F | RESPIRATION RATE: 17 BRPM

## 2023-04-19 PROCEDURE — 99024 POSTOP FOLLOW-UP VISIT: CPT

## 2023-04-19 NOTE — REVIEW OF SYSTEMS
[Constipation: Grade 0] : Constipation: Grade 0 [Diarrhea: Grade 0] : Diarrhea: Grade 0 [Rectal Pain: Grade 0] : Rectal Pain: Grade 0 [Hematuria: Grade 0] : Hematuria: Grade 0 [Urinary Incontinence: Grade 0] : Urinary Incontinence: Grade 0  [Urinary Tract Pain: Grade 1 - Mild pain] : Urinary Tract Pain: Grade 1 - Mild pain [Urinary Urgency: Grade 2 - Limiting instrumental ADL; medical management indicated] : Urinary Urgency: Grade 2 - Limiting instrumental ADL; medical management indicated [Urinary Frequency: Grade 2 - Limiting instrumental ADL; medical management indicated] : Urinary Frequency: Grade 2 - Limiting instrumental ADL; medical management indicated [Fatigue: Grade 1 - Fatigue relieved by rest] : Fatigue: Grade 1 - Fatigue relieved by rest [Dermatitis Radiation: Grade 0] : Dermatitis Radiation: Grade 0 [FreeTextEntry4] : hypopigmentation tx site

## 2023-04-19 NOTE — PHYSICAL EXAM
[Normal] : well developed, well nourished, in no acute distress [Sclera] : the sclera and conjunctiva were normal [Extraocular Movements] : extraocular movements were intact [Outer Ear] : the ears and nose were normal in appearance [Hearing Threshold Finger Rub Not [place]] : hearing was normal [] : no respiratory distress [Respiration, Rhythm And Depth] : normal respiratory rhythm and effort [Exaggerated Use Of Accessory Muscles For Inspiration] : no accessory muscle use [Abdomen Soft] : soft [Nondistended] : nondistended [Nail Clubbing] : no clubbing  or cyanosis of the fingernails [Range of Motion to Joints] : range of motion to joints [No Focal Deficits] : no focal deficits [Motor Exam] : the motor exam was normal [Oriented To Time, Place, And Person] : oriented to person, place, and time [Affect] : the affect was normal [de-identified] : hypopigmented patch left suprapubic area, extensive keloid -no erythema

## 2023-04-19 NOTE — HISTORY OF PRESENT ILLNESS
[FreeTextEntry1] :  67-year-old male with prostate cancer, favorable intermediate risk, cT1c G7(3+4) iPSA 6.67 (11/9/22). Biopsy was 8/16/22, 6/13 sites involved up to G7(3+4). MRI /23/21 showed no RUBEN/SVI/LAD, 58 cc gland. Definitive hypofractionated radiation to the prostate and seminal vesicles recommended. The patient underwent a course of radiation therapy as outlined below. \par \par  Radiation Treatment Summary: Prostate/SV  7,000 cGy from 1/25/2023 to  3/06/2023  28 fx\par Clinical Course: Tolerated the treatment well and developed the expected side effects, including increased urinary urgency and frequency, dysuria and rectal irritation, as well as fatigue. Skin changes (hypopigmentation) noted on treatment that were highly unlikely to be due to radiation therapy\par \par 4/19/23: PTE\par Notes return of  symptoms to baseline. Nocturia 1x,  intermittent dysuria & post void dribbling.  Denies hematuria or bowel symptoms. Taking tamsulosin 1 tab at night. taking AZO intermittently.  Some erections suitable for intercourse with tadalafil. Hypopigmentation on treatment site present without erythema. He followed up with uro( Dr. Matson 3/2023). Of note, following PMD for HTN. Took BP meds late today.  IPSS/QOL/EPIC Score 3/1/13. \par \par \par \par

## 2023-05-06 NOTE — HISTORY OF PRESENT ILLNESS
[FreeTextEntry1] : s/p prostate biopsy \par doing well\par no voiding complaints \par no fever \par path atiya 3+4  4 = No assist / stand by assistance

## 2023-05-10 NOTE — PHYSICAL EXAM
[General Appearance - Alert] : alert [General Appearance - In No Acute Distress] : in no acute distress [Neck Appearance] : the appearance of the neck was normal [Respiration, Rhythm And Depth] : normal respiratory rhythm and effort [Exaggerated Use Of Accessory Muscles For Inspiration] : no accessory muscle use [Auscultation Breath Sounds / Voice Sounds] : lungs were clear to auscultation bilaterally [Apical Impulse] : the apical impulse was normal [Heart Rate And Rhythm] : heart rate was normal and rhythm regular [Heart Sounds] : normal S1 and S2 [Heart Sounds Gallop] : no gallops [Edema] : there was no peripheral edema [Bowel Sounds] : normal bowel sounds [Abdomen Soft] : soft [Abdomen Tenderness] : non-tender [Abnormal Walk] : normal gait [Involuntary Movements] : no involuntary movements were seen [] : no rash [No Focal Deficits] : no focal deficits [Oriented To Time, Place, And Person] : oriented to person, place, and time [Impaired Insight] : insight and judgment were intact [Affect] : the affect was normal [Mood] : the mood was normal

## 2023-05-11 ENCOUNTER — APPOINTMENT (OUTPATIENT)
Dept: NEPHROLOGY | Facility: CLINIC | Age: 68
End: 2023-05-11
Payer: COMMERCIAL

## 2023-05-11 VITALS
DIASTOLIC BLOOD PRESSURE: 82 MMHG | HEART RATE: 81 BPM | OXYGEN SATURATION: 98 % | SYSTOLIC BLOOD PRESSURE: 166 MMHG | TEMPERATURE: 98 F | BODY MASS INDEX: 31.47 KG/M2 | WEIGHT: 272 LBS | HEIGHT: 78 IN

## 2023-05-11 PROCEDURE — 99213 OFFICE O/P EST LOW 20 MIN: CPT

## 2023-05-11 RX ORDER — ALPRAZOLAM 0.25 MG/1
0.25 TABLET ORAL
Qty: 14 | Refills: 0 | Status: DISCONTINUED | COMMUNITY
End: 2023-04-19

## 2023-05-11 RX ORDER — TAMSULOSIN HYDROCHLORIDE 0.4 MG/1
0.4 CAPSULE ORAL
Qty: 90 | Refills: 0 | Status: DISCONTINUED | COMMUNITY
Start: 2023-02-02 | End: 2023-05-11

## 2023-05-11 RX ORDER — PHENAZOPYRIDINE HYDROCHLORIDE 200 MG/1
200 TABLET ORAL
Qty: 90 | Refills: 3 | Status: DISCONTINUED | COMMUNITY
Start: 2023-03-02 | End: 2023-05-11

## 2023-05-12 NOTE — HISTORY OF PRESENT ILLNESS
[FreeTextEntry1] : Mr. Dan is a 66 y/o male with long standing H/O HTN, pre diabetes , proteinuria,  uncontrolled HTN here for a follow up visit\par \par Per patient, he has a long standing H/O HTN for many years. For the most part, BP has been uncontrolled, although there was a period in between when his BP was well controlled on a certain regimen which he is unable to recall. He states that he takes his BP at home, and is pressure is usually high. \par He is unable to recall the medications that he is taking for his BP and states that the "medications are on his file"\par \par He also has a h/o Af ib and is supposed to be taking Eliquis which he stopped on his own for unclear reasons. \par \par He does admit to taking NSAIDS occasionally although most of the time he takes tylenol. \par  \par Diagnosed with prostate cancer recently and completed radiation therapy.\par Feels well overall. Brings in all his BP meds \par  \par  \par \par

## 2023-05-12 NOTE — ASSESSMENT
[FreeTextEntry1] : Mr. Dan is a 68 y/o male with h/o pre diabetes, uncontrolled HTN and proteinuria, recently diagnosed prostate ca s/p radiation therapy here for a follow up visit:  \par \par \par Resistant HTN: His BP much improved today\par He has been taking all his prescribed medications except for chlorthalidone, which he will start  soon . \par PRA/aldosterone level checked in the past was not elevated\par \par CKD/Proteinuria: This could be in the setting of uncontrolled HTN\par -BP has improved significantly today as he is taking all meds as prescribed. Restart chlorthalidone \par  Repeat Ur P/C ratio today   \par -F/U Genetic testing  \par -Limited   serological w/u to r/o secondary causes of proteinuria was  done and was negative  except for positive LYRIC . Would ideally like to stop hydralazine as it is associated with vasculitis/drug induce lupus etc. Will attempt this in the future\par -Reinforced to him that he take no NSAIDS\par -Low sodium diet \par \par f/u in 3 months\par \par

## 2023-05-25 ENCOUNTER — APPOINTMENT (OUTPATIENT)
Dept: CARDIOLOGY | Facility: CLINIC | Age: 68
End: 2023-05-25
Payer: COMMERCIAL

## 2023-05-25 VITALS
WEIGHT: 272 LBS | HEART RATE: 7 BPM | SYSTOLIC BLOOD PRESSURE: 168 MMHG | HEIGHT: 78 IN | OXYGEN SATURATION: 99 % | BODY MASS INDEX: 31.47 KG/M2 | DIASTOLIC BLOOD PRESSURE: 93 MMHG

## 2023-05-25 LAB
CREAT SPEC-SCNC: 33 MG/DL
CREAT/PROT UR: 1 RATIO
PROT UR-MCNC: 34 MG/DL

## 2023-05-25 PROCEDURE — 99213 OFFICE O/P EST LOW 20 MIN: CPT | Mod: 25

## 2023-05-25 PROCEDURE — 93000 ELECTROCARDIOGRAM COMPLETE: CPT

## 2023-05-26 NOTE — DISCUSSION/SUMMARY
[FreeTextEntry1] : The patient is a 67-year-old gentleman GERD, prediabetes, HTN, HLD, Afib,prostate cancer s/p radiation whose BP is not under control.\par #1 Afib- c/w metoprolol, eliquis 5mg bid , compliant\par #2 Htn-  restart chlorthalidone,  amlodipine 10 mg, take spironolactone 25mg, c/w minoxidil, c/w hydralazine, (not taking losartan either) renal sono normal,  f/u Dr. Pride as well, compliance with meds and f/u discussed.\par #3 LIpids- c/w atorvastatin\par  #4 Hyperglycemia- We discussed adherence to a low fat low cholesterol, ADA diet, weight loss and regular daily exercise. \par #5 GERD- on protonix \par  #6 - prostate cancer, s/p radiation therapy [EKG obtained to assist in diagnosis and management of assessed problem(s)] : EKG obtained to assist in diagnosis and management of assessed problem(s)

## 2023-05-26 NOTE — HISTORY OF PRESENT ILLNESS
[FreeTextEntry1] : Chad did not  the chlorthalidone. Here with medications but always has issue with not taking all of the prescriptions. Asking about  medications as well. Worked all night and tired.

## 2023-06-02 ENCOUNTER — APPOINTMENT (OUTPATIENT)
Dept: UROLOGY | Facility: CLINIC | Age: 68
End: 2023-06-02
Payer: COMMERCIAL

## 2023-06-02 VITALS
HEIGHT: 78 IN | DIASTOLIC BLOOD PRESSURE: 82 MMHG | BODY MASS INDEX: 31.24 KG/M2 | TEMPERATURE: 97.7 F | WEIGHT: 270 LBS | SYSTOLIC BLOOD PRESSURE: 143 MMHG

## 2023-06-02 PROCEDURE — 99214 OFFICE O/P EST MOD 30 MIN: CPT
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
## 2023-06-02 NOTE — ASSESSMENT
[FreeTextEntry1] : Mr. Dan is a very pleasant 67 year old man here today for history of prostate cancer, BPH, erectile dysfunction.\par \par Continue tamsulosin - refills sent to pharmacy.\par Continue tadalafil - refills sent to pharmacy.\par PSA.\par RTO 6 months.

## 2023-06-05 LAB — PSA SERPL-MCNC: 2.56 NG/ML

## 2023-06-07 ENCOUNTER — NON-APPOINTMENT (OUTPATIENT)
Age: 68
End: 2023-06-07

## 2023-06-26 ENCOUNTER — TRANSCRIPTION ENCOUNTER (OUTPATIENT)
Age: 68
End: 2023-06-26

## 2023-07-13 ENCOUNTER — NON-APPOINTMENT (OUTPATIENT)
Age: 68
End: 2023-07-13

## 2023-07-13 ENCOUNTER — APPOINTMENT (OUTPATIENT)
Dept: CARDIOLOGY | Facility: CLINIC | Age: 68
End: 2023-07-13
Payer: COMMERCIAL

## 2023-07-13 VITALS
HEIGHT: 78 IN | HEART RATE: 86 BPM | DIASTOLIC BLOOD PRESSURE: 90 MMHG | BODY MASS INDEX: 31.24 KG/M2 | SYSTOLIC BLOOD PRESSURE: 166 MMHG | WEIGHT: 270 LBS | OXYGEN SATURATION: 99 %

## 2023-07-13 PROCEDURE — 93000 ELECTROCARDIOGRAM COMPLETE: CPT

## 2023-07-13 PROCEDURE — 99213 OFFICE O/P EST LOW 20 MIN: CPT | Mod: 25
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
## 2023-07-14 NOTE — DISCUSSION/SUMMARY
[FreeTextEntry1] : The patient is a 67-year-old gentleman GERD, prediabetes, HTN, HLD, Afib,prostate cancer s/p radiation with reactive hypertension today.\par #1 Afib- c/w metoprolol, eliquis 5mg bid , compliant\par #2 HTN-  c/w chlorthalidone,  amlodipine 10 mg, spironolactone 25mg, minoxidil, 2.5mg c/w hydralazine, (not taking losartan either) renal sono normal,  f/u Dr. Pride as well, compliance with meds and f/u discussed.\par #3 HLD- c/w atorvastatin\par  #4 Hyperglycemia- We discussed adherence to a low fat low cholesterol, ADA diet, weight loss and regular daily exercise. \par #5 GERD- on protonix \par  #6 - prostate cancer, s/p radiation therapy [EKG obtained to assist in diagnosis and management of assessed problem(s)] : EKG obtained to assist in diagnosis and management of assessed problem(s)

## 2023-08-02 ENCOUNTER — LABORATORY RESULT (OUTPATIENT)
Age: 68
End: 2023-08-02

## 2023-08-02 ENCOUNTER — TRANSCRIPTION ENCOUNTER (OUTPATIENT)
Age: 68
End: 2023-08-02

## 2023-08-02 ENCOUNTER — APPOINTMENT (OUTPATIENT)
Dept: RADIATION ONCOLOGY | Facility: CLINIC | Age: 68
End: 2023-08-02
Payer: COMMERCIAL

## 2023-08-02 VITALS
HEART RATE: 61 BPM | DIASTOLIC BLOOD PRESSURE: 126 MMHG | HEIGHT: 78 IN | WEIGHT: 274.26 LBS | BODY MASS INDEX: 31.73 KG/M2 | TEMPERATURE: 97.88 F | SYSTOLIC BLOOD PRESSURE: 228 MMHG | OXYGEN SATURATION: 99 %

## 2023-08-02 VITALS — HEART RATE: 65 BPM | SYSTOLIC BLOOD PRESSURE: 246 MMHG | OXYGEN SATURATION: 96 % | DIASTOLIC BLOOD PRESSURE: 133 MMHG

## 2023-08-02 DIAGNOSIS — R82.90 UNSPECIFIED ABNORMAL FINDINGS IN URINE: ICD-10-CM

## 2023-08-02 LAB — PSA SERPL-MCNC: 1.8 NG/ML

## 2023-08-02 PROCEDURE — 99214 OFFICE O/P EST MOD 30 MIN: CPT

## 2023-08-02 NOTE — HISTORY OF PRESENT ILLNESS
[FreeTextEntry1] :  67-year-old male with prostate cancer, favorable intermediate risk, cT1c G7(3+4) iPSA 6.67 (11/9/22). Biopsy was 8/16/22, 6/13 sites involved up to G7(3+4). MRI /23/21 showed no RUBEN/SVI/LAD, 58 cc gland. Definitive hypofractionated radiation to the prostate and seminal vesicles recommended. The patient underwent a course of radiation therapy as outlined below.   Radiation Treatment Summary:  Prostate/SV  7,000 cGy in 28 fx from 1/25/2023 - 3/06/2023  Clinical Course: Tolerated the treatment well and developed the expected side effects, including increased urinary urgency and frequency, dysuria and rectal irritation, as well as fatigue. Skin changes (hypopigmentation) noted on treatment that were highly unlikely to be due to radiation therapy  4/19/23: PTE Notes return of  symptoms to baseline. Nocturia 1x, intermittent dysuria & post void dribbling.  Denies hematuria or bowel symptoms. Taking tamsulosin 1 tab at night. taking AZO intermittently.  Some erections suitable for intercourse with tadalafil. Hypopigmentation on treatment site present without erythema. He followed up with uro (Dr. Matson 3/2023). Of note, following PMD for HTN. Took BP meds late today.   IPSS/QOL/EPIC Score 3/1/13.   PSA trend:  iPSA 6.67 -> RT complete 3/06/2023  6/2/23 2.56 8/1/23 1.8   8/2/23: urgent followup for discolored urine Pt came by from work, noted that he had dark reddish urine that he had collected in a cup. Has cleared today. Some mild burning on urination. No bowel issues. Was prescribed tadalafil by his urologist, hasn't picked up new prescription yet. Ate a number of beet pills and beet gummies today. BP elevated in 220's; forgot to take meds 246/133 on recheck. No headache or chest pain.  IPSS/QOL/EPIC Score 2/1/12

## 2023-08-02 NOTE — REVIEW OF SYSTEMS
[Constipation: Grade 0] : Constipation: Grade 0 [Diarrhea: Grade 0] : Diarrhea: Grade 0 [Rectal Pain: Grade 0] : Rectal Pain: Grade 0 [Hematuria: Grade 0] : Hematuria: Grade 0 [Urinary Incontinence: Grade 0] : Urinary Incontinence: Grade 0  [Urinary Tract Pain: Grade 1 - Mild pain] : Urinary Tract Pain: Grade 1 - Mild pain [Dermatitis Radiation: Grade 0] : Dermatitis Radiation: Grade 0 [Urinary Urgency: Grade 0] : Urinary Urgency: Grade 0 [Urinary Frequency: Grade 0] : Urinary Frequency: Grade 0 [Erectile Dysfunction: Grade 2 - Decrease in erectile function (frequency/rigidity of erections), erectile intervention indicated, (e.g., medication or mechanical devices such as penile pump)] : Erectile Dysfunction: Grade 2 - Decrease in erectile function (frequency/rigidity of erections), erectile intervention indicated, (e.g., medication or mechanical devices such as penile pump) [Fatigue: Grade 0] : Fatigue: Grade 0 [FreeTextEntry4] : hypopigmentation tx site

## 2023-08-02 NOTE — DISEASE MANAGEMENT
[IIB] : IIB [Treatment with radiation therapy] : Treatment with radiation therapy [EBRT] : EBRT [Clinical] : TNM Stage: c [LDR] : No LDR [HDR] : No HDR [RadiationCompletedDate] : 3/6/23 [EBRTDose] : 5000 [EBRTFractions] : 28 [TTNM] : 1c [NTNM] : 0 [MTNM] : 0

## 2023-08-02 NOTE — PHYSICAL EXAM
[Normal] : well developed, well nourished, in no acute distress [Sclera] : the sclera and conjunctiva were normal [Outer Ear] : the ears and nose were normal in appearance [Extraocular Movements] : extraocular movements were intact [Hearing Threshold Finger Rub Not [place]] : hearing was normal [] : no respiratory distress [Respiration, Rhythm And Depth] : normal respiratory rhythm and effort [Exaggerated Use Of Accessory Muscles For Inspiration] : no accessory muscle use [Abdomen Soft] : soft [Nondistended] : nondistended [Nail Clubbing] : no clubbing  or cyanosis of the fingernails [Range of Motion to Joints] : range of motion to joints [No Focal Deficits] : no focal deficits [Motor Exam] : the motor exam was normal [Oriented To Time, Place, And Person] : oriented to person, place, and time [Affect] : the affect was normal [Arterial Pulses Normal] : the arterial pulses were normal [Edema] : no peripheral edema present [Skin Color & Pigmentation] : normal skin color and pigmentation

## 2023-08-03 ENCOUNTER — NON-APPOINTMENT (OUTPATIENT)
Age: 68
End: 2023-08-03

## 2023-08-03 LAB
APPEARANCE: CLEAR
BILIRUBIN URINE: NEGATIVE
BLOOD URINE: ABNORMAL
COLOR: YELLOW
GLUCOSE QUALITATIVE U: NEGATIVE MG/DL
KETONES URINE: NEGATIVE MG/DL
LEUKOCYTE ESTERASE URINE: NEGATIVE
NITRITE URINE: NEGATIVE
PH URINE: 5.5
PROTEIN URINE: 300 MG/DL
SPECIFIC GRAVITY URINE: 1.02
UROBILINOGEN URINE: 0.2 MG/DL

## 2023-08-07 ENCOUNTER — APPOINTMENT (OUTPATIENT)
Dept: GASTROENTEROLOGY | Facility: CLINIC | Age: 68
End: 2023-08-07
Payer: COMMERCIAL

## 2023-08-07 VITALS
HEIGHT: 78 IN | TEMPERATURE: 97.9 F | DIASTOLIC BLOOD PRESSURE: 109 MMHG | HEART RATE: 78 BPM | BODY MASS INDEX: 31.7 KG/M2 | SYSTOLIC BLOOD PRESSURE: 198 MMHG | WEIGHT: 274 LBS | OXYGEN SATURATION: 98 %

## 2023-08-07 DIAGNOSIS — K29.30 CHRONIC SUPERFICIAL GASTRITIS W/OUT BLEEDING: ICD-10-CM

## 2023-08-07 DIAGNOSIS — K21.9 GASTRO-ESOPHAGEAL REFLUX DISEASE W/OUT ESOPHAGITIS: ICD-10-CM

## 2023-08-07 DIAGNOSIS — R10.13 EPIGASTRIC PAIN: ICD-10-CM

## 2023-08-07 PROCEDURE — 99213 OFFICE O/P EST LOW 20 MIN: CPT

## 2023-08-07 RX ORDER — PANTOPRAZOLE 40 MG/1
40 TABLET, DELAYED RELEASE ORAL
Qty: 30 | Refills: 3 | Status: ACTIVE | COMMUNITY
Start: 2023-08-07 | End: 1900-01-01

## 2023-08-07 RX ORDER — SIMETHICONE 125 MG/1
125 TABLET, CHEWABLE ORAL
Qty: 120 | Refills: 3 | Status: ACTIVE | COMMUNITY
Start: 2023-08-07 | End: 1900-01-01

## 2023-08-07 NOTE — ASSESSMENT
[FreeTextEntry1] : Dyspepsia: The patient complains of dyspeptic symptoms.  The patient was advised to continue to abide by an anti-gas (low FOD-MAP) diet.  The patient was previously given a pamphlet for anti-gas (low FOD-MAP).  The patient and I reviewed the anti-gas (low FOD-MAP)diet at length again. The patient is to continue on a trial of Simethicone one tablet 4 times a day p.r.n. abdominal pain and gas. GERD: The patient was advised to avoid late-night meals and dietary indiscretions.  The patient was advised to avoid fried and fatty foods.  The patient was advised to abide by an anti-GERD diet. The patient was given a pamphlet for anti-GERD.  The patient and I reviewed the anti-GERD diet at length. I recommend a trial of Pantoprazole 40 mg once a day x 3 months for the symptoms. Gastritis: The patient has a history of gastritis. The patient is to avoid nonsteroidal anti-inflammatory drugs and aspirin. I recommend continue on a trial of pantoprazole 40 mg once a day. Intestinal Metaplasia of the Stomach: The patient was previously found to have intestinal metaplasia of the stomach on prior upper endoscopy. The findings of intestinal metaplasia of the stomach have an increased risk of gastric cancer. Prior biopsies did not reveal dysplasia. I recommend a repeat upper endoscopy with mapping in 2 years to reassess for intestinal metaplasia and dysplasia unless symptomatic. The patient is aware of the increased risk of intestinal metaplasia and progression to stomach cancer. The patient agrees to follow-up. H. pylori Gastritis: The patient was previously found to have H. pylori gastritis on prior upper endoscopy. The patient completed a trial of Clarithromycin 500 mg 2 times a day, Amoxicillin 500mg 2 tablets twice a day and Omeprazole 40 mg twice a day for 14 days for H. pylori eradication. The H. pylori breath test performed on February 9, 2021 was not detected. Prostate Cancer: I recommend continue followup with urologist, Dr. Mal Matson to assess and treat the prostate cancer. The patient is s/p radiation therapy x 31 days. Prior Endoscopic Procedures: The patient had a prior colonoscopy performed by another gastroenterologist.  I will try to obtain the prior colonoscopy reports.  The patient is to sign a record release to obtain those records. Follow-up: The patient is to follow-up in the office in 6 months to reassess the symptoms. The patient was told to call the office if any further problems.

## 2023-08-07 NOTE — PHYSICAL EXAM
[Healthy Appearing] : healthy appearing [Well Developed] : well developed [Hearing Threshold Finger Rub Not Edwards] : hearing was normal [Normal Appearance] : the appearance of the neck was normal [Auscultation Breath Sounds / Voice Sounds] : lungs were clear to auscultation bilaterally [Respiration, Rhythm And Depth] : normal respiratory rhythm and effort [Heart Rate And Rhythm] : heart rate was normal and rhythm regular [Normal S1, S2] : normal S1 and S2 [Murmurs] : no murmurs [Heart Sounds Gallop] : no gallops [Bowel Sounds] : normal bowel sounds [Abdomen Tenderness] : non-tender [No Masses] : no abdominal mass palpated [No CVA Tenderness] : no CVA  tenderness [Abnormal Walk] : normal gait [Motor Tone] : muscle strength and tone were normal [Normal Color / Pigmentation] : normal skin color and pigmentation [] : no rash [Sensation] : the sensory exam was normal to light touch and pinprick [No Focal Deficits] : no focal deficits [Motor Exam] : the motor exam was normal [Normal] : oriented to person, place, and time [de-identified] : overweight [de-identified] : FROM in all extremities [de-identified] : not done
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
## 2023-08-08 ENCOUNTER — APPOINTMENT (OUTPATIENT)
Dept: NEPHROLOGY | Facility: CLINIC | Age: 68
End: 2023-08-08

## 2023-09-14 ENCOUNTER — APPOINTMENT (OUTPATIENT)
Dept: NEPHROLOGY | Facility: CLINIC | Age: 68
End: 2023-09-14

## 2023-09-19 ENCOUNTER — APPOINTMENT (OUTPATIENT)
Dept: CARDIOLOGY | Facility: HOSPITAL | Age: 68
End: 2023-09-19
Payer: COMMERCIAL

## 2023-09-19 ENCOUNTER — NON-APPOINTMENT (OUTPATIENT)
Age: 68
End: 2023-09-19

## 2023-09-19 VITALS
SYSTOLIC BLOOD PRESSURE: 202 MMHG | HEIGHT: 78 IN | BODY MASS INDEX: 30.78 KG/M2 | DIASTOLIC BLOOD PRESSURE: 79 MMHG | WEIGHT: 266 LBS

## 2023-09-19 PROCEDURE — 93000 ELECTROCARDIOGRAM COMPLETE: CPT

## 2023-09-19 PROCEDURE — 99213 OFFICE O/P EST LOW 20 MIN: CPT | Mod: 25

## 2023-09-19 RX ORDER — APIXABAN 5 MG/1
5 TABLET, FILM COATED ORAL
Qty: 180 | Refills: 1 | Status: ACTIVE | COMMUNITY
Start: 2019-03-05 | End: 1900-01-01

## 2023-09-20 ENCOUNTER — APPOINTMENT (OUTPATIENT)
Dept: NEPHROLOGY | Facility: CLINIC | Age: 68
End: 2023-09-20
Payer: COMMERCIAL

## 2023-09-20 VITALS
DIASTOLIC BLOOD PRESSURE: 150 MMHG | BODY MASS INDEX: 30.78 KG/M2 | HEART RATE: 77 BPM | OXYGEN SATURATION: 98 % | HEIGHT: 78 IN | SYSTOLIC BLOOD PRESSURE: 234 MMHG | TEMPERATURE: 98.5 F | WEIGHT: 266 LBS

## 2023-09-20 PROCEDURE — 99214 OFFICE O/P EST MOD 30 MIN: CPT | Mod: GC

## 2023-09-20 RX ORDER — CHLORTHALIDONE 25 MG/1
25 TABLET ORAL DAILY
Qty: 90 | Refills: 1 | Status: DISCONTINUED | COMMUNITY
Start: 2017-04-28 | End: 2023-09-20

## 2023-09-20 RX ORDER — SPIRONOLACTONE 25 MG/1
25 TABLET ORAL DAILY
Qty: 90 | Refills: 1 | Status: DISCONTINUED | COMMUNITY
Start: 2022-05-24 | End: 2023-09-20

## 2023-09-26 LAB
ALBUMIN SERPL ELPH-MCNC: 4.4 G/DL
ANION GAP SERPL CALC-SCNC: 13 MMOL/L
BUN SERPL-MCNC: 15 MG/DL
CALCIUM SERPL-MCNC: 9.9 MG/DL
CHLORIDE SERPL-SCNC: 103 MMOL/L
CO2 SERPL-SCNC: 26 MMOL/L
CREAT SERPL-MCNC: 1.44 MG/DL
EGFR: 54 ML/MIN/1.73M2
GLUCOSE SERPL-MCNC: 79 MG/DL
PHOSPHATE SERPL-MCNC: 2.7 MG/DL
POTASSIUM SERPL-SCNC: 3.9 MMOL/L
SODIUM SERPL-SCNC: 142 MMOL/L

## 2023-11-27 ENCOUNTER — RX RENEWAL (OUTPATIENT)
Age: 68
End: 2023-11-27

## 2023-11-30 ENCOUNTER — APPOINTMENT (OUTPATIENT)
Dept: NEPHROLOGY | Facility: CLINIC | Age: 68
End: 2023-11-30
Payer: COMMERCIAL

## 2023-11-30 VITALS
HEIGHT: 78 IN | BODY MASS INDEX: 30.08 KG/M2 | WEIGHT: 260 LBS | SYSTOLIC BLOOD PRESSURE: 248 MMHG | HEART RATE: 106 BPM | TEMPERATURE: 97.7 F | OXYGEN SATURATION: 99 % | DIASTOLIC BLOOD PRESSURE: 106 MMHG

## 2023-11-30 DIAGNOSIS — N28.1 CYST OF KIDNEY, ACQUIRED: ICD-10-CM

## 2023-11-30 DIAGNOSIS — I10 ESSENTIAL (PRIMARY) HYPERTENSION: ICD-10-CM

## 2023-11-30 DIAGNOSIS — N18.30 CHRONIC KIDNEY DISEASE, STAGE 3 UNSPECIFIED: ICD-10-CM

## 2023-11-30 DIAGNOSIS — R80.9 PROTEINURIA, UNSPECIFIED: ICD-10-CM

## 2023-11-30 LAB
25(OH)D3 SERPL-MCNC: 38.8 NG/ML
ALBUMIN SERPL ELPH-MCNC: 4.2 G/DL
ANION GAP SERPL CALC-SCNC: 13 MMOL/L
BUN SERPL-MCNC: 17 MG/DL
CALCIUM SERPL-MCNC: 9.5 MG/DL
CALCIUM SERPL-MCNC: 9.5 MG/DL
CHLORIDE SERPL-SCNC: 106 MMOL/L
CO2 SERPL-SCNC: 24 MMOL/L
CREAT SERPL-MCNC: 1.48 MG/DL
EGFR: 52 ML/MIN/1.73M2
FERRITIN SERPL-MCNC: 91 NG/ML
GLUCOSE SERPL-MCNC: 95 MG/DL
HCT VFR BLD CALC: 48.9 %
HGB BLD-MCNC: 16 G/DL
IRON SATN MFR SERPL: 17 %
IRON SERPL-MCNC: 53 UG/DL
MAGNESIUM SERPL-MCNC: 2.1 MG/DL
MCHC RBC-ENTMCNC: 27.4 PG
MCHC RBC-ENTMCNC: 32.7 GM/DL
MCV RBC AUTO: 83.7 FL
PARATHYROID HORMONE INTACT: 119 PG/ML
PHOSPHATE SERPL-MCNC: 3.1 MG/DL
PLATELET # BLD AUTO: 204 K/UL
POTASSIUM SERPL-SCNC: 4 MMOL/L
RBC # BLD: 5.84 M/UL
RBC # FLD: 15.1 %
SODIUM SERPL-SCNC: 143 MMOL/L
TIBC SERPL-MCNC: 314 UG/DL
UIBC SERPL-MCNC: 261 UG/DL
WBC # FLD AUTO: 5.13 K/UL

## 2023-11-30 PROCEDURE — 99214 OFFICE O/P EST MOD 30 MIN: CPT

## 2023-12-01 LAB — ALDOSTERONE SERUM: 14.8 NG/DL

## 2023-12-01 RX ORDER — NEBIVOLOL 10 MG/1
10 TABLET ORAL
Qty: 90 | Refills: 3 | Status: ACTIVE | COMMUNITY
Start: 2023-12-01 | End: 1900-01-01

## 2023-12-01 RX ORDER — SPIRONOLACTONE AND HYDROCHLOROTHIAZIDE 25; 25 MG/1; MG/1
25-25 TABLET, FILM COATED ORAL
Qty: 60 | Refills: 3 | Status: DISCONTINUED | COMMUNITY
Start: 2023-09-20 | End: 2023-12-01

## 2023-12-01 RX ORDER — MINOXIDIL 2.5 MG/1
2.5 TABLET ORAL DAILY
Qty: 60 | Refills: 3 | Status: DISCONTINUED | COMMUNITY
Start: 2022-06-07 | End: 2023-12-01

## 2023-12-01 RX ORDER — LOSARTAN POTASSIUM 100 MG/1
100 TABLET, FILM COATED ORAL DAILY
Qty: 1 | Refills: 3 | Status: DISCONTINUED | COMMUNITY
Start: 2022-07-12 | End: 2023-12-01

## 2023-12-01 RX ORDER — AMLODIPINE BESYLATE 10 MG/1
10 TABLET ORAL DAILY
Qty: 90 | Refills: 3 | Status: ACTIVE | COMMUNITY
Start: 2020-12-18 | End: 1900-01-01

## 2023-12-03 ENCOUNTER — RX RENEWAL (OUTPATIENT)
Age: 68
End: 2023-12-03

## 2023-12-03 RX ORDER — CHLORTHALIDONE 25 MG/1
25 TABLET ORAL
Qty: 90 | Refills: 0 | Status: ACTIVE | COMMUNITY
Start: 2023-12-01 | End: 1900-01-01

## 2023-12-08 LAB — RENIN ACTIVITY, PLASMA: 0.21 NG/ML/HR

## 2023-12-13 NOTE — PHYSICAL EXAM
[Well Developed] : well developed [Well Nourished] : well nourished [No Acute Distress] : no acute distress [Normal Conjunctiva] : normal conjunctiva [Normal Venous Pressure] : normal venous pressure [No Carotid Bruit] : no carotid bruit [Normal S1, S2] : normal S1, S2 [No Murmur] : no murmur [No Rub] : no rub [No Gallop] : no gallop [Clear Lung Fields] : clear lung fields [Good Air Entry] : good air entry [No Respiratory Distress] : no respiratory distress  [Soft] : abdomen soft [Non Tender] : non-tender [No Masses/organomegaly] : no masses/organomegaly [Normal Bowel Sounds] : normal bowel sounds [Normal Gait] : normal gait [No Edema] : no edema [No Cyanosis] : no cyanosis [No Clubbing] : no clubbing [No Varicosities] : no varicosities [No Rash] : no rash [No Skin Lesions] : no skin lesions [No Focal Deficits] : no focal deficits [Moves all extremities] : moves all extremities [Normal Speech] : normal speech [Normal memory] : normal memory [Alert and Oriented] : alert and oriented

## 2023-12-14 ENCOUNTER — NON-APPOINTMENT (OUTPATIENT)
Age: 68
End: 2023-12-14

## 2023-12-14 ENCOUNTER — APPOINTMENT (OUTPATIENT)
Dept: CARDIOLOGY | Facility: CLINIC | Age: 68
End: 2023-12-14
Payer: COMMERCIAL

## 2023-12-14 VITALS
WEIGHT: 260 LBS | OXYGEN SATURATION: 100 % | DIASTOLIC BLOOD PRESSURE: 112 MMHG | HEART RATE: 102 BPM | SYSTOLIC BLOOD PRESSURE: 165 MMHG | BODY MASS INDEX: 30.05 KG/M2

## 2023-12-14 DIAGNOSIS — I10 ESSENTIAL (PRIMARY) HYPERTENSION: ICD-10-CM

## 2023-12-14 PROCEDURE — 99213 OFFICE O/P EST LOW 20 MIN: CPT | Mod: 25

## 2023-12-14 PROCEDURE — 93000 ELECTROCARDIOGRAM COMPLETE: CPT

## 2023-12-15 NOTE — DISCUSSION/SUMMARY
[FreeTextEntry1] : The patient is a 68-year-old gentleman GERD, prediabetes, HTN, HLD, Afib,prostate cancer s/p radiation with HTN secondary to noncompliance. #1 Afib- c/w metoprolol, eliquis 5mg bid , compliant #2 HTN-  c/w chlorthalidone,  amlodipine 10 mg, spironolactone 50mg bid, hydralazine and bystolic, renal sono normal,  f/u Dr. Pride as well, compliance with meds and f/u discussed. #3 HLD- c/w atorvastatin #4 Hyperglycemia- We discussed adherence to a low fat low cholesterol, ADA diet, weight loss and regular daily exercise.  #5 GERD- on protonix  #6 - prostate cancer, s/p radiation therapy [EKG obtained to assist in diagnosis and management of assessed problem(s)] : EKG obtained to assist in diagnosis and management of assessed problem(s)

## 2023-12-15 NOTE — HISTORY OF PRESENT ILLNESS
[FreeTextEntry1] : Chad has been more compliant with f/u but here today without taking medications and cannot accurately discuss what he is taking.

## 2024-01-17 ENCOUNTER — APPOINTMENT (OUTPATIENT)
Dept: UROLOGY | Facility: CLINIC | Age: 69
End: 2024-01-17
Payer: COMMERCIAL

## 2024-01-17 VITALS
HEIGHT: 78 IN | SYSTOLIC BLOOD PRESSURE: 209 MMHG | DIASTOLIC BLOOD PRESSURE: 114 MMHG | WEIGHT: 260 LBS | TEMPERATURE: 96.8 F | OXYGEN SATURATION: 98 % | BODY MASS INDEX: 30.08 KG/M2 | HEART RATE: 99 BPM

## 2024-01-17 PROCEDURE — G2211 COMPLEX E/M VISIT ADD ON: CPT

## 2024-01-17 PROCEDURE — 99214 OFFICE O/P EST MOD 30 MIN: CPT

## 2024-01-17 NOTE — HISTORY OF PRESENT ILLNESS
[Currently Experiencing ___] :  [unfilled] [Dysuria] : dysuria [Hematuria - Gross] : gross hematuria [None] : None [FreeTextEntry1] : Mr. Dan is a very pleasant 68 year old man here today for history of prostate cancer, hematospermia, gross hematuria. He reports 3 episodes of gross hematuria over the last few months, nothing recent. He is unsure if it was throughout the stream, at the beginning or the end. He reports occasional dysuria but a good urinary stream. He believes he hasn't refilled his tamsulosin lately. Reports new onset hematospermia this past month. Reports a small amount of blood comes out, he achieves orgasm and then the semen is red to brown tinged. He denies any pain with ejaculation. Has been taking Azo.

## 2024-01-17 NOTE — ASSESSMENT
[FreeTextEntry1] : Mr. Dan is a very pleasant 68 year old man here today for history of prostate cancer, hematospermia, gross hematuria. S/p prostate cancer treated with radiation. He reports 3 episodes of gross hematuria over the last few months, nothing recent. He is unsure if it was throughout the stream, at the beginning or the end. He reports occasional dysuria but a good urinary stream. He believes he hasn't refilled his tamsulosin lately. Reports new onset hematospermia this past month. Reports a small amount of blood comes out, he achieves orgasm and then the semen is red to brown tinged. He denies any pain with ejaculation. Has been taking Azo. PSA 08/2023 1.80. Cr 1.48. PSA. Repeat BMP for creatinine. UC. UA. Cytology. CT Urogram - may change imaging order based on Cr from BMP. RTO cystoscopy. We discussed potential etiologies of hematospermia and hematuria  Patient is being seen today for evaluation and management of a chronic and longitudinal ongoing condition and I am of the primary treating physician

## 2024-01-18 LAB
ANION GAP SERPL CALC-SCNC: 13 MMOL/L
APPEARANCE: CLEAR
BACTERIA: NEGATIVE /HPF
BILIRUBIN URINE: NEGATIVE
BLOOD URINE: NEGATIVE
BUN SERPL-MCNC: 19 MG/DL
CALCIUM SERPL-MCNC: 9.6 MG/DL
CAST: 1 /LPF
CHLORIDE SERPL-SCNC: 102 MMOL/L
CO2 SERPL-SCNC: 26 MMOL/L
COLOR: NORMAL
CREAT SERPL-MCNC: 1.6 MG/DL
EGFR: 47 ML/MIN/1.73M2
EPITHELIAL CELLS: 1 /HPF
GLUCOSE QUALITATIVE U: NEGATIVE MG/DL
GLUCOSE SERPL-MCNC: 115 MG/DL
KETONES URINE: NEGATIVE MG/DL
LEUKOCYTE ESTERASE URINE: NEGATIVE
MICROSCOPIC-UA: NORMAL
NITRITE URINE: NEGATIVE
PH URINE: 5.5
POTASSIUM SERPL-SCNC: 4 MMOL/L
PROTEIN URINE: 300 MG/DL
PSA SERPL-MCNC: 1.34 NG/ML
RED BLOOD CELLS URINE: 2 /HPF
SODIUM SERPL-SCNC: 142 MMOL/L
SPECIFIC GRAVITY URINE: 1.02
UROBILINOGEN URINE: 1 MG/DL
WHITE BLOOD CELLS URINE: 1 /HPF

## 2024-01-19 LAB
BACTERIA UR CULT: NORMAL
URINE CYTOLOGY: NORMAL

## 2024-01-30 ENCOUNTER — APPOINTMENT (OUTPATIENT)
Dept: CARDIOLOGY | Facility: CLINIC | Age: 69
End: 2024-01-30

## 2024-01-30 NOTE — DISCUSSION/SUMMARY
[FreeTextEntry1] : The patient is a 68-year-old gentleman GERD, prediabetes, HTN, HLD, Afib,prostate cancer s/p radiation with HTN secondary to noncompliance. #1 Afib- c/w metoprolol, eliquis 5mg bid , compliant #2 HTN-  c/w chlorthalidone,  amlodipine 10 mg, spironolactone 50mg bid, hydralazine and bystolic, renal sono normal,  f/u Dr. Pride as well, compliance with meds and f/u discussed. #3 HLD- c/w atorvastatin #4 Hyperglycemia- We discussed adherence to a low fat low cholesterol, ADA diet, weight loss and regular daily exercise.  #5 GERD- on protonix  #6 - prostate cancer, s/p radiation therapy

## 2024-02-02 ENCOUNTER — APPOINTMENT (OUTPATIENT)
Dept: CT IMAGING | Facility: IMAGING CENTER | Age: 69
End: 2024-02-02
Payer: COMMERCIAL

## 2024-02-02 ENCOUNTER — OUTPATIENT (OUTPATIENT)
Dept: OUTPATIENT SERVICES | Facility: HOSPITAL | Age: 69
LOS: 1 days | End: 2024-02-02
Payer: COMMERCIAL

## 2024-02-02 DIAGNOSIS — Z98.890 OTHER SPECIFIED POSTPROCEDURAL STATES: Chronic | ICD-10-CM

## 2024-02-02 DIAGNOSIS — R31.0 GROSS HEMATURIA: ICD-10-CM

## 2024-02-02 DIAGNOSIS — T14.8 OTHER INJURY OF UNSPECIFIED BODY REGION: Chronic | ICD-10-CM

## 2024-02-02 PROCEDURE — 74176 CT ABD & PELVIS W/O CONTRAST: CPT

## 2024-02-02 PROCEDURE — 74176 CT ABD & PELVIS W/O CONTRAST: CPT | Mod: 26

## 2024-02-09 ENCOUNTER — APPOINTMENT (OUTPATIENT)
Dept: UROLOGY | Facility: CLINIC | Age: 69
End: 2024-02-09
Payer: COMMERCIAL

## 2024-02-09 VITALS
BODY MASS INDEX: 30.08 KG/M2 | SYSTOLIC BLOOD PRESSURE: 237 MMHG | WEIGHT: 260 LBS | OXYGEN SATURATION: 98 % | DIASTOLIC BLOOD PRESSURE: 119 MMHG | HEART RATE: 95 BPM | TEMPERATURE: 97.9 F | HEIGHT: 78 IN | RESPIRATION RATE: 16 BRPM

## 2024-02-09 DIAGNOSIS — R31.0 GROSS HEMATURIA: ICD-10-CM

## 2024-02-09 PROCEDURE — 52000 CYSTOURETHROSCOPY: CPT

## 2024-02-09 PROCEDURE — 99214 OFFICE O/P EST MOD 30 MIN: CPT | Mod: 25

## 2024-02-09 NOTE — ASSESSMENT
[FreeTextEntry1] : Very pleasant 68-year-old gentleman who presents for follow-up of prostate cancer status post radiation, hematospermia, gross hematuria -CT images reviewed demonstrating benign-appearing renal cyst but no kidney stones, hydronephrosis, nor renal masses -Cystoscopy today demonstrates no urothelial lesions -Continue tamsulosin for BPH -Urine culture negative -Urine cytology negative -Urinalysis demonstrates 2 red blood cells per high-power field -Follow-up in 6 months

## 2024-02-09 NOTE — HISTORY OF PRESENT ILLNESS
[FreeTextEntry1] : Very pleasant 68-year-old gentleman presents for follow-up of history of prostate cancer status post radiation, gross hematuria, hematospermia.  He continues to report mild hematospermia.  No gross hematuria.  He recently underwent a CT scan which demonstrated no kidney stones, hydronephrosis, nor renal masses but it does demonstrate simple appearing renal cysts.  He denies flank pain.  No nausea or vomiting.  No other complaints.  He underwent a cystoscopy today which demonstrated no urothelial lesions in the bladder.

## 2024-02-23 ENCOUNTER — APPOINTMENT (OUTPATIENT)
Dept: OTHER | Facility: CLINIC | Age: 69
End: 2024-02-23
Payer: COMMERCIAL

## 2024-02-23 VITALS
DIASTOLIC BLOOD PRESSURE: 78 MMHG | RESPIRATION RATE: 18 BRPM | TEMPERATURE: 97.9 F | HEART RATE: 78 BPM | HEIGHT: 78 IN | BODY MASS INDEX: 30.08 KG/M2 | OXYGEN SATURATION: 98 % | SYSTOLIC BLOOD PRESSURE: 139 MMHG | WEIGHT: 260 LBS

## 2024-02-23 DIAGNOSIS — Z04.9 ENCOUNTER FOR EXAMINATION AND OBSERVATION FOR UNSPECIFIED REASON: ICD-10-CM

## 2024-02-23 DIAGNOSIS — G47.33 OBSTRUCTIVE SLEEP APNEA (ADULT) (PEDIATRIC): ICD-10-CM

## 2024-02-23 DIAGNOSIS — J32.9 CHRONIC SINUSITIS, UNSPECIFIED: ICD-10-CM

## 2024-02-23 PROCEDURE — 99215 OFFICE O/P EST HI 40 MIN: CPT | Mod: 25

## 2024-02-23 PROCEDURE — 94010 BREATHING CAPACITY TEST: CPT

## 2024-02-23 PROCEDURE — 99397 PER PM REEVAL EST PAT 65+ YR: CPT | Mod: 25

## 2024-02-23 RX ORDER — ATORVASTATIN CALCIUM 20 MG/1
20 TABLET, FILM COATED ORAL DAILY
Qty: 90 | Refills: 0 | Status: COMPLETED | COMMUNITY
Start: 2017-04-12 | End: 2024-02-23

## 2024-02-23 RX ORDER — CHOLECALCIFEROL (VITAMIN D3) 50 MCG
50 MCG TABLET ORAL
Qty: 90 | Refills: 3 | Status: COMPLETED | COMMUNITY
End: 2024-02-23

## 2024-02-23 RX ORDER — FAMOTIDINE 20 MG/1
20 TABLET, FILM COATED ORAL
Qty: 90 | Refills: 11 | Status: ACTIVE | COMMUNITY
Start: 2021-01-27 | End: 1900-01-01

## 2024-02-23 RX ORDER — PANTOPRAZOLE 40 MG/1
40 TABLET, DELAYED RELEASE ORAL DAILY
Qty: 30 | Refills: 11 | Status: ACTIVE | COMMUNITY
Start: 2023-02-06 | End: 1900-01-01
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
## 2024-02-23 NOTE — PHYSICAL EXAM
[General Appearance - In No Acute Distress] : in no acute distress [General Appearance - Alert] : alert [Sclera] : the sclera and conjunctiva were normal [Outer Ear] : the ears and nose were normal in appearance [Neck Appearance] : the appearance of the neck was normal [] : no respiratory distress [Auscultation Breath Sounds / Voice Sounds] : lungs were clear to auscultation bilaterally [Heart Sounds] : normal S1 and S2 [Heart Rate And Rhythm] : heart rate was normal and rhythm regular [Abdomen Soft] : soft [Bowel Sounds] : normal bowel sounds [Abdomen Tenderness] : non-tender [Oriented To Time, Place, And Person] : oriented to person, place, and time [Affect] : the affect was normal [Impaired Insight] : insight and judgment were intact [FreeTextEntry1] : trace bilateral lower extremity edema

## 2024-02-23 NOTE — PHYSICAL EXAM
[General Appearance - In No Acute Distress] : in no acute distress [General Appearance - Alert] : alert [Sclera] : the sclera and conjunctiva were normal [Outer Ear] : the ears and nose were normal in appearance [Neck Appearance] : the appearance of the neck was normal [] : no respiratory distress [Auscultation Breath Sounds / Voice Sounds] : lungs were clear to auscultation bilaterally [Heart Rate And Rhythm] : heart rate was normal and rhythm regular [Heart Sounds] : normal S1 and S2 [Abdomen Soft] : soft [Bowel Sounds] : normal bowel sounds [Abdomen Tenderness] : non-tender [Oriented To Time, Place, And Person] : oriented to person, place, and time [Affect] : the affect was normal [Impaired Insight] : insight and judgment were intact [FreeTextEntry1] : trace bilateral lower extremity edema

## 2024-02-23 NOTE — ASSESSMENT
[FreeTextEntry1] : Plan: - Chronic sinusitis: Continue Flonase PRN - GERD: Continue Pantoprazole daily, Famotidine for breakthrough symptoms PRN - ZEYAD: Continue CPAP - Prostate cancer: Continue follow up with Dr. Matson
[FreeTextEntry1] : Plan: - Chronic sinusitis: Continue Flonase PRN - GERD: Continue Pantoprazole daily, Famotidine for breakthrough symptoms PRN - ZEYAD: Continue CPAP - Prostate cancer: Continue follow up with Dr. Matson
sitz bath (specify frequency)/Twice a day and as needed for pain.  Stop when not draining and inminimal pain.

## 2024-02-26 LAB
ALBUMIN SERPL ELPH-MCNC: 4.3 G/DL
ALP BLD-CCNC: 70 U/L
ALT SERPL-CCNC: 24 U/L
ANION GAP SERPL CALC-SCNC: 14 MMOL/L
AST SERPL-CCNC: 27 U/L
BASOPHILS # BLD AUTO: 0.05 K/UL
BASOPHILS NFR BLD AUTO: 0.9 %
BILIRUB SERPL-MCNC: 0.7 MG/DL
BUN SERPL-MCNC: 24 MG/DL
CALCIUM SERPL-MCNC: 9.8 MG/DL
CHLORIDE SERPL-SCNC: 103 MMOL/L
CHOLEST SERPL-MCNC: 222 MG/DL
CO2 SERPL-SCNC: 24 MMOL/L
CREAT SERPL-MCNC: 1.45 MG/DL
EGFR: 53 ML/MIN/1.73M2
EOSINOPHIL # BLD AUTO: 0.03 K/UL
EOSINOPHIL NFR BLD AUTO: 0.5 %
GLUCOSE SERPL-MCNC: 116 MG/DL
HCT VFR BLD CALC: 51.8 %
HDLC SERPL-MCNC: 98 MG/DL
HGB BLD-MCNC: 16.4 G/DL
IMM GRANULOCYTES NFR BLD AUTO: 0.2 %
LDLC SERPL CALC-MCNC: 114 MG/DL
LYMPHOCYTES # BLD AUTO: 1.18 K/UL
LYMPHOCYTES NFR BLD AUTO: 20.9 %
MAN DIFF?: NORMAL
MCHC RBC-ENTMCNC: 26.1 PG
MCHC RBC-ENTMCNC: 31.7 GM/DL
MCV RBC AUTO: 82.5 FL
MONOCYTES # BLD AUTO: 0.66 K/UL
MONOCYTES NFR BLD AUTO: 11.7 %
NEUTROPHILS # BLD AUTO: 3.72 K/UL
NEUTROPHILS NFR BLD AUTO: 65.8 %
NONHDLC SERPL-MCNC: 124 MG/DL
PLATELET # BLD AUTO: 236 K/UL
POTASSIUM SERPL-SCNC: 3.7 MMOL/L
PROT SERPL-MCNC: 6.9 G/DL
RBC # BLD: 6.28 M/UL
RBC # FLD: 17.4 %
SODIUM SERPL-SCNC: 141 MMOL/L
TRIGL SERPL-MCNC: 55 MG/DL
WBC # FLD AUTO: 5.65 K/UL

## 2024-02-28 ENCOUNTER — APPOINTMENT (OUTPATIENT)
Dept: UROLOGY | Facility: CLINIC | Age: 69
End: 2024-02-28
Payer: COMMERCIAL

## 2024-02-28 VITALS
SYSTOLIC BLOOD PRESSURE: 175 MMHG | TEMPERATURE: 96.7 F | HEART RATE: 79 BPM | OXYGEN SATURATION: 99 % | BODY MASS INDEX: 30.08 KG/M2 | WEIGHT: 260 LBS | HEIGHT: 78 IN | DIASTOLIC BLOOD PRESSURE: 102 MMHG

## 2024-02-28 DIAGNOSIS — R36.1 HEMATOSPERMIA: ICD-10-CM

## 2024-02-28 PROCEDURE — G2211 COMPLEX E/M VISIT ADD ON: CPT

## 2024-02-28 PROCEDURE — 99214 OFFICE O/P EST MOD 30 MIN: CPT

## 2024-02-28 RX ORDER — SILVER SULFADIAZINE 10 MG/G
1 CREAM TOPICAL TWICE DAILY
Qty: 1 | Refills: 0 | Status: ACTIVE | COMMUNITY
Start: 2023-03-01 | End: 1900-01-01

## 2024-02-28 NOTE — PHYSICAL EXAM
[General Appearance - In No Acute Distress] : no acute distress [Well Groomed] : well groomed [Normal Appearance] : normal appearance [Edema] : no peripheral edema [Respiration, Rhythm And Depth] : normal respiratory rhythm and effort [Exaggerated Use Of Accessory Muscles For Inspiration] : no accessory muscle use [Abdomen Tenderness] : non-tender [Abdomen Soft] : soft [Costovertebral Angle Tenderness] : no ~M costovertebral angle tenderness [Urinary Bladder Findings] : the bladder was normal on palpation [Normal Station and Gait] : the gait and station were normal for the patient's age [] : no rash [No Focal Deficits] : no focal deficits [Oriented To Time, Place, And Person] : oriented to person, place, and time [Affect] : the affect was normal [Mood] : the mood was normal [No Palpable Adenopathy] : no palpable adenopathy

## 2024-02-28 NOTE — ASSESSMENT
[FreeTextEntry1] : Very pleasant 68-year-old gentleman who presents for follow-up of prostate cancer status post radiation, radiation burns, erectile dysfunction, BPH -Trial of silver sulfadiazine -Will try to obtain medication through [facility] given effect from radiation -Continue tamsulosin for BPH -Continue tadalafil for ED -F/U in 1 month  Patient is being seen today for evaluation and management of a chronic and longitudinal ongoing condition and I am of the primary treating physician

## 2024-02-28 NOTE — HISTORY OF PRESENT ILLNESS
[FreeTextEntry1] : Very pleasant 68-year-old gentleman who presents for follow-up of prostate cancer status post radiation, radiation burns.  He reports that he was unable to obtain silver sulfadiazine from the pharmacy.  He still reports itching and burning like sensation in his groin.  He is interested in trying silver sulfadiazine again as this helped in the past.  He denies dysuria.  No hematuria.  He does report occasional hematospermia.  This is not very bothersome.  He reports no problems after cystoscopy.

## 2024-03-18 RX ORDER — FLUTICASONE PROPIONATE 50 UG/1
50 SPRAY, METERED NASAL
Qty: 3 | Refills: 2 | Status: ACTIVE | COMMUNITY
Start: 2021-03-16 | End: 1900-01-01

## 2024-03-19 NOTE — H&P PST ADULT - HEIGHT IN CM
APPOINTMENT:    Necessary Specialty Appointments: Gynecological Oncology     Provider requires schedule review by Office Nurse - Office to call patient with date and time.    [phone]

## 2024-03-27 ENCOUNTER — APPOINTMENT (OUTPATIENT)
Dept: UROLOGY | Facility: CLINIC | Age: 69
End: 2024-03-27
Payer: COMMERCIAL

## 2024-03-27 VITALS
WEIGHT: 260 LBS | HEIGHT: 78 IN | TEMPERATURE: 97.3 F | SYSTOLIC BLOOD PRESSURE: 202 MMHG | RESPIRATION RATE: 15 BRPM | HEART RATE: 48 BPM | DIASTOLIC BLOOD PRESSURE: 119 MMHG | BODY MASS INDEX: 30.08 KG/M2 | OXYGEN SATURATION: 98 %

## 2024-03-27 DIAGNOSIS — R20.0 ANESTHESIA OF SKIN: ICD-10-CM

## 2024-03-27 DIAGNOSIS — T30.0 BURN OF UNSPECIFIED BODY REGION, UNSPECIFIED DEGREE: ICD-10-CM

## 2024-03-27 PROCEDURE — G2211 COMPLEX E/M VISIT ADD ON: CPT

## 2024-03-27 PROCEDURE — 99214 OFFICE O/P EST MOD 30 MIN: CPT

## 2024-03-27 NOTE — ASSESSMENT
[FreeTextEntry1] : Very pleasant 68-year-old gentleman who presents for follow-up of erectile dysfunction, prostate cancer status post radiation, radiation burns, BPH -Continue silver sulfadiazine for radiation burns -Continue tadalafil as needed for erectile dysfunction-refill sent to the pharmacy -Continue tamsulosin for BPH -Follow-up in 3 months  Patient is being seen today for evaluation and management of a chronic and longitudinal ongoing condition and I am of the primary treating physician

## 2024-03-27 NOTE — PHYSICAL EXAM
[Normal Appearance] : normal appearance [Well Groomed] : well groomed [General Appearance - In No Acute Distress] : no acute distress [Edema] : no peripheral edema [Respiration, Rhythm And Depth] : normal respiratory rhythm and effort [Abdomen Soft] : soft [Exaggerated Use Of Accessory Muscles For Inspiration] : no accessory muscle use [Abdomen Tenderness] : non-tender [Costovertebral Angle Tenderness] : no ~M costovertebral angle tenderness [Urinary Bladder Findings] : the bladder was normal on palpation [Normal Station and Gait] : the gait and station were normal for the patient's age [] : no rash [No Focal Deficits] : no focal deficits [Oriented To Time, Place, And Person] : oriented to person, place, and time [Mood] : the mood was normal [Affect] : the affect was normal [No Palpable Adenopathy] : no palpable adenopathy

## 2024-03-27 NOTE — HISTORY OF PRESENT ILLNESS
[FreeTextEntry1] : Very pleasant 68-year-old gentleman who presents for follow-up of prostate cancer status post radiation, radiation burns.  He reports that silver sulfadiazine has improved itching and burning sensation.  He denies dysuria.  No hematuria.  His blood pressure was noted to be very high today in the office.  He feels well and reports that he just took his blood pressure medicine, however.

## 2024-03-28 ENCOUNTER — NON-APPOINTMENT (OUTPATIENT)
Age: 69
End: 2024-03-28

## 2024-04-16 ENCOUNTER — APPOINTMENT (OUTPATIENT)
Dept: INTERNAL MEDICINE | Facility: CLINIC | Age: 69
End: 2024-04-16
Payer: COMMERCIAL

## 2024-04-16 ENCOUNTER — OUTPATIENT (OUTPATIENT)
Dept: OUTPATIENT SERVICES | Facility: HOSPITAL | Age: 69
LOS: 1 days | End: 2024-04-16
Payer: COMMERCIAL

## 2024-04-16 VITALS
HEART RATE: 94 BPM | BODY MASS INDEX: 30.66 KG/M2 | SYSTOLIC BLOOD PRESSURE: 220 MMHG | OXYGEN SATURATION: 97 % | HEIGHT: 78 IN | WEIGHT: 265 LBS | DIASTOLIC BLOOD PRESSURE: 160 MMHG

## 2024-04-16 VITALS — SYSTOLIC BLOOD PRESSURE: 180 MMHG | DIASTOLIC BLOOD PRESSURE: 100 MMHG

## 2024-04-16 DIAGNOSIS — Z13.39 ENCOUNTER FOR SCREENING EXAM FOR OTHER MENTAL HEALTH AND BEHAVIORAL DISORDERS: ICD-10-CM

## 2024-04-16 DIAGNOSIS — Z98.890 OTHER SPECIFIED POSTPROCEDURAL STATES: Chronic | ICD-10-CM

## 2024-04-16 DIAGNOSIS — N25.81 SECONDARY HYPERPARATHYROIDISM OF RENAL ORIGIN: ICD-10-CM

## 2024-04-16 DIAGNOSIS — Z13.31 ENCOUNTER FOR SCREENING FOR DEPRESSION: ICD-10-CM

## 2024-04-16 DIAGNOSIS — Z00.00 ENCOUNTER FOR GENERAL ADULT MEDICAL EXAMINATION W/OUT ABNORMAL FINDINGS: ICD-10-CM

## 2024-04-16 DIAGNOSIS — E11.9 TYPE 2 DIABETES MELLITUS W/OUT COMPLICATIONS: ICD-10-CM

## 2024-04-16 DIAGNOSIS — I10 ESSENTIAL (PRIMARY) HYPERTENSION: ICD-10-CM

## 2024-04-16 DIAGNOSIS — K21.9 GASTRO-ESOPHAGEAL REFLUX DISEASE W/OUT ESOPHAGITIS: ICD-10-CM

## 2024-04-16 DIAGNOSIS — C61 MALIGNANT NEOPLASM OF PROSTATE: ICD-10-CM

## 2024-04-16 DIAGNOSIS — T14.8 OTHER INJURY OF UNSPECIFIED BODY REGION: Chronic | ICD-10-CM

## 2024-04-16 DIAGNOSIS — I48.91 UNSPECIFIED ATRIAL FIBRILLATION: ICD-10-CM

## 2024-04-16 PROCEDURE — G0439: CPT

## 2024-04-16 PROCEDURE — G0444 DEPRESSION SCREEN ANNUAL: CPT | Mod: 59

## 2024-04-16 PROCEDURE — 99397 PER PM REEVAL EST PAT 65+ YR: CPT

## 2024-04-16 PROCEDURE — G0442 ANNUAL ALCOHOL SCREEN 15 MIN: CPT | Mod: 59

## 2024-04-16 RX ORDER — PHENAZOPYRIDINE HYDROCHLORIDE 200 MG/1
200 TABLET ORAL 3 TIMES DAILY
Refills: 0 | Status: DISCONTINUED | COMMUNITY
Start: 2023-05-11 | End: 2024-04-16

## 2024-04-16 RX ORDER — SPIRONOLACTONE 50 MG/1
50 TABLET ORAL
Qty: 180 | Refills: 3 | Status: ACTIVE | COMMUNITY
Start: 2023-12-01 | End: 1900-01-01

## 2024-04-16 RX ORDER — ERGOCALCIFEROL 1.25 MG/1
1.25 MG CAPSULE, LIQUID FILLED ORAL
Qty: 12 | Refills: 3 | Status: ACTIVE | COMMUNITY
Start: 2022-05-19 | End: 1900-01-01

## 2024-04-23 PROBLEM — I10 HBP (HIGH BLOOD PRESSURE): Status: ACTIVE | Noted: 2021-01-28

## 2024-04-23 PROBLEM — Z13.39 SCREENING FOR ALCOHOLISM: Status: ACTIVE | Noted: 2021-10-27

## 2024-04-23 PROBLEM — N25.81 SECONDARY HYPERPARATHYROIDISM: Status: ACTIVE | Noted: 2023-11-30

## 2024-04-23 PROBLEM — E11.9 TYPE 2 DIABETES MELLITUS: Status: ACTIVE | Noted: 2017-04-12

## 2024-04-23 PROBLEM — K21.9 CHRONIC GERD: Status: ACTIVE | Noted: 2019-02-14

## 2024-04-23 PROBLEM — C61 PROSTATE CANCER: Status: ACTIVE | Noted: 2022-11-01

## 2024-04-23 PROBLEM — Z13.31 SCREENING FOR DEPRESSION: Status: ACTIVE | Noted: 2021-10-27

## 2024-04-23 PROBLEM — I48.91 ATRIAL FIBRILLATION, UNSPECIFIED TYPE: Status: ACTIVE | Noted: 2017-04-10

## 2024-04-23 NOTE — ASSESSMENT
[FreeTextEntry1] : MARIA EUGENIA HERNANDEZ  is a 68 year old male  with history of DM, HTN, hyperlipidemia, prostate cancer and a-fib presented today for comprehensive evaluation.  # HCM -COVID shot: got initial vaccine. no booster, no COVID infection -Flu shot: does not get it -Tdap shot: offered but declined. -Shingles shot: offered but declined. -Pneumonia shot:offered but declined. -colonoscopy: FIT neg 5/6/20. Made referral to GI.  -PHQ2: negative -SBIRT: negative.   # Prostate cancer stable. PSA was 1.34 on 1/17/24. S/P RT, following with urology. No surgery or no chemo tx needed.   # poorly controlled HTN, Afib BP is uncontrolled as 220/160 then 180/100 secondary to medication non-compliance.  Dr. Elder checked his US renal 12/14/23:  stable with b/l benign renal cysts. Cardiology recommended him to take  Eliquis 5mg po bid but he does not take it.  2/23/24: BUN/cr 24/1.45 eGFR 53 Continue take Amlodipine 10mg qd, Aspirin 81mg qd, chlorthalidone 25mg qd, Hydralazine 100mg po bid, Nebivolol  10mg qd, spironolactone 50mg bid.   # HLD Did not report muscle pain at this time and not sure about medication compliance.  Continue take Atorvastatin 20mg po qd and low fat diet. 2/23/24   # T2DM a1c 5.8 on 4/5/23. Diet controlled. Well-controlled at this time. Denied polyuria and polydipsia. To continue to check sugars as directed, low fat low cholesterol, ADA diet, weight loss and regular daily exercise. Reminded of the need for a yearly dilated eye exam. Foot care and daily inspection of feet reiterated. Referred to our ophthalmologist for retina exam.   # GERD stable. tolerating food without issues. -on protonix 40mg qd and pepcid 20mg po qd prn.  -Reviewed with pt lab results by [facility] on 2/23/24. No lab today.  -F/up in 6month or prn.

## 2024-04-23 NOTE — COUNSELING
[Fall prevention counseling provided] : Fall prevention counseling provided [Potential consequences of obesity discussed] : Potential consequences of obesity discussed [Encouraged to maintain food diary] : Encouraged to maintain food diary [Encouraged to increase physical activity] : Encouraged to increase physical activity [Encouraged to use exercise tracking device] : Encouraged to use exercise tracking device [Weigh Self Weekly] : weigh self weekly

## 2024-04-23 NOTE — PHYSICAL EXAM
[No Acute Distress] : no acute distress [Well Nourished] : well nourished [Normal Sclera/Conjunctiva] : normal sclera/conjunctiva [PERRL] : pupils equal round and reactive to light [EOMI] : extraocular movements intact [Normal Outer Ear/Nose] : the outer ears and nose were normal in appearance [Normal Oropharynx] : the oropharynx was normal [No JVD] : no jugular venous distention [No Lymphadenopathy] : no lymphadenopathy [Supple] : supple [Thyroid Normal, No Nodules] : the thyroid was normal and there were no nodules present [No Respiratory Distress] : no respiratory distress  [No Accessory Muscle Use] : no accessory muscle use [Clear to Auscultation] : lungs were clear to auscultation bilaterally [Normal Rate] : normal rate  [Normal S1, S2] : normal S1 and S2 [No Carotid Bruits] : no carotid bruits [No Abdominal Bruit] : a ~M bruit was not heard ~T in the abdomen [Pedal Pulses Present] : the pedal pulses are present [Soft] : abdomen soft [Non Tender] : non-tender [Non-distended] : non-distended [No Masses] : no abdominal mass palpated [No HSM] : no HSM [Normal Bowel Sounds] : normal bowel sounds [No CVA Tenderness] : no CVA  tenderness [No Spinal Tenderness] : no spinal tenderness [No Joint Swelling] : no joint swelling [Grossly Normal Strength/Tone] : grossly normal strength/tone [No Rash] : no rash [Coordination Grossly Intact] : coordination grossly intact [No Focal Deficits] : no focal deficits [Normal Gait] : normal gait [Deep Tendon Reflexes (DTR)] : deep tendon reflexes were 2+ and symmetric [Normal Affect] : the affect was normal [Normal Insight/Judgement] : insight and judgment were intact [de-identified] : irregular heart beat known A-fib and medication non-complinace [de-identified] : Grade1+ b/l pitting edema at ankles. Pt did not take water pills.  [de-identified] : Left lower inguinal area healed with clean surgical scar.

## 2024-04-23 NOTE — HISTORY OF PRESENT ILLNESS
[FreeTextEntry1] : CPE [de-identified] : MARIA EUGENIA HERNANDEZ  is a 68 year old male  with history of DM, HTN, hyperlipidemia, prostate cancer and a-fib presented today for comprehensive evaluation. Last seen by Dr. Bess 4/5/23. Reviewed note by Dr. Win, uro 3/27/24 and Dr. Elder, cardiology 12/14/23.  He came alone and reported stable conditions.  Hx of  non-compliant with his medications but at this time he reports taking medications regularly except Eliquis.  Does follow with the [facility] program. Denied fever, chills,CP, SOB, abdominal pain, n/v/c/d.

## 2024-04-23 NOTE — HEALTH RISK ASSESSMENT
[Good] : ~his/her~  mood as  good [Yes] : Yes [Monthly or less (1 pt)] : Monthly or less (1 point) [1 or 2 (0 pts)] : 1 or 2 (0 points) [Less than monthly (1 pt)] : Less than monthly (1 point) [No] : In the past 12 months have you used drugs other than those required for medical reasons? No [No falls in past year] : Patient reported no falls in the past year [0] : 1) Little interest or pleasure doing things: Not at all (0) [1] : 2) Feeling down, depressed, or hopeless for several days (1) [PHQ-2 Negative - No further assessment needed] : PHQ-2 Negative - No further assessment needed [No Retinopathy] : No retinopathy [Patient reported colonoscopy was abnormal] : Patient reported colonoscopy was abnormal [Health Literacy] : health literacy [With Family] : lives with family [Employed] : employed [] :  [Sexually Active] : sexually active [Feels Safe at Home] : Feels safe at home [Fully functional (bathing, dressing, toileting, transferring, walking, feeding)] : Fully functional (bathing, dressing, toileting, transferring, walking, feeding) [Fully functional (using the telephone, shopping, preparing meals, housekeeping, doing laundry, using] : Fully functional and needs no help or supervision to perform IADLs (using the telephone, shopping, preparing meals, housekeeping, doing laundry, using transportation, managing medications and managing finances) [Patient/Caregiver not ready to engage] : , patient/caregiver not ready to engage [de-identified] : bear, starch, sake [Audit-CScore] : 2 [de-identified] : basketball, swimming, walking hiking, fishing [de-identified] : cut down salt, sweet [[id number]] : 1 [Change in mental status noted] : No change in mental status noted [Language] : denies difficulty with language [High Risk Behavior] : no high risk behavior [Reports changes in hearing] : Reports no changes in hearing [Reports changes in vision] : Reports no changes in vision [Reports changes in dental health] : Reports no changes in dental health [ColonoscopyDate] : 11/18 [ColonoscopyComments] : some polyps noted. to f/u with current GI Dr. 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 [facility], pt did not want hearing aids [de-identified] : seen by ophthalmologist long time ago [de-identified] : teeth broken recently. to see dentist [AdvancecareDate] : 10/21

## 2024-04-30 DIAGNOSIS — N25.81 SECONDARY HYPERPARATHYROIDISM OF RENAL ORIGIN: ICD-10-CM

## 2024-04-30 DIAGNOSIS — Z00.00 ENCOUNTER FOR GENERAL ADULT MEDICAL EXAMINATION WITHOUT ABNORMAL FINDINGS: ICD-10-CM

## 2024-04-30 DIAGNOSIS — Z13.31 ENCOUNTER FOR SCREENING FOR DEPRESSION: ICD-10-CM

## 2024-04-30 DIAGNOSIS — Z13.39 ENCOUNTER FOR SCREENING EXAMINATION FOR OTHER MENTAL HEALTH AND BEHAVIORAL DISORDERS: ICD-10-CM

## 2024-04-30 DIAGNOSIS — E11.9 TYPE 2 DIABETES MELLITUS WITHOUT COMPLICATIONS: ICD-10-CM

## 2024-04-30 DIAGNOSIS — I48.91 UNSPECIFIED ATRIAL FIBRILLATION: ICD-10-CM

## 2024-04-30 DIAGNOSIS — K21.9 GASTRO-ESOPHAGEAL REFLUX DISEASE WITHOUT ESOPHAGITIS: ICD-10-CM

## 2024-04-30 DIAGNOSIS — C61 MALIGNANT NEOPLASM OF PROSTATE: ICD-10-CM

## 2024-05-03 ENCOUNTER — APPOINTMENT (OUTPATIENT)
Dept: PSYCHIATRY | Facility: CLINIC | Age: 69
End: 2024-05-03
Payer: COMMERCIAL

## 2024-05-03 PROCEDURE — 90853 GROUP PSYCHOTHERAPY: CPT | Mod: 95

## 2024-05-10 ENCOUNTER — APPOINTMENT (OUTPATIENT)
Dept: PSYCHIATRY | Facility: CLINIC | Age: 69
End: 2024-05-10
Payer: COMMERCIAL

## 2024-05-10 PROCEDURE — 90853 GROUP PSYCHOTHERAPY: CPT | Mod: 95

## 2024-05-17 ENCOUNTER — APPOINTMENT (OUTPATIENT)
Dept: PSYCHIATRY | Facility: CLINIC | Age: 69
End: 2024-05-17
Payer: COMMERCIAL

## 2024-05-17 PROCEDURE — 90853 GROUP PSYCHOTHERAPY: CPT | Mod: 95

## 2024-05-24 ENCOUNTER — APPOINTMENT (OUTPATIENT)
Dept: PSYCHIATRY | Facility: CLINIC | Age: 69
End: 2024-05-24
Payer: COMMERCIAL

## 2024-05-24 PROCEDURE — 90853 GROUP PSYCHOTHERAPY: CPT | Mod: 95

## 2024-05-31 ENCOUNTER — APPOINTMENT (OUTPATIENT)
Dept: PSYCHIATRY | Facility: CLINIC | Age: 69
End: 2024-05-31
Payer: COMMERCIAL

## 2024-05-31 PROCEDURE — 90853 GROUP PSYCHOTHERAPY: CPT | Mod: 95

## 2024-06-07 ENCOUNTER — APPOINTMENT (OUTPATIENT)
Dept: PSYCHIATRY | Facility: CLINIC | Age: 69
End: 2024-06-07
Payer: COMMERCIAL

## 2024-06-07 PROCEDURE — 90853 GROUP PSYCHOTHERAPY: CPT | Mod: 95

## 2024-06-07 NOTE — HEALTH RISK ASSESSMENT
[Excellent] : ~his/her~  mood as  excellent [Yes] : Yes [Monthly or less (1 pt)] : Monthly or less (1 point) [1 or 2 (0 pts)] : 1 or 2 (0 points) [Never (0 pts)] : Never (0 points) [No] : In the past 12 months have you used drugs other than those required for medical reasons? No [No falls in past year] : Patient reported no falls in the past year [0] : 2) Feeling down, depressed, or hopeless: Not at all (0) [PHQ-2 Negative - No further assessment needed] : PHQ-2 Negative - No further assessment needed [Audit-CScore] : 1 [[id number]] : 0 [Former] : Former [< 15 Years] : < 15 Years [Change in mental status noted] : No change in mental status noted [Language] : denies difficulty with language [Behavior] : denies difficulty with behavior [Learning/Retaining New Information] : denies difficulty learning/retaining new information [Handling Complex Tasks] : denies difficulty handling complex tasks [Reasoning] : denies difficulty with reasoning [Spatial Ability and Orientation] : denies difficulty with spatial ability and orientation [None] : None [Homeless] : homeless [] :  [Fully functional (bathing, dressing, toileting, transferring, walking, feeding)] : Fully functional (bathing, dressing, toileting, transferring, walking, feeding) [Fully functional (using the telephone, shopping, preparing meals, housekeeping, doing laundry, using] : Fully functional and needs no help or supervision to perform IADLs (using the telephone, shopping, preparing meals, housekeeping, doing laundry, using transportation, managing medications and managing finances) [Reports changes in hearing] : Reports no changes in hearing [Reports changes in vision] : Reports no changes in vision [Reports normal functional visual acuity (ie: able to read med bottle)] : Reports normal functional visual acuity [Reports changes in dental health] : Reports no changes in dental health [Smoke Detector] : smoke detector [Seat Belt] :  uses seat belt

## 2024-06-07 NOTE — HISTORY OF PRESENT ILLNESS
[FreeTextEntry1] : Mr. HERNANDEZ is here for an annual physical examination and assessment. [de-identified] : He generally feels well with no specific complaints. His recent health has been good.  Denies headache, dizziness. Denies rash, fatigue, fever, weight loss, anorexia. Denies cough, wheezing. Denies CP, SOB, WHITESIDE, edema, palpitations. Denies abdominal pain, N/V/D/C. Denies BRBPR, melena, dysphagia. Denies dysuria, frequency, hematuria, hesitancy. Denies weakness, numbness, gait instability.

## 2024-06-12 NOTE — REVIEW OF SYSTEMS
Detail Level: Detailed General Sunscreen Counseling: I recommended a broad spectrum sunscreen with a SPF of 30 or higher.  I explained that SPF 30 sunscreens block approximately 97 percent of the sun's harmful rays.  Sunscreens should be applied at least 15 minutes prior to expected sun exposure and then every 2 hours after that as long as sun exposure continues. If swimming or exercising sunscreen should be reapplied every 45 minutes to an hour after getting wet or sweating.  One ounce, or the equivalent of a shot glass full of sunscreen, is adequate to protect the skin not covered by a bathing suit. I also recommended a lip balm with a sunscreen as well. Sun protective clothing can be used in lieu of sunscreen but must be worn the entire time you are exposed to the sun's rays. [As Noted in HPI] : as noted in HPI [SOB on Exertion] : shortness of breath during exertion [Joint Swelling] : joint swelling [Joint Stiffness] : joint stiffness [Itching] : itching [Anxiety] : anxiety [Depression] : depression [Negative] : Heme/Lymph [Dysuria] : dysuria [Recent Weight Gain (___ Lbs)] : no recent weight gain [Recent Weight Loss (___ Lbs)] : no recent weight loss [Loss Of Hearing] : no hearing loss [Cough] : no cough [Orthopnea] : no orthopnea [Skin Wound] : no skin wound [Suicidal] : not suicidal [Muscle Weakness] : no muscle weakness [Feelings Of Weakness] : no feelings of weakness [FreeTextEntry9] : knees

## 2024-06-14 ENCOUNTER — APPOINTMENT (OUTPATIENT)
Dept: PSYCHIATRY | Facility: CLINIC | Age: 69
End: 2024-06-14
Payer: COMMERCIAL

## 2024-06-14 PROCEDURE — 90853 GROUP PSYCHOTHERAPY: CPT | Mod: 95

## 2024-06-17 NOTE — HISTORY OF PRESENT ILLNESS
Detail Level: Zone [Preoperative Visit] : for a medical evaluation prior to surgery [Scheduled Procedure ___] : a [unfilled] [Date of Surgery ___] : on [unfilled] [Surgeon Name ___] : surgeon: [unfilled] [FreeTextEntry1] : Chad forgot his amlodipine  and olmesartan. He has a left inguinal hernia that is incarcerated. He continues to work for [employer]. No chest pain, palpitations or shortness of breath.

## 2024-06-20 ENCOUNTER — APPOINTMENT (OUTPATIENT)
Dept: INTERNAL MEDICINE | Facility: CLINIC | Age: 69
End: 2024-06-20

## 2024-06-28 ENCOUNTER — APPOINTMENT (OUTPATIENT)
Dept: UROLOGY | Facility: CLINIC | Age: 69
End: 2024-06-28
Payer: COMMERCIAL

## 2024-06-28 ENCOUNTER — APPOINTMENT (OUTPATIENT)
Dept: PSYCHIATRY | Facility: CLINIC | Age: 69
End: 2024-06-28
Payer: COMMERCIAL

## 2024-06-28 VITALS
DIASTOLIC BLOOD PRESSURE: 120 MMHG | OXYGEN SATURATION: 97 % | HEIGHT: 78 IN | TEMPERATURE: 97.9 F | SYSTOLIC BLOOD PRESSURE: 246 MMHG | WEIGHT: 262 LBS | BODY MASS INDEX: 30.31 KG/M2 | HEART RATE: 92 BPM

## 2024-06-28 DIAGNOSIS — C61 MALIGNANT NEOPLASM OF PROSTATE: ICD-10-CM

## 2024-06-28 DIAGNOSIS — N40.1 BENIGN PROSTATIC HYPERPLASIA WITH LOWER URINARY TRACT SYMPMS: ICD-10-CM

## 2024-06-28 DIAGNOSIS — N52.9 MALE ERECTILE DYSFUNCTION, UNSPECIFIED: ICD-10-CM

## 2024-06-28 DIAGNOSIS — N13.8 BENIGN PROSTATIC HYPERPLASIA WITH LOWER URINARY TRACT SYMPMS: ICD-10-CM

## 2024-06-28 PROCEDURE — 90853 GROUP PSYCHOTHERAPY: CPT | Mod: 95

## 2024-06-28 PROCEDURE — G2211 COMPLEX E/M VISIT ADD ON: CPT

## 2024-06-28 PROCEDURE — 99214 OFFICE O/P EST MOD 30 MIN: CPT

## 2024-07-01 LAB — PSA SERPL-MCNC: 0.74 NG/ML

## 2024-07-12 ENCOUNTER — APPOINTMENT (OUTPATIENT)
Dept: PSYCHIATRY | Facility: CLINIC | Age: 69
End: 2024-07-12
Payer: COMMERCIAL

## 2024-07-12 PROCEDURE — 90853 GROUP PSYCHOTHERAPY: CPT | Mod: 95

## 2024-07-19 ENCOUNTER — APPOINTMENT (OUTPATIENT)
Dept: PSYCHIATRY | Facility: CLINIC | Age: 69
End: 2024-07-19
Payer: COMMERCIAL

## 2024-07-19 PROCEDURE — 90853 GROUP PSYCHOTHERAPY: CPT | Mod: 95

## 2024-07-26 ENCOUNTER — APPOINTMENT (OUTPATIENT)
Dept: PSYCHIATRY | Facility: CLINIC | Age: 69
End: 2024-07-26
Payer: COMMERCIAL

## 2024-07-26 PROCEDURE — 90853 GROUP PSYCHOTHERAPY: CPT | Mod: 95

## 2024-08-09 ENCOUNTER — APPOINTMENT (OUTPATIENT)
Dept: UROLOGY | Facility: CLINIC | Age: 69
End: 2024-08-09

## 2024-08-09 ENCOUNTER — APPOINTMENT (OUTPATIENT)
Dept: PSYCHIATRY | Facility: CLINIC | Age: 69
End: 2024-08-09

## 2024-08-09 PROCEDURE — 90853 GROUP PSYCHOTHERAPY: CPT | Mod: 95

## 2024-08-16 ENCOUNTER — APPOINTMENT (OUTPATIENT)
Dept: PSYCHIATRY | Facility: CLINIC | Age: 69
End: 2024-08-16
Payer: COMMERCIAL

## 2024-08-16 PROCEDURE — 90853 GROUP PSYCHOTHERAPY: CPT | Mod: 95

## 2024-08-23 ENCOUNTER — APPOINTMENT (OUTPATIENT)
Dept: PSYCHIATRY | Facility: CLINIC | Age: 69
End: 2024-08-23
Payer: COMMERCIAL

## 2024-08-23 PROCEDURE — 90853 GROUP PSYCHOTHERAPY: CPT | Mod: 95

## 2024-09-06 ENCOUNTER — APPOINTMENT (OUTPATIENT)
Dept: PSYCHIATRY | Facility: CLINIC | Age: 69
End: 2024-09-06
Payer: COMMERCIAL

## 2024-09-06 PROCEDURE — 90853 GROUP PSYCHOTHERAPY: CPT | Mod: 95

## 2024-09-09 ENCOUNTER — APPOINTMENT (OUTPATIENT)
Dept: INTERNAL MEDICINE | Facility: CLINIC | Age: 69
End: 2024-09-09

## 2024-09-10 NOTE — PROGRESS NOTE ADULT - PROBLEM SELECTOR PROBLEM 4
Appears as if US is already scheduled.  
Pt called she has an US scheduled for  and a new order needs to be placed per central scheduling the order previously is   
Hyperlipemia
Hyperlipemia

## 2024-09-13 ENCOUNTER — APPOINTMENT (OUTPATIENT)
Dept: PSYCHIATRY | Facility: CLINIC | Age: 69
End: 2024-09-13
Payer: COMMERCIAL

## 2024-09-13 PROCEDURE — 90853 GROUP PSYCHOTHERAPY: CPT | Mod: 95

## 2024-09-17 ENCOUNTER — NON-APPOINTMENT (OUTPATIENT)
Age: 69
End: 2024-09-17

## 2024-09-17 ENCOUNTER — OUTPATIENT (OUTPATIENT)
Dept: OUTPATIENT SERVICES | Facility: HOSPITAL | Age: 69
LOS: 1 days | End: 2024-09-17
Payer: COMMERCIAL

## 2024-09-17 ENCOUNTER — APPOINTMENT (OUTPATIENT)
Dept: INTERNAL MEDICINE | Facility: CLINIC | Age: 69
End: 2024-09-17
Payer: COMMERCIAL

## 2024-09-17 VITALS
WEIGHT: 260 LBS | DIASTOLIC BLOOD PRESSURE: 100 MMHG | HEIGHT: 78 IN | HEART RATE: 67 BPM | SYSTOLIC BLOOD PRESSURE: 200 MMHG | BODY MASS INDEX: 30.08 KG/M2

## 2024-09-17 VITALS — DIASTOLIC BLOOD PRESSURE: 98 MMHG | SYSTOLIC BLOOD PRESSURE: 184 MMHG

## 2024-09-17 DIAGNOSIS — R10.84 GENERALIZED ABDOMINAL PAIN: ICD-10-CM

## 2024-09-17 DIAGNOSIS — R10.13 EPIGASTRIC PAIN: ICD-10-CM

## 2024-09-17 DIAGNOSIS — Z03.89 ENCOUNTER FOR OBSERVATION FOR OTHER SUSPECTED DISEASES AND CONDITIONS RULED OUT: ICD-10-CM

## 2024-09-17 DIAGNOSIS — Z01.818 ENCOUNTER FOR OTHER PREPROCEDURAL EXAMINATION: ICD-10-CM

## 2024-09-17 DIAGNOSIS — Z13.39 ENCOUNTER FOR SCREENING EXAM FOR OTHER MENTAL HEALTH AND BEHAVIORAL DISORDERS: ICD-10-CM

## 2024-09-17 DIAGNOSIS — T14.8 OTHER INJURY OF UNSPECIFIED BODY REGION: Chronic | ICD-10-CM

## 2024-09-17 DIAGNOSIS — R73.03 PREDIABETES.: ICD-10-CM

## 2024-09-17 DIAGNOSIS — I10 ESSENTIAL (PRIMARY) HYPERTENSION: ICD-10-CM

## 2024-09-17 DIAGNOSIS — Z98.890 OTHER SPECIFIED POSTPROCEDURAL STATES: Chronic | ICD-10-CM

## 2024-09-17 DIAGNOSIS — Z87.438 PERSONAL HISTORY OF OTHER DISEASES OF MALE GENITAL ORGANS: ICD-10-CM

## 2024-09-17 DIAGNOSIS — R19.7 DIARRHEA, UNSPECIFIED: ICD-10-CM

## 2024-09-17 DIAGNOSIS — R10.32 LEFT LOWER QUADRANT PAIN: ICD-10-CM

## 2024-09-17 DIAGNOSIS — S39.92XA UNSPECIFIED INJURY OF LOWER BACK, INITIAL ENCOUNTER: ICD-10-CM

## 2024-09-17 DIAGNOSIS — Z87.898 PERSONAL HISTORY OF OTHER SPECIFIED CONDITIONS: ICD-10-CM

## 2024-09-17 DIAGNOSIS — M54.31 SCIATICA, RIGHT SIDE: ICD-10-CM

## 2024-09-17 DIAGNOSIS — N32.89 OTHER SPECIFIED DISORDERS OF BLADDER: ICD-10-CM

## 2024-09-17 DIAGNOSIS — Z00.00 ENCOUNTER FOR GENERAL ADULT MEDICAL EXAMINATION W/OUT ABNORMAL FINDINGS: ICD-10-CM

## 2024-09-17 DIAGNOSIS — R10.12 RIGHT UPPER QUADRANT PAIN: ICD-10-CM

## 2024-09-17 DIAGNOSIS — H61.23 IMPACTED CERUMEN, BILATERAL: ICD-10-CM

## 2024-09-17 DIAGNOSIS — M54.12 RADICULOPATHY, CERVICAL REGION: ICD-10-CM

## 2024-09-17 DIAGNOSIS — I1A.0 RESISTANT HYPERTENSION: ICD-10-CM

## 2024-09-17 DIAGNOSIS — Z91.199 PATIENT'S NONCOMPLIANCE WITH OTHER MEDICAL TREATMENT AND REGIMEN DUE TO UNSPECIFIED REASON: ICD-10-CM

## 2024-09-17 DIAGNOSIS — M54.41 LUMBAGO WITH SCIATICA, LEFT SIDE: ICD-10-CM

## 2024-09-17 DIAGNOSIS — K21.9 GASTRO-ESOPHAGEAL REFLUX DISEASE W/OUT ESOPHAGITIS: ICD-10-CM

## 2024-09-17 DIAGNOSIS — H92.03 OTALGIA, BILATERAL: ICD-10-CM

## 2024-09-17 DIAGNOSIS — M54.14 RADICULOPATHY, THORACIC REGION: ICD-10-CM

## 2024-09-17 DIAGNOSIS — Z13.31 ENCOUNTER FOR SCREENING FOR DEPRESSION: ICD-10-CM

## 2024-09-17 DIAGNOSIS — C61 MALIGNANT NEOPLASM OF PROSTATE: ICD-10-CM

## 2024-09-17 DIAGNOSIS — Z86.19 PERSONAL HISTORY OF OTHER INFECTIOUS AND PARASITIC DISEASES: ICD-10-CM

## 2024-09-17 DIAGNOSIS — J06.9 ACUTE UPPER RESPIRATORY INFECTION, UNSPECIFIED: ICD-10-CM

## 2024-09-17 DIAGNOSIS — G89.29 LUMBAGO WITH SCIATICA, LEFT SIDE: ICD-10-CM

## 2024-09-17 DIAGNOSIS — L91.0 HYPERTROPHIC SCAR: ICD-10-CM

## 2024-09-17 DIAGNOSIS — M54.42 LUMBAGO WITH SCIATICA, LEFT SIDE: ICD-10-CM

## 2024-09-17 DIAGNOSIS — N50.9 DISORDER OF MALE GENITAL ORGANS, UNSPECIFIED: ICD-10-CM

## 2024-09-17 DIAGNOSIS — R94.31 ABNORMAL ELECTROCARDIOGRAM [ECG] [EKG]: ICD-10-CM

## 2024-09-17 DIAGNOSIS — E55.9 VITAMIN D DEFICIENCY, UNSPECIFIED: ICD-10-CM

## 2024-09-17 DIAGNOSIS — I48.91 UNSPECIFIED ATRIAL FIBRILLATION: ICD-10-CM

## 2024-09-17 DIAGNOSIS — H69.93 UNSPECIFIED EUSTACHIAN TUBE DISORDER, BILATERAL: ICD-10-CM

## 2024-09-17 DIAGNOSIS — R22.32 LOCALIZED SWELLING, MASS AND LUMP, LEFT UPPER LIMB: ICD-10-CM

## 2024-09-17 DIAGNOSIS — K40.30 UNILATERAL INGUINAL HERNIA, WITH OBSTRUCTION, W/OUT GANGRENE, NOT SPECIFIED AS RECURRENT: ICD-10-CM

## 2024-09-17 DIAGNOSIS — Z12.9 ENCOUNTER FOR SCREENING FOR MALIGNANT NEOPLASM, SITE UNSPECIFIED: ICD-10-CM

## 2024-09-17 DIAGNOSIS — Z87.19 PERSONAL HISTORY OF OTHER DISEASES OF THE DIGESTIVE SYSTEM: ICD-10-CM

## 2024-09-17 DIAGNOSIS — R97.20 ELEVATED PROSTATE, SPECIFIC ANTIGEN [PSA]: ICD-10-CM

## 2024-09-17 DIAGNOSIS — R10.11 RIGHT UPPER QUADRANT PAIN: ICD-10-CM

## 2024-09-17 DIAGNOSIS — K29.30 CHRONIC SUPERFICIAL GASTRITIS W/OUT BLEEDING: ICD-10-CM

## 2024-09-17 DIAGNOSIS — Z04.9 ENCOUNTER FOR EXAMINATION AND OBSERVATION FOR UNSPECIFIED REASON: ICD-10-CM

## 2024-09-17 DIAGNOSIS — R82.90 UNSPECIFIED ABNORMAL FINDINGS IN URINE: ICD-10-CM

## 2024-09-17 DIAGNOSIS — N18.30 CHRONIC KIDNEY DISEASE, STAGE 3 UNSPECIFIED: ICD-10-CM

## 2024-09-17 DIAGNOSIS — R22.31 LOCALIZED SWELLING, MASS AND LUMP, RIGHT UPPER LIMB: ICD-10-CM

## 2024-09-17 DIAGNOSIS — D58.2 OTHER HEMOGLOBINOPATHIES: ICD-10-CM

## 2024-09-17 DIAGNOSIS — G89.29 LEFT LOWER QUADRANT PAIN: ICD-10-CM

## 2024-09-17 DIAGNOSIS — R68.83 CHILLS (WITHOUT FEVER): ICD-10-CM

## 2024-09-17 DIAGNOSIS — Z86.69 PERSONAL HISTORY OF OTHER DISEASES OF THE NERVOUS SYSTEM AND SENSE ORGANS: ICD-10-CM

## 2024-09-17 DIAGNOSIS — Z87.39 PERSONAL HISTORY OF OTHER DISEASES OF THE MUSCULOSKELETAL SYSTEM AND CONNECTIVE TISSUE: ICD-10-CM

## 2024-09-17 DIAGNOSIS — S46.819A STRAIN OF OTHER MUSCLES, FASCIA AND TENDONS AT SHOULDER AND UPPER ARM LEVEL, UNSPECIFIED ARM, INITIAL ENCOUNTER: ICD-10-CM

## 2024-09-17 PROCEDURE — G2211 COMPLEX E/M VISIT ADD ON: CPT | Mod: NC

## 2024-09-17 PROCEDURE — 99215 OFFICE O/P EST HI 40 MIN: CPT

## 2024-09-17 PROCEDURE — 83036 HEMOGLOBIN GLYCOSYLATED A1C: CPT

## 2024-09-17 PROCEDURE — G0463: CPT

## 2024-09-17 PROCEDURE — 82306 VITAMIN D 25 HYDROXY: CPT

## 2024-09-17 NOTE — HISTORY OF PRESENT ILLNESS
[FreeTextEntry1] : Establish Care/Follow up  [de-identified] : 68-year-old male with history of prediabetes, resistant HTN, CKD3, prostate cancer s/p radiation, a-fib, H. pylori s/p treatment, intestinal metaplasia, bilateral renal cysts presented today for a follow up.   Lumbar radiculopathy- Patient reports low back pain radiating down both legs with numbness and tingling. No fevers, no incontinence, has prostate cancer but no mets to bone. Interested in spine referral.   Prostate cancer: s/p radiation, has ED as a result, BPH. Sees Dr. Matson.   Atrial fibrillation: On aspirin every other day. Not on Eliquis given that he does not like the side effects, not on metoprolol. Due for follow up with cardiology Dr. Elder   CKD: sees Dr. 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   HLD: atorvastatin  GERD: PPI, pepcid  Intestinal metaplasia: seen on endoscopy in 2020, GI recommended repeat in two years. Amenable to referral today.   HCM:  - ultrasound of abdominal aorta given past smoker  - A1C, vitamin D today  Deferred vaccinations  - GI referal for colonoscopy   SH: three children- son graduated from college, daughter just had baby boy in [place] (his first grandchild), lives with significant other  drives heavy equipment, loves watching sports, fishing. [facility] [employer].

## 2024-09-17 NOTE — PHYSICAL EXAM
[No Acute Distress] : no acute distress [Well-Appearing] : well-appearing [No Respiratory Distress] : no respiratory distress  [No Accessory Muscle Use] : no accessory muscle use [Clear to Auscultation] : lungs were clear to auscultation bilaterally [Normal Rate] : normal rate  [Regular Rhythm] : with a regular rhythm [Normal S1, S2] : normal S1 and S2 [No Edema] : there was no peripheral edema [Soft] : abdomen soft [Non Tender] : non-tender [Non-distended] : non-distended [No Rash] : no rash [Normal Gait] : normal gait [Normal] : affect was normal and insight and judgment were intact

## 2024-09-17 NOTE — HEALTH RISK ASSESSMENT
[Former] : Former [> 15 Years] : > 15 Years [Yes] : Yes [2 - 4 times a month (2 pts)] : 2-4 times a month (2 points) [3 or 4 (1 pt)] : 3 or 4  (1 point) [Never (0 pts)] : Never (0 points) [No] : In the past 12 months have you used drugs other than those required for medical reasons? No [0] : 2) Feeling down, depressed, or hopeless: Not at all (0) [PHQ-2 Negative - No further assessment needed] : PHQ-2 Negative - No further assessment needed [Audit-CScore] : 3 [de-identified] : marijuana in the past 30 years ago  [[id number]] : 0

## 2024-09-17 NOTE — ASSESSMENT
[FreeTextEntry1] : 68-year-old male with history of prediabetes, resistant HTN, CKD3, prostate cancer s/p radiation, a-fib, H. pylori s/p treatment, intestinal metaplasia, bilateral renal cysts presented today for a follow up.  Lumbar radiculopathy- Patient reports low back pain radiating down both legs with numbness and tingling. No fevers, no incontinence, has prostate cancer but no mets to bone. Interested in spine referral.  Prostate cancer: s/p radiation, has ED as a result, BPH. Sees Dr. Matson.  Atrial fibrillation: On aspirin every other day. Not on Eliquis given that he does not like the side effects, not on metoprolol. Due for follow up with cardiology Dr. Elder  CKD: sees Dr. 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 Overdue for follow up with Nephrology   HTN: chlorthalidone, amlodipine, spironolactone, hydralazine and bystolic, did not take his medications today as he was rushing out the door.  HLD: atorvastatin  GERD: PPI, pepcid  Intestinal metaplasia: seen on endoscopy in 2020, GI recommended repeat in two years. Amenable to referral today.  HCM: - ultrasound of abdominal aorta given past smoker - A1C, vitamin D today Deferred vaccinations - GI referral for colonoscopy  RTC in one month for follow up

## 2024-09-18 LAB
25(OH)D3 SERPL-MCNC: 45.8 NG/ML
ESTIMATED AVERAGE GLUCOSE: 123 MG/DL
HBA1C MFR BLD HPLC: 5.9 %

## 2024-09-20 ENCOUNTER — APPOINTMENT (OUTPATIENT)
Dept: PSYCHIATRY | Facility: CLINIC | Age: 69
End: 2024-09-20
Payer: COMMERCIAL

## 2024-09-20 PROCEDURE — 90853 GROUP PSYCHOTHERAPY: CPT | Mod: 95

## 2024-09-24 DIAGNOSIS — G89.29 OTHER CHRONIC PAIN: ICD-10-CM

## 2024-09-24 DIAGNOSIS — E55.9 VITAMIN D DEFICIENCY, UNSPECIFIED: ICD-10-CM

## 2024-09-24 DIAGNOSIS — N18.30 CHRONIC KIDNEY DISEASE, STAGE 3 UNSPECIFIED: ICD-10-CM

## 2024-09-24 DIAGNOSIS — I1A.0 RESISTANT HYPERTENSION: ICD-10-CM

## 2024-09-24 DIAGNOSIS — R73.03 PREDIABETES: ICD-10-CM

## 2024-09-24 DIAGNOSIS — I48.91 UNSPECIFIED ATRIAL FIBRILLATION: ICD-10-CM

## 2024-09-24 DIAGNOSIS — K21.9 GASTRO-ESOPHAGEAL REFLUX DISEASE WITHOUT ESOPHAGITIS: ICD-10-CM

## 2024-09-24 DIAGNOSIS — M54.41 LUMBAGO WITH SCIATICA, RIGHT SIDE: ICD-10-CM

## 2024-09-24 DIAGNOSIS — M54.42 LUMBAGO WITH SCIATICA, LEFT SIDE: ICD-10-CM

## 2024-09-25 ENCOUNTER — APPOINTMENT (OUTPATIENT)
Dept: ORTHOPEDIC SURGERY | Facility: CLINIC | Age: 69
End: 2024-09-25

## 2024-09-27 ENCOUNTER — APPOINTMENT (OUTPATIENT)
Dept: PSYCHIATRY | Facility: CLINIC | Age: 69
End: 2024-09-27
Payer: COMMERCIAL

## 2024-09-27 PROCEDURE — 90853 GROUP PSYCHOTHERAPY: CPT | Mod: 95

## 2024-10-04 ENCOUNTER — APPOINTMENT (OUTPATIENT)
Dept: UROLOGY | Facility: CLINIC | Age: 69
End: 2024-10-04
Payer: COMMERCIAL

## 2024-10-04 VITALS
DIASTOLIC BLOOD PRESSURE: 152 MMHG | OXYGEN SATURATION: 98 % | TEMPERATURE: 96.8 F | SYSTOLIC BLOOD PRESSURE: 247 MMHG | BODY MASS INDEX: 30.89 KG/M2 | WEIGHT: 267 LBS | HEART RATE: 57 BPM | HEIGHT: 78 IN

## 2024-10-04 DIAGNOSIS — C61 MALIGNANT NEOPLASM OF PROSTATE: ICD-10-CM

## 2024-10-04 DIAGNOSIS — N52.9 MALE ERECTILE DYSFUNCTION, UNSPECIFIED: ICD-10-CM

## 2024-10-04 PROCEDURE — 99214 OFFICE O/P EST MOD 30 MIN: CPT

## 2024-10-04 PROCEDURE — G2211 COMPLEX E/M VISIT ADD ON: CPT

## 2024-10-04 NOTE — ASSESSMENT
[FreeTextEntry1] : Very pleasant 68-year-old gentleman who presents for follow-up of BPH, erectile dysfunction, prostate cancer status post radiation -Continue tamsulosin-refill sent to the pharmacy -Continue tadalafil for erectile dysfunction-refill sent to the pharmacy -PSA 0.74 from 6/2024; repeat today -Creatinine 1.45; repeat -A1c 5.9  -Follow-up in 6 months  Patient is being seen today for evaluation and management of a chronic and longitudinal ongoing condition and I am of the primary treating physician

## 2024-10-04 NOTE — HISTORY OF PRESENT ILLNESS
[FreeTextEntry1] : Very pleasant 68-year-old gentleman who presents for follow-up of prostate cancer status post radiation, erectile dysfunction, BPH.   He denies dysuria.  No hematuria.  He feels well.  He continues to use tadalafil for erectile dysfunction and tamsulosin for BPH. He reports that he is currently being evaluated for numbness in his feet.    Recently became a grandfather for the first time

## 2024-10-07 LAB
ANION GAP SERPL CALC-SCNC: 17 MMOL/L
BUN SERPL-MCNC: 21 MG/DL
CALCIUM SERPL-MCNC: 9.2 MG/DL
CHLORIDE SERPL-SCNC: 103 MMOL/L
CO2 SERPL-SCNC: 24 MMOL/L
CREAT SERPL-MCNC: 1.59 MG/DL
EGFR: 48 ML/MIN/1.73M2
GLUCOSE SERPL-MCNC: 85 MG/DL
POTASSIUM SERPL-SCNC: 4.1 MMOL/L
PSA SERPL-MCNC: 0.67 NG/ML
SODIUM SERPL-SCNC: 143 MMOL/L

## 2024-10-11 ENCOUNTER — APPOINTMENT (OUTPATIENT)
Dept: PSYCHIATRY | Facility: CLINIC | Age: 69
End: 2024-10-11
Payer: COMMERCIAL

## 2024-10-11 PROCEDURE — 90853 GROUP PSYCHOTHERAPY: CPT | Mod: 95

## 2024-10-17 ENCOUNTER — OUTPATIENT (OUTPATIENT)
Dept: OUTPATIENT SERVICES | Facility: HOSPITAL | Age: 69
LOS: 1 days | End: 2024-10-17
Payer: COMMERCIAL

## 2024-10-17 ENCOUNTER — APPOINTMENT (OUTPATIENT)
Dept: INTERNAL MEDICINE | Facility: CLINIC | Age: 69
End: 2024-10-17
Payer: COMMERCIAL

## 2024-10-17 VITALS
WEIGHT: 272 LBS | DIASTOLIC BLOOD PRESSURE: 120 MMHG | HEIGHT: 78 IN | OXYGEN SATURATION: 98 % | SYSTOLIC BLOOD PRESSURE: 210 MMHG | HEART RATE: 85 BPM | BODY MASS INDEX: 31.47 KG/M2

## 2024-10-17 VITALS — DIASTOLIC BLOOD PRESSURE: 98 MMHG | SYSTOLIC BLOOD PRESSURE: 188 MMHG

## 2024-10-17 DIAGNOSIS — N18.30 CHRONIC KIDNEY DISEASE, STAGE 3 UNSPECIFIED: ICD-10-CM

## 2024-10-17 DIAGNOSIS — I1A.0 RESISTANT HYPERTENSION: ICD-10-CM

## 2024-10-17 DIAGNOSIS — Z00.00 ENCOUNTER FOR GENERAL ADULT MEDICAL EXAMINATION W/OUT ABNORMAL FINDINGS: ICD-10-CM

## 2024-10-17 DIAGNOSIS — G89.29 LUMBAGO WITH SCIATICA, LEFT SIDE: ICD-10-CM

## 2024-10-17 DIAGNOSIS — M51.26 OTHER INTERVERTEBRAL DISC DISPLACEMENT, LUMBAR REGION: ICD-10-CM

## 2024-10-17 DIAGNOSIS — E78.00 PURE HYPERCHOLESTEROLEMIA, UNSPECIFIED: ICD-10-CM

## 2024-10-17 DIAGNOSIS — M50.20 OTHER CERVICAL DISC DISPLACEMENT, UNSPECIFIED CERVICAL REGION: ICD-10-CM

## 2024-10-17 DIAGNOSIS — G47.33 OBSTRUCTIVE SLEEP APNEA (ADULT) (PEDIATRIC): ICD-10-CM

## 2024-10-17 DIAGNOSIS — R73.03 PREDIABETES.: ICD-10-CM

## 2024-10-17 DIAGNOSIS — Z98.890 OTHER SPECIFIED POSTPROCEDURAL STATES: Chronic | ICD-10-CM

## 2024-10-17 DIAGNOSIS — T14.8 OTHER INJURY OF UNSPECIFIED BODY REGION: Chronic | ICD-10-CM

## 2024-10-17 DIAGNOSIS — I48.91 UNSPECIFIED ATRIAL FIBRILLATION: ICD-10-CM

## 2024-10-17 DIAGNOSIS — I10 ESSENTIAL (PRIMARY) HYPERTENSION: ICD-10-CM

## 2024-10-17 DIAGNOSIS — Z87.438 PERSONAL HISTORY OF OTHER DISEASES OF MALE GENITAL ORGANS: ICD-10-CM

## 2024-10-17 DIAGNOSIS — R22.32 LOCALIZED SWELLING, MASS AND LUMP, LEFT UPPER LIMB: ICD-10-CM

## 2024-10-17 DIAGNOSIS — K31.A0 GASTRIC INTESTINAL METAPLASIA, UNSPECIFIED: ICD-10-CM

## 2024-10-17 DIAGNOSIS — E78.5 HYPERLIPIDEMIA, UNSPECIFIED: ICD-10-CM

## 2024-10-17 DIAGNOSIS — M54.42 LUMBAGO WITH SCIATICA, LEFT SIDE: ICD-10-CM

## 2024-10-17 DIAGNOSIS — M54.41 LUMBAGO WITH SCIATICA, LEFT SIDE: ICD-10-CM

## 2024-10-17 PROCEDURE — G2211 COMPLEX E/M VISIT ADD ON: CPT

## 2024-10-17 PROCEDURE — 99215 OFFICE O/P EST HI 40 MIN: CPT

## 2024-10-17 PROCEDURE — G0463: CPT

## 2024-10-18 PROBLEM — M50.20 HERNIATED CERVICAL DISC: Status: RESOLVED | Noted: 2019-11-05 | Resolved: 2024-10-18

## 2024-10-18 PROBLEM — Z87.438 HISTORY OF HYDROCELE: Status: RESOLVED | Noted: 2018-03-09 | Resolved: 2024-10-18

## 2024-10-18 PROBLEM — E78.00 HYPERCHOLESTEROLEMIA: Noted: 2017-04-11

## 2024-10-18 PROBLEM — E78.5 HYPERLIPIDEMIA: Status: ACTIVE | Noted: 2024-10-18

## 2024-10-18 PROBLEM — I1A.0 RESISTANT HYPERTENSION: Noted: 2024-10-18

## 2024-10-18 PROBLEM — M51.26 LUMBAR DISC HERNIATION: Noted: 2019-11-05

## 2024-10-25 ENCOUNTER — APPOINTMENT (OUTPATIENT)
Dept: PSYCHIATRY | Facility: CLINIC | Age: 69
End: 2024-10-25
Payer: COMMERCIAL

## 2024-10-25 PROCEDURE — 90853 GROUP PSYCHOTHERAPY: CPT | Mod: 95

## 2024-10-28 DIAGNOSIS — R73.03 PREDIABETES: ICD-10-CM

## 2024-10-28 DIAGNOSIS — M54.41 LUMBAGO WITH SCIATICA, RIGHT SIDE: ICD-10-CM

## 2024-10-28 DIAGNOSIS — M54.42 LUMBAGO WITH SCIATICA, LEFT SIDE: ICD-10-CM

## 2024-10-28 DIAGNOSIS — K31.A0 GASTRIC INTESTINAL METAPLASIA, UNSPECIFIED: ICD-10-CM

## 2024-10-28 DIAGNOSIS — I48.91 UNSPECIFIED ATRIAL FIBRILLATION: ICD-10-CM

## 2024-10-28 DIAGNOSIS — G47.33 OBSTRUCTIVE SLEEP APNEA (ADULT) (PEDIATRIC): ICD-10-CM

## 2024-10-28 DIAGNOSIS — N18.30 CHRONIC KIDNEY DISEASE, STAGE 3 UNSPECIFIED: ICD-10-CM

## 2024-10-28 DIAGNOSIS — E78.5 HYPERLIPIDEMIA, UNSPECIFIED: ICD-10-CM

## 2024-10-28 DIAGNOSIS — G89.29 OTHER CHRONIC PAIN: ICD-10-CM

## 2024-10-28 DIAGNOSIS — I1A.0 RESISTANT HYPERTENSION: ICD-10-CM

## 2024-11-01 ENCOUNTER — APPOINTMENT (OUTPATIENT)
Dept: PSYCHIATRY | Facility: CLINIC | Age: 69
End: 2024-11-01
Payer: COMMERCIAL

## 2024-11-01 PROCEDURE — 90853 GROUP PSYCHOTHERAPY: CPT | Mod: 95

## 2024-11-08 ENCOUNTER — APPOINTMENT (OUTPATIENT)
Dept: PSYCHIATRY | Facility: CLINIC | Age: 69
End: 2024-11-08
Payer: COMMERCIAL

## 2024-11-08 PROCEDURE — 90853 GROUP PSYCHOTHERAPY: CPT | Mod: 95

## 2024-11-26 ENCOUNTER — OUTPATIENT (OUTPATIENT)
Dept: OUTPATIENT SERVICES | Facility: HOSPITAL | Age: 69
LOS: 1 days | End: 2024-11-26
Payer: COMMERCIAL

## 2024-11-26 ENCOUNTER — APPOINTMENT (OUTPATIENT)
Dept: INTERNAL MEDICINE | Facility: CLINIC | Age: 69
End: 2024-11-26
Payer: COMMERCIAL

## 2024-11-26 VITALS
DIASTOLIC BLOOD PRESSURE: 90 MMHG | WEIGHT: 276 LBS | BODY MASS INDEX: 31.9 KG/M2 | OXYGEN SATURATION: 98 % | SYSTOLIC BLOOD PRESSURE: 149 MMHG | HEART RATE: 74 BPM

## 2024-11-26 DIAGNOSIS — Z98.890 OTHER SPECIFIED POSTPROCEDURAL STATES: Chronic | ICD-10-CM

## 2024-11-26 DIAGNOSIS — I1A.0 RESISTANT HYPERTENSION: ICD-10-CM

## 2024-11-26 DIAGNOSIS — G89.29 LUMBAGO WITH SCIATICA, LEFT SIDE: ICD-10-CM

## 2024-11-26 DIAGNOSIS — T14.8 OTHER INJURY OF UNSPECIFIED BODY REGION: Chronic | ICD-10-CM

## 2024-11-26 DIAGNOSIS — Z00.00 ENCOUNTER FOR GENERAL ADULT MEDICAL EXAMINATION W/OUT ABNORMAL FINDINGS: ICD-10-CM

## 2024-11-26 DIAGNOSIS — K31.A0 GASTRIC INTESTINAL METAPLASIA, UNSPECIFIED: ICD-10-CM

## 2024-11-26 DIAGNOSIS — M54.42 LUMBAGO WITH SCIATICA, LEFT SIDE: ICD-10-CM

## 2024-11-26 DIAGNOSIS — I10 ESSENTIAL (PRIMARY) HYPERTENSION: ICD-10-CM

## 2024-11-26 DIAGNOSIS — I48.91 UNSPECIFIED ATRIAL FIBRILLATION: ICD-10-CM

## 2024-11-26 DIAGNOSIS — E78.5 HYPERLIPIDEMIA, UNSPECIFIED: ICD-10-CM

## 2024-11-26 DIAGNOSIS — M54.41 LUMBAGO WITH SCIATICA, LEFT SIDE: ICD-10-CM

## 2024-11-26 DIAGNOSIS — N18.30 CHRONIC KIDNEY DISEASE, STAGE 3 UNSPECIFIED: ICD-10-CM

## 2024-11-26 PROCEDURE — G0463: CPT

## 2024-11-26 PROCEDURE — 99214 OFFICE O/P EST MOD 30 MIN: CPT

## 2024-11-26 PROCEDURE — G2211 COMPLEX E/M VISIT ADD ON: CPT

## 2024-11-26 RX ORDER — LIDOCAINE 40 MG/G
4 PATCH TOPICAL
Qty: 30 | Refills: 0 | Status: ACTIVE | COMMUNITY
Start: 2024-11-26 | End: 1900-01-01

## 2024-12-03 DIAGNOSIS — I1A.0 RESISTANT HYPERTENSION: ICD-10-CM

## 2024-12-03 DIAGNOSIS — M54.42 LUMBAGO WITH SCIATICA, LEFT SIDE: ICD-10-CM

## 2024-12-03 DIAGNOSIS — E78.5 HYPERLIPIDEMIA, UNSPECIFIED: ICD-10-CM

## 2024-12-03 DIAGNOSIS — G89.29 OTHER CHRONIC PAIN: ICD-10-CM

## 2024-12-03 DIAGNOSIS — N18.30 CHRONIC KIDNEY DISEASE, STAGE 3 UNSPECIFIED: ICD-10-CM

## 2024-12-03 DIAGNOSIS — K31.A0 GASTRIC INTESTINAL METAPLASIA, UNSPECIFIED: ICD-10-CM

## 2024-12-03 DIAGNOSIS — I48.91 UNSPECIFIED ATRIAL FIBRILLATION: ICD-10-CM

## 2024-12-03 DIAGNOSIS — M54.41 LUMBAGO WITH SCIATICA, RIGHT SIDE: ICD-10-CM

## 2024-12-03 DIAGNOSIS — Z00.00 ENCOUNTER FOR GENERAL ADULT MEDICAL EXAMINATION WITHOUT ABNORMAL FINDINGS: ICD-10-CM

## 2024-12-04 ENCOUNTER — APPOINTMENT (OUTPATIENT)
Dept: INTERNAL MEDICINE | Facility: CLINIC | Age: 69
End: 2024-12-04

## 2024-12-04 ENCOUNTER — TRANSCRIPTION ENCOUNTER (OUTPATIENT)
Age: 69
End: 2024-12-04

## 2024-12-04 ENCOUNTER — NON-APPOINTMENT (OUTPATIENT)
Age: 69
End: 2024-12-04

## 2024-12-04 DIAGNOSIS — R05.3 CHRONIC COUGH: ICD-10-CM

## 2024-12-04 RX ORDER — BENZONATATE 100 MG/1
100 CAPSULE ORAL 3 TIMES DAILY
Qty: 30 | Refills: 0 | Status: ACTIVE | COMMUNITY
Start: 2024-12-04 | End: 1900-01-01

## 2024-12-27 ENCOUNTER — APPOINTMENT (OUTPATIENT)
Dept: UROLOGY | Facility: CLINIC | Age: 69
End: 2024-12-27

## 2025-01-06 ENCOUNTER — OUTPATIENT (OUTPATIENT)
Dept: OUTPATIENT SERVICES | Facility: HOSPITAL | Age: 70
LOS: 1 days | End: 2025-01-06
Payer: COMMERCIAL

## 2025-01-06 ENCOUNTER — APPOINTMENT (OUTPATIENT)
Dept: INTERNAL MEDICINE | Facility: CLINIC | Age: 70
End: 2025-01-06
Payer: COMMERCIAL

## 2025-01-06 VITALS
BODY MASS INDEX: 30.78 KG/M2 | SYSTOLIC BLOOD PRESSURE: 180 MMHG | DIASTOLIC BLOOD PRESSURE: 100 MMHG | OXYGEN SATURATION: 98 % | HEIGHT: 78 IN | WEIGHT: 266 LBS | HEART RATE: 93 BPM

## 2025-01-06 DIAGNOSIS — Z00.00 ENCOUNTER FOR GENERAL ADULT MEDICAL EXAMINATION W/OUT ABNORMAL FINDINGS: ICD-10-CM

## 2025-01-06 DIAGNOSIS — R59.1 GENERALIZED ENLARGED LYMPH NODES: ICD-10-CM

## 2025-01-06 DIAGNOSIS — Z98.890 OTHER SPECIFIED POSTPROCEDURAL STATES: Chronic | ICD-10-CM

## 2025-01-06 DIAGNOSIS — T14.8 OTHER INJURY OF UNSPECIFIED BODY REGION: Chronic | ICD-10-CM

## 2025-01-06 DIAGNOSIS — E78.5 HYPERLIPIDEMIA, UNSPECIFIED: ICD-10-CM
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
## 2025-01-06 PROCEDURE — 99214 OFFICE O/P EST MOD 30 MIN: CPT

## 2025-01-06 PROCEDURE — G2211 COMPLEX E/M VISIT ADD ON: CPT

## 2025-01-06 PROCEDURE — G0463: CPT

## 2025-01-07 ENCOUNTER — NON-APPOINTMENT (OUTPATIENT)
Age: 70
End: 2025-01-07

## 2025-01-07 DIAGNOSIS — I10 ESSENTIAL (PRIMARY) HYPERTENSION: ICD-10-CM

## 2025-01-07 LAB
CHOLEST SERPL-MCNC: 212 MG/DL
HDLC SERPL-MCNC: 91 MG/DL
LDLC SERPL CALC-MCNC: 106 MG/DL
NONHDLC SERPL-MCNC: 122 MG/DL
TRIGL SERPL-MCNC: 88 MG/DL
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
## 2025-01-13 DIAGNOSIS — R59.1 GENERALIZED ENLARGED LYMPH NODES: ICD-10-CM

## 2025-01-13 DIAGNOSIS — E78.5 HYPERLIPIDEMIA, UNSPECIFIED: ICD-10-CM

## 2025-01-22 ENCOUNTER — OUTPATIENT (OUTPATIENT)
Dept: OUTPATIENT SERVICES | Facility: HOSPITAL | Age: 70
LOS: 1 days | End: 2025-01-22
Payer: COMMERCIAL

## 2025-01-22 ENCOUNTER — APPOINTMENT (OUTPATIENT)
Dept: INTERNAL MEDICINE | Facility: CLINIC | Age: 70
End: 2025-01-22
Payer: COMMERCIAL

## 2025-01-22 VITALS
DIASTOLIC BLOOD PRESSURE: 152 MMHG | WEIGHT: 268 LBS | BODY MASS INDEX: 31.01 KG/M2 | OXYGEN SATURATION: 98 % | SYSTOLIC BLOOD PRESSURE: 220 MMHG | HEART RATE: 69 BPM | HEIGHT: 78 IN

## 2025-01-22 VITALS — DIASTOLIC BLOOD PRESSURE: 98 MMHG | SYSTOLIC BLOOD PRESSURE: 182 MMHG

## 2025-01-22 DIAGNOSIS — M54.41 LUMBAGO WITH SCIATICA, LEFT SIDE: ICD-10-CM

## 2025-01-22 DIAGNOSIS — K31.A0 GASTRIC INTESTINAL METAPLASIA, UNSPECIFIED: ICD-10-CM

## 2025-01-22 DIAGNOSIS — N18.30 CHRONIC KIDNEY DISEASE, STAGE 3 UNSPECIFIED: ICD-10-CM

## 2025-01-22 DIAGNOSIS — Z98.890 OTHER SPECIFIED POSTPROCEDURAL STATES: Chronic | ICD-10-CM

## 2025-01-22 DIAGNOSIS — M54.42 LUMBAGO WITH SCIATICA, LEFT SIDE: ICD-10-CM

## 2025-01-22 DIAGNOSIS — S69.91XA UNSPECIFIED INJURY OF RIGHT WRIST, HAND AND FINGER(S), INITIAL ENCOUNTER: ICD-10-CM

## 2025-01-22 DIAGNOSIS — E78.5 HYPERLIPIDEMIA, UNSPECIFIED: ICD-10-CM
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
## 2025-01-22 DIAGNOSIS — R59.1 GENERALIZED ENLARGED LYMPH NODES: ICD-10-CM

## 2025-01-22 DIAGNOSIS — I10 ESSENTIAL (PRIMARY) HYPERTENSION: ICD-10-CM

## 2025-01-22 DIAGNOSIS — G89.29 LUMBAGO WITH SCIATICA, LEFT SIDE: ICD-10-CM

## 2025-01-22 DIAGNOSIS — Z00.00 ENCOUNTER FOR GENERAL ADULT MEDICAL EXAMINATION W/OUT ABNORMAL FINDINGS: ICD-10-CM

## 2025-01-22 DIAGNOSIS — I48.91 UNSPECIFIED ATRIAL FIBRILLATION: ICD-10-CM

## 2025-01-22 DIAGNOSIS — T14.8 OTHER INJURY OF UNSPECIFIED BODY REGION: Chronic | ICD-10-CM

## 2025-01-22 DIAGNOSIS — I1A.0 RESISTANT HYPERTENSION: ICD-10-CM

## 2025-01-22 PROCEDURE — 99397 PER PM REEVAL EST PAT 65+ YR: CPT

## 2025-01-22 PROCEDURE — G0439: CPT

## 2025-01-28 DIAGNOSIS — G89.29 OTHER CHRONIC PAIN: ICD-10-CM

## 2025-01-28 DIAGNOSIS — I1A.0 RESISTANT HYPERTENSION: ICD-10-CM

## 2025-01-28 DIAGNOSIS — R59.1 GENERALIZED ENLARGED LYMPH NODES: ICD-10-CM

## 2025-01-28 DIAGNOSIS — E78.5 HYPERLIPIDEMIA, UNSPECIFIED: ICD-10-CM

## 2025-01-28 DIAGNOSIS — M54.42 LUMBAGO WITH SCIATICA, LEFT SIDE: ICD-10-CM

## 2025-01-28 DIAGNOSIS — M54.41 LUMBAGO WITH SCIATICA, RIGHT SIDE: ICD-10-CM

## 2025-01-28 DIAGNOSIS — N18.30 CHRONIC KIDNEY DISEASE, STAGE 3 UNSPECIFIED: ICD-10-CM

## 2025-01-28 DIAGNOSIS — S69.91XA UNSPECIFIED INJURY OF RIGHT WRIST, HAND AND FINGER(S), INITIAL ENCOUNTER: ICD-10-CM

## 2025-01-28 DIAGNOSIS — I48.91 UNSPECIFIED ATRIAL FIBRILLATION: ICD-10-CM

## 2025-01-28 DIAGNOSIS — K31.A0 GASTRIC INTESTINAL METAPLASIA, UNSPECIFIED: ICD-10-CM

## 2025-02-04 ENCOUNTER — OUTPATIENT (OUTPATIENT)
Dept: OUTPATIENT SERVICES | Facility: HOSPITAL | Age: 70
LOS: 1 days | End: 2025-02-04
Payer: COMMERCIAL

## 2025-02-04 ENCOUNTER — APPOINTMENT (OUTPATIENT)
Dept: CT IMAGING | Facility: CLINIC | Age: 70
End: 2025-02-04
Payer: COMMERCIAL

## 2025-02-04 ENCOUNTER — APPOINTMENT (OUTPATIENT)
Dept: RADIOLOGY | Facility: CLINIC | Age: 70
End: 2025-02-04
Payer: COMMERCIAL

## 2025-02-04 DIAGNOSIS — T14.8 OTHER INJURY OF UNSPECIFIED BODY REGION: Chronic | ICD-10-CM

## 2025-02-04 DIAGNOSIS — Z98.890 OTHER SPECIFIED POSTPROCEDURAL STATES: Chronic | ICD-10-CM
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
## 2025-02-04 PROCEDURE — 73130 X-RAY EXAM OF HAND: CPT

## 2025-02-04 PROCEDURE — 72125 CT NECK SPINE W/O DYE: CPT | Mod: 26

## 2025-02-04 PROCEDURE — 70450 CT HEAD/BRAIN W/O DYE: CPT | Mod: 26

## 2025-02-04 PROCEDURE — 72125 CT NECK SPINE W/O DYE: CPT

## 2025-02-04 PROCEDURE — 73130 X-RAY EXAM OF HAND: CPT | Mod: 26,RT

## 2025-02-04 PROCEDURE — 70450 CT HEAD/BRAIN W/O DYE: CPT

## 2025-02-11 ENCOUNTER — NON-APPOINTMENT (OUTPATIENT)
Age: 70
End: 2025-02-11

## 2025-02-12 ENCOUNTER — NON-APPOINTMENT (OUTPATIENT)
Age: 70
End: 2025-02-12

## 2025-02-12 ENCOUNTER — RESULT CHARGE (OUTPATIENT)
Age: 70
End: 2025-02-12

## 2025-02-12 ENCOUNTER — OUTPATIENT (OUTPATIENT)
Dept: OUTPATIENT SERVICES | Facility: HOSPITAL | Age: 70
LOS: 1 days | End: 2025-02-12

## 2025-02-12 ENCOUNTER — APPOINTMENT (OUTPATIENT)
Dept: INTERNAL MEDICINE | Facility: CLINIC | Age: 70
End: 2025-02-12

## 2025-02-12 VITALS
WEIGHT: 272 LBS | DIASTOLIC BLOOD PRESSURE: 160 MMHG | SYSTOLIC BLOOD PRESSURE: 240 MMHG | BODY MASS INDEX: 31.43 KG/M2 | HEART RATE: 59 BPM | OXYGEN SATURATION: 94 %

## 2025-02-12 DIAGNOSIS — S91.309A UNSPECIFIED OPEN WOUND, UNSPECIFIED FOOT, INITIAL ENCOUNTER: ICD-10-CM

## 2025-02-12 DIAGNOSIS — Z98.890 OTHER SPECIFIED POSTPROCEDURAL STATES: Chronic | ICD-10-CM

## 2025-02-12 DIAGNOSIS — M54.9 DORSALGIA, UNSPECIFIED: ICD-10-CM

## 2025-02-12 DIAGNOSIS — T14.8 OTHER INJURY OF UNSPECIFIED BODY REGION: Chronic | ICD-10-CM

## 2025-02-12 DIAGNOSIS — Z00.00 ENCOUNTER FOR GENERAL ADULT MEDICAL EXAMINATION W/OUT ABNORMAL FINDINGS: ICD-10-CM

## 2025-02-12 DIAGNOSIS — I10 ESSENTIAL (PRIMARY) HYPERTENSION: ICD-10-CM

## 2025-02-12 DIAGNOSIS — I1A.0 RESISTANT HYPERTENSION: ICD-10-CM

## 2025-02-12 DIAGNOSIS — J02.9 ACUTE PHARYNGITIS, UNSPECIFIED: ICD-10-CM

## 2025-02-12 PROCEDURE — 99214 OFFICE O/P EST MOD 30 MIN: CPT | Mod: GC

## 2025-02-12 PROCEDURE — G0463: CPT

## 2025-02-12 RX ORDER — DICLOFENAC SODIUM 3 G/100G
3 GEL TOPICAL TWICE DAILY
Qty: 1 | Refills: 0 | Status: ACTIVE | COMMUNITY
Start: 2025-02-12 | End: 1900-01-01

## 2025-02-12 RX ORDER — MUPIROCIN 20 MG/G
2 OINTMENT TOPICAL 3 TIMES DAILY
Qty: 1 | Refills: 0 | Status: ACTIVE | COMMUNITY
Start: 2025-02-12 | End: 1900-01-01

## 2025-02-19 ENCOUNTER — NON-APPOINTMENT (OUTPATIENT)
Age: 70
End: 2025-02-19

## 2025-02-25 ENCOUNTER — NON-APPOINTMENT (OUTPATIENT)
Age: 70
End: 2025-02-25

## 2025-02-27 ENCOUNTER — NON-APPOINTMENT (OUTPATIENT)
Age: 70
End: 2025-02-27

## 2025-02-28 ENCOUNTER — APPOINTMENT (OUTPATIENT)
Dept: OTHER | Facility: CLINIC | Age: 70
End: 2025-02-28
Payer: COMMERCIAL

## 2025-02-28 VITALS
RESPIRATION RATE: 16 BRPM | BODY MASS INDEX: 31.47 KG/M2 | HEART RATE: 91 BPM | TEMPERATURE: 98.3 F | OXYGEN SATURATION: 99 % | WEIGHT: 272 LBS | HEIGHT: 78 IN | DIASTOLIC BLOOD PRESSURE: 116 MMHG | SYSTOLIC BLOOD PRESSURE: 228 MMHG

## 2025-02-28 VITALS — SYSTOLIC BLOOD PRESSURE: 176 MMHG | HEART RATE: 84 BPM | DIASTOLIC BLOOD PRESSURE: 98 MMHG

## 2025-02-28 DIAGNOSIS — G47.33 OBSTRUCTIVE SLEEP APNEA (ADULT) (PEDIATRIC): ICD-10-CM

## 2025-02-28 DIAGNOSIS — Z04.9 ENCOUNTER FOR EXAMINATION AND OBSERVATION FOR UNSPECIFIED REASON: ICD-10-CM

## 2025-02-28 DIAGNOSIS — Z85.46 PERSONAL HISTORY OF MALIGNANT NEOPLASM OF PROSTATE: ICD-10-CM

## 2025-02-28 DIAGNOSIS — C61 MALIGNANT NEOPLASM OF PROSTATE: ICD-10-CM

## 2025-02-28 DIAGNOSIS — J32.9 CHRONIC SINUSITIS, UNSPECIFIED: ICD-10-CM

## 2025-02-28 DIAGNOSIS — K21.9 GASTRO-ESOPHAGEAL REFLUX DISEASE W/OUT ESOPHAGITIS: ICD-10-CM

## 2025-02-28 PROCEDURE — 99215 OFFICE O/P EST HI 40 MIN: CPT | Mod: 25

## 2025-02-28 PROCEDURE — 99397 PER PM REEVAL EST PAT 65+ YR: CPT | Mod: 25

## 2025-03-01 LAB
ALBUMIN SERPL ELPH-MCNC: 4.3 G/DL
ALP BLD-CCNC: 76 U/L
ALT SERPL-CCNC: 25 U/L
ANION GAP SERPL CALC-SCNC: 12 MMOL/L
AST SERPL-CCNC: 34 U/L
BASOPHILS # BLD AUTO: 0.05 K/UL
BASOPHILS NFR BLD AUTO: 1 %
BILIRUB SERPL-MCNC: 0.5 MG/DL
BUN SERPL-MCNC: 23 MG/DL
CALCIUM SERPL-MCNC: 9.6 MG/DL
CHLORIDE SERPL-SCNC: 106 MMOL/L
CHOLEST SERPL-MCNC: 206 MG/DL
CO2 SERPL-SCNC: 26 MMOL/L
CREAT SERPL-MCNC: 1.43 MG/DL
EGFR: 54 ML/MIN/1.73M2
EOSINOPHIL # BLD AUTO: 0.03 K/UL
EOSINOPHIL NFR BLD AUTO: 0.6 %
GLUCOSE SERPL-MCNC: 105 MG/DL
HCT VFR BLD CALC: 47.9 %
HDLC SERPL-MCNC: 90 MG/DL
HGB BLD-MCNC: 14.9 G/DL
IMM GRANULOCYTES NFR BLD AUTO: 0.2 %
LDLC SERPL CALC-MCNC: 96 MG/DL
LYMPHOCYTES # BLD AUTO: 0.92 K/UL
LYMPHOCYTES NFR BLD AUTO: 19.2 %
MAN DIFF?: NORMAL
MCHC RBC-ENTMCNC: 27.6 PG
MCHC RBC-ENTMCNC: 31.1 G/DL
MCV RBC AUTO: 88.7 FL
MONOCYTES # BLD AUTO: 0.65 K/UL
MONOCYTES NFR BLD AUTO: 13.5 %
NEUTROPHILS # BLD AUTO: 3.14 K/UL
NEUTROPHILS NFR BLD AUTO: 65.5 %
NONHDLC SERPL-MCNC: 116 MG/DL
PLATELET # BLD AUTO: 206 K/UL
POTASSIUM SERPL-SCNC: 5 MMOL/L
PROT SERPL-MCNC: 6.7 G/DL
RBC # BLD: 5.4 M/UL
RBC # FLD: 16.7 %
SODIUM SERPL-SCNC: 143 MMOL/L
TRIGL SERPL-MCNC: 116 MG/DL
WBC # FLD AUTO: 4.8 K/UL

## 2025-03-04 ENCOUNTER — APPOINTMENT (OUTPATIENT)
Dept: MRI IMAGING | Facility: CLINIC | Age: 70
End: 2025-03-04

## 2025-03-31 ENCOUNTER — OUTPATIENT (OUTPATIENT)
Dept: OUTPATIENT SERVICES | Facility: HOSPITAL | Age: 70
LOS: 1 days | End: 2025-03-31
Payer: COMMERCIAL

## 2025-03-31 ENCOUNTER — APPOINTMENT (OUTPATIENT)
Dept: INTERNAL MEDICINE | Facility: CLINIC | Age: 70
End: 2025-03-31
Payer: COMMERCIAL

## 2025-03-31 VITALS — DIASTOLIC BLOOD PRESSURE: 98 MMHG | SYSTOLIC BLOOD PRESSURE: 180 MMHG

## 2025-03-31 VITALS
HEIGHT: 78 IN | WEIGHT: 253 LBS | SYSTOLIC BLOOD PRESSURE: 200 MMHG | OXYGEN SATURATION: 97 % | DIASTOLIC BLOOD PRESSURE: 140 MMHG | HEART RATE: 76 BPM | BODY MASS INDEX: 29.27 KG/M2

## 2025-03-31 DIAGNOSIS — Z98.890 OTHER SPECIFIED POSTPROCEDURAL STATES: Chronic | ICD-10-CM

## 2025-03-31 DIAGNOSIS — Z87.828 PERSONAL HISTORY OF OTHER (HEALED) PHYSICAL INJURY AND TRAUMA: ICD-10-CM

## 2025-03-31 DIAGNOSIS — I10 ESSENTIAL (PRIMARY) HYPERTENSION: ICD-10-CM

## 2025-03-31 DIAGNOSIS — E66.811 OBESITY, CLASS 1: ICD-10-CM

## 2025-03-31 DIAGNOSIS — H93.13 TINNITUS, BILATERAL: ICD-10-CM

## 2025-03-31 DIAGNOSIS — F43.10 POST-TRAUMATIC STRESS DISORDER, UNSPECIFIED: ICD-10-CM

## 2025-03-31 DIAGNOSIS — R05.3 CHRONIC COUGH: ICD-10-CM

## 2025-03-31 DIAGNOSIS — S91.309A UNSPECIFIED OPEN WOUND, UNSPECIFIED FOOT, INITIAL ENCOUNTER: ICD-10-CM

## 2025-03-31 DIAGNOSIS — M54.9 DORSALGIA, UNSPECIFIED: ICD-10-CM

## 2025-03-31 DIAGNOSIS — S69.91XA UNSPECIFIED INJURY OF RIGHT WRIST, HAND AND FINGER(S), INITIAL ENCOUNTER: ICD-10-CM

## 2025-03-31 DIAGNOSIS — Z87.09 PERSONAL HISTORY OF OTHER DISEASES OF THE RESPIRATORY SYSTEM: ICD-10-CM

## 2025-03-31 DIAGNOSIS — G62.9 POLYNEUROPATHY, UNSPECIFIED: ICD-10-CM

## 2025-03-31 DIAGNOSIS — Z04.9 ENCOUNTER FOR EXAMINATION AND OBSERVATION FOR UNSPECIFIED REASON: ICD-10-CM

## 2025-03-31 DIAGNOSIS — Z87.898 PERSONAL HISTORY OF OTHER SPECIFIED CONDITIONS: ICD-10-CM

## 2025-03-31 DIAGNOSIS — T14.8 OTHER INJURY OF UNSPECIFIED BODY REGION: Chronic | ICD-10-CM

## 2025-03-31 DIAGNOSIS — I51.7 CARDIOMEGALY: ICD-10-CM

## 2025-03-31 DIAGNOSIS — I1A.0 RESISTANT HYPERTENSION: ICD-10-CM

## 2025-03-31 DIAGNOSIS — H90.3 SENSORINEURAL HEARING LOSS, BILATERAL: ICD-10-CM

## 2025-03-31 PROCEDURE — 99215 OFFICE O/P EST HI 40 MIN: CPT

## 2025-03-31 PROCEDURE — G0463: CPT

## 2025-03-31 PROCEDURE — G2211 COMPLEX E/M VISIT ADD ON: CPT | Mod: NC

## 2025-04-04 ENCOUNTER — APPOINTMENT (OUTPATIENT)
Dept: RADIOLOGY | Facility: CLINIC | Age: 70
End: 2025-04-04
Payer: COMMERCIAL

## 2025-04-04 ENCOUNTER — APPOINTMENT (OUTPATIENT)
Dept: UROLOGY | Facility: CLINIC | Age: 70
End: 2025-04-04
Payer: COMMERCIAL

## 2025-04-04 VITALS
BODY MASS INDEX: 30.08 KG/M2 | OXYGEN SATURATION: 99 % | TEMPERATURE: 98.1 F | HEART RATE: 67 BPM | SYSTOLIC BLOOD PRESSURE: 180 MMHG | HEIGHT: 78 IN | DIASTOLIC BLOOD PRESSURE: 74 MMHG | WEIGHT: 260 LBS

## 2025-04-04 DIAGNOSIS — M54.42 LUMBAGO WITH SCIATICA, LEFT SIDE: ICD-10-CM

## 2025-04-04 DIAGNOSIS — C61 MALIGNANT NEOPLASM OF PROSTATE: ICD-10-CM

## 2025-04-04 DIAGNOSIS — N52.9 MALE ERECTILE DYSFUNCTION, UNSPECIFIED: ICD-10-CM

## 2025-04-04 DIAGNOSIS — G89.29 LUMBAGO WITH SCIATICA, LEFT SIDE: ICD-10-CM

## 2025-04-04 DIAGNOSIS — M54.41 LUMBAGO WITH SCIATICA, LEFT SIDE: ICD-10-CM

## 2025-04-04 DIAGNOSIS — T30.0 BURN OF UNSPECIFIED BODY REGION, UNSPECIFIED DEGREE: ICD-10-CM

## 2025-04-04 PROCEDURE — 71046 X-RAY EXAM CHEST 2 VIEWS: CPT

## 2025-04-04 PROCEDURE — G2211 COMPLEX E/M VISIT ADD ON: CPT

## 2025-04-04 PROCEDURE — 99214 OFFICE O/P EST MOD 30 MIN: CPT

## 2025-04-04 RX ORDER — SILVER SULFADIAZINE 10 MG/G
1 CREAM TOPICAL TWICE DAILY
Qty: 1 | Refills: 0 | Status: ACTIVE | COMMUNITY
Start: 2025-04-04 | End: 1900-01-01

## 2025-04-07 LAB — PSA SERPL-MCNC: 0.49 NG/ML

## 2025-04-08 ENCOUNTER — APPOINTMENT (OUTPATIENT)
Dept: ORTHOPEDIC SURGERY | Facility: CLINIC | Age: 70
End: 2025-04-08

## 2025-04-10 DIAGNOSIS — S69.91XA UNSPECIFIED INJURY OF RIGHT WRIST, HAND AND FINGER(S), INITIAL ENCOUNTER: ICD-10-CM

## 2025-04-10 DIAGNOSIS — S91.309A UNSPECIFIED OPEN WOUND, UNSPECIFIED FOOT, INITIAL ENCOUNTER: ICD-10-CM

## 2025-04-17 ENCOUNTER — APPOINTMENT (OUTPATIENT)
Dept: ORTHOPEDIC SURGERY | Facility: CLINIC | Age: 70
End: 2025-04-17
Payer: OTHER MISCELLANEOUS

## 2025-04-17 VITALS
HEART RATE: 80 BPM | SYSTOLIC BLOOD PRESSURE: 214 MMHG | WEIGHT: 260 LBS | BODY MASS INDEX: 30.08 KG/M2 | HEIGHT: 78 IN | DIASTOLIC BLOOD PRESSURE: 145 MMHG

## 2025-04-17 DIAGNOSIS — S69.91XA UNSPECIFIED INJURY OF RIGHT WRIST, HAND AND FINGER(S), INITIAL ENCOUNTER: ICD-10-CM

## 2025-04-17 PROCEDURE — 73130 X-RAY EXAM OF HAND: CPT | Mod: RT

## 2025-04-17 PROCEDURE — 99203 OFFICE O/P NEW LOW 30 MIN: CPT

## 2025-04-23 ENCOUNTER — APPOINTMENT (OUTPATIENT)
Dept: ORTHOPEDIC SURGERY | Facility: CLINIC | Age: 70
End: 2025-04-23

## 2025-04-30 ENCOUNTER — APPOINTMENT (OUTPATIENT)
Dept: PLASTIC SURGERY | Facility: CLINIC | Age: 70
End: 2025-04-30
Payer: OTHER MISCELLANEOUS

## 2025-04-30 ENCOUNTER — APPOINTMENT (OUTPATIENT)
Dept: ORTHOPEDIC SURGERY | Facility: CLINIC | Age: 70
End: 2025-04-30

## 2025-04-30 DIAGNOSIS — M19.041 PRIMARY OSTEOARTHRITIS, RIGHT HAND: ICD-10-CM

## 2025-04-30 PROCEDURE — 99204 OFFICE O/P NEW MOD 45 MIN: CPT

## 2025-04-30 PROCEDURE — 73130 X-RAY EXAM OF HAND: CPT

## 2025-04-30 RX ORDER — MELOXICAM 15 MG/1
15 TABLET ORAL
Qty: 60 | Refills: 0 | Status: ACTIVE | COMMUNITY
Start: 2025-04-30 | End: 1900-01-01

## 2025-05-09 ENCOUNTER — NON-APPOINTMENT (OUTPATIENT)
Age: 70
End: 2025-05-09

## 2025-05-21 ENCOUNTER — APPOINTMENT (OUTPATIENT)
Dept: PLASTIC SURGERY | Facility: CLINIC | Age: 70
End: 2025-05-21

## 2025-05-21 DIAGNOSIS — M19.041 PRIMARY OSTEOARTHRITIS, RIGHT HAND: ICD-10-CM

## 2025-07-16 ENCOUNTER — APPOINTMENT (OUTPATIENT)
Dept: UROLOGY | Facility: CLINIC | Age: 70
End: 2025-07-16
Payer: COMMERCIAL

## 2025-07-16 VITALS
BODY MASS INDEX: 30.08 KG/M2 | HEART RATE: 55 BPM | OXYGEN SATURATION: 98 % | TEMPERATURE: 97.8 F | WEIGHT: 260 LBS | HEIGHT: 78 IN

## 2025-07-16 PROBLEM — N50.82 SCROTAL PAIN: Status: ACTIVE | Noted: 2025-07-16

## 2025-07-16 PROCEDURE — 99214 OFFICE O/P EST MOD 30 MIN: CPT

## 2025-07-16 PROCEDURE — G2211 COMPLEX E/M VISIT ADD ON: CPT

## 2025-07-16 RX ORDER — DOXYCYCLINE 100 MG/1
100 CAPSULE ORAL TWICE DAILY
Qty: 14 | Refills: 0 | Status: ACTIVE | COMMUNITY
Start: 2025-07-16 | End: 1900-01-01

## 2025-07-17 ENCOUNTER — APPOINTMENT (OUTPATIENT)
Dept: INTERNAL MEDICINE | Facility: CLINIC | Age: 70
End: 2025-07-17
Payer: COMMERCIAL

## 2025-07-17 ENCOUNTER — OUTPATIENT (OUTPATIENT)
Dept: OUTPATIENT SERVICES | Facility: HOSPITAL | Age: 70
LOS: 1 days | End: 2025-07-17
Payer: COMMERCIAL

## 2025-07-17 VITALS — HEART RATE: 75 BPM | BODY MASS INDEX: 27.88 KG/M2 | HEIGHT: 78 IN | WEIGHT: 241 LBS | OXYGEN SATURATION: 98 %

## 2025-07-17 VITALS — DIASTOLIC BLOOD PRESSURE: 90 MMHG | SYSTOLIC BLOOD PRESSURE: 170 MMHG

## 2025-07-17 DIAGNOSIS — Z98.890 OTHER SPECIFIED POSTPROCEDURAL STATES: Chronic | ICD-10-CM

## 2025-07-17 DIAGNOSIS — I10 ESSENTIAL (PRIMARY) HYPERTENSION: ICD-10-CM

## 2025-07-17 PROBLEM — G62.89 OTHER POLYNEUROPATHY: Status: ACTIVE | Noted: 2025-07-17

## 2025-07-17 PROCEDURE — G0463: CPT

## 2025-07-17 PROCEDURE — 99214 OFFICE O/P EST MOD 30 MIN: CPT

## 2025-07-17 PROCEDURE — G2211 COMPLEX E/M VISIT ADD ON: CPT | Mod: NC

## 2025-07-21 DIAGNOSIS — K31.A0 GASTRIC INTESTINAL METAPLASIA, UNSPECIFIED: ICD-10-CM

## 2025-07-21 DIAGNOSIS — Z00.00 ENCOUNTER FOR GENERAL ADULT MEDICAL EXAMINATION WITHOUT ABNORMAL FINDINGS: ICD-10-CM

## 2025-07-21 DIAGNOSIS — M19.041 PRIMARY OSTEOARTHRITIS, RIGHT HAND: ICD-10-CM

## 2025-07-21 DIAGNOSIS — G62.89 OTHER SPECIFIED POLYNEUROPATHIES: ICD-10-CM

## 2025-07-21 DIAGNOSIS — I1A.0 RESISTANT HYPERTENSION: ICD-10-CM

## 2025-08-11 ENCOUNTER — NON-APPOINTMENT (OUTPATIENT)
Age: 70
End: 2025-08-11

## 2025-08-21 ENCOUNTER — APPOINTMENT (OUTPATIENT)
Dept: INTERNAL MEDICINE | Facility: CLINIC | Age: 70
End: 2025-08-21